# Patient Record
Sex: FEMALE | Race: WHITE | NOT HISPANIC OR LATINO | URBAN - METROPOLITAN AREA
[De-identification: names, ages, dates, MRNs, and addresses within clinical notes are randomized per-mention and may not be internally consistent; named-entity substitution may affect disease eponyms.]

---

## 2017-01-09 ENCOUNTER — EMERGENCY (EMERGENCY)
Facility: HOSPITAL | Age: 67
LOS: 1 days | Discharge: HOME | End: 2017-01-09
Attending: EMERGENCY MEDICINE | Admitting: EMERGENCY MEDICINE
Payer: MEDICARE

## 2017-01-09 VITALS
SYSTOLIC BLOOD PRESSURE: 171 MMHG | OXYGEN SATURATION: 96 % | DIASTOLIC BLOOD PRESSURE: 85 MMHG | HEART RATE: 94 BPM | RESPIRATION RATE: 18 BRPM

## 2017-01-09 VITALS
RESPIRATION RATE: 18 BRPM | DIASTOLIC BLOOD PRESSURE: 88 MMHG | HEART RATE: 96 BPM | OXYGEN SATURATION: 96 % | TEMPERATURE: 99 F | SYSTOLIC BLOOD PRESSURE: 166 MMHG

## 2017-01-09 DIAGNOSIS — R10.30 LOWER ABDOMINAL PAIN, UNSPECIFIED: ICD-10-CM

## 2017-01-09 DIAGNOSIS — R35.0 FREQUENCY OF MICTURITION: ICD-10-CM

## 2017-01-09 DIAGNOSIS — M79.89 OTHER SPECIFIED SOFT TISSUE DISORDERS: ICD-10-CM

## 2017-01-09 DIAGNOSIS — R06.2 WHEEZING: ICD-10-CM

## 2017-01-09 DIAGNOSIS — F17.200 NICOTINE DEPENDENCE, UNSPECIFIED, UNCOMPLICATED: ICD-10-CM

## 2017-01-09 DIAGNOSIS — R05 COUGH: ICD-10-CM

## 2017-01-09 LAB
ALBUMIN SERPL ELPH-MCNC: 4 G/DL — SIGNIFICANT CHANGE UP (ref 3.4–5)
ALP SERPL-CCNC: 144 U/L — HIGH (ref 40–120)
ALT FLD-CCNC: 41 U/L — SIGNIFICANT CHANGE UP (ref 12–42)
ANION GAP SERPL CALC-SCNC: 13 MMOL/L — SIGNIFICANT CHANGE UP (ref 9–16)
APPEARANCE UR: CLEAR — SIGNIFICANT CHANGE UP
AST SERPL-CCNC: 26 U/L — SIGNIFICANT CHANGE UP (ref 15–37)
BASOPHILS NFR BLD AUTO: 0.3 % — SIGNIFICANT CHANGE UP (ref 0–2)
BILIRUB SERPL-MCNC: 0.3 MG/DL — SIGNIFICANT CHANGE UP (ref 0.2–1.2)
BILIRUB UR-MCNC: NEGATIVE — SIGNIFICANT CHANGE UP
BUN SERPL-MCNC: 17 MG/DL — SIGNIFICANT CHANGE UP (ref 7–23)
CALCIUM SERPL-MCNC: 9.8 MG/DL — SIGNIFICANT CHANGE UP (ref 8.5–10.5)
CHLORIDE SERPL-SCNC: 101 MMOL/L — SIGNIFICANT CHANGE UP (ref 96–108)
CK MB BLD-MCNC: 1.13 % — SIGNIFICANT CHANGE UP
CK MB CFR SERPL CALC: 1.6 NG/ML — SIGNIFICANT CHANGE UP (ref 0.5–3.6)
CK SERPL-CCNC: 142 U/L — SIGNIFICANT CHANGE UP (ref 26–192)
CO2 SERPL-SCNC: 24 MMOL/L — SIGNIFICANT CHANGE UP (ref 22–31)
COLOR SPEC: YELLOW — SIGNIFICANT CHANGE UP
CREAT SERPL-MCNC: 0.83 MG/DL — SIGNIFICANT CHANGE UP (ref 0.5–1.3)
DIFF PNL FLD: NEGATIVE — SIGNIFICANT CHANGE UP
EOSINOPHIL NFR BLD AUTO: 1.1 % — SIGNIFICANT CHANGE UP (ref 0–6)
FLUAV SPEC QL CULT: NEGATIVE — SIGNIFICANT CHANGE UP
FLUBV AG SPEC QL IA: NEGATIVE — SIGNIFICANT CHANGE UP
GLUCOSE SERPL-MCNC: 104 MG/DL — HIGH (ref 70–99)
GLUCOSE UR QL: NEGATIVE — SIGNIFICANT CHANGE UP
HCT VFR BLD CALC: 37.6 % — SIGNIFICANT CHANGE UP (ref 34.5–45)
HGB BLD-MCNC: 12.6 G/DL — SIGNIFICANT CHANGE UP (ref 11.5–15.5)
IMM GRANULOCYTES NFR BLD AUTO: 0.4 % — SIGNIFICANT CHANGE UP (ref 0–1.5)
KETONES UR-MCNC: NEGATIVE — SIGNIFICANT CHANGE UP
LACTATE SERPL-SCNC: 1.5 MMOL/L — SIGNIFICANT CHANGE UP (ref 0.4–2)
LACTATE SERPL-SCNC: 2.1 MMOL/L — HIGH (ref 0.4–2)
LEUKOCYTE ESTERASE UR-ACNC: NEGATIVE — SIGNIFICANT CHANGE UP
LIDOCAIN IGE QN: 105 U/L — SIGNIFICANT CHANGE UP (ref 73–393)
LYMPHOCYTES # BLD AUTO: 18.6 % — SIGNIFICANT CHANGE UP (ref 13–44)
MCHC RBC-ENTMCNC: 28.4 PG — SIGNIFICANT CHANGE UP (ref 27–34)
MCHC RBC-ENTMCNC: 33.5 G/DL — SIGNIFICANT CHANGE UP (ref 32–36)
MCV RBC AUTO: 84.7 FL — SIGNIFICANT CHANGE UP (ref 80–100)
MONOCYTES NFR BLD AUTO: 7 % — SIGNIFICANT CHANGE UP (ref 2–14)
NEUTROPHILS NFR BLD AUTO: 72.6 % — SIGNIFICANT CHANGE UP (ref 43–77)
NITRITE UR-MCNC: NEGATIVE — SIGNIFICANT CHANGE UP
NT-PROBNP SERPL-SCNC: 742 PG/ML — HIGH
PH UR: 6.5 — SIGNIFICANT CHANGE UP (ref 4–8.1)
PLATELET # BLD AUTO: 329 K/UL — SIGNIFICANT CHANGE UP (ref 150–400)
POTASSIUM SERPL-MCNC: 4 MMOL/L — SIGNIFICANT CHANGE UP (ref 3.5–5.3)
POTASSIUM SERPL-SCNC: 4 MMOL/L — SIGNIFICANT CHANGE UP (ref 3.5–5.3)
PROT SERPL-MCNC: 7.8 G/DL — SIGNIFICANT CHANGE UP (ref 6.4–8.2)
PROT UR-MCNC: NEGATIVE MG/DL — SIGNIFICANT CHANGE UP
RBC # BLD: 4.44 M/UL — SIGNIFICANT CHANGE UP (ref 3.8–5.2)
RBC # FLD: 15 % — SIGNIFICANT CHANGE UP (ref 10.3–16.9)
SODIUM SERPL-SCNC: 138 MMOL/L — SIGNIFICANT CHANGE UP (ref 132–145)
SP GR SPEC: 1.01 — SIGNIFICANT CHANGE UP (ref 1–1.03)
TROPONIN I SERPL-MCNC: <0.017 NG/ML — LOW (ref 0.02–0.06)
UROBILINOGEN FLD QL: 0.2 E.U./DL — SIGNIFICANT CHANGE UP
WBC # BLD: 12.2 K/UL — HIGH (ref 3.8–10.5)
WBC # FLD AUTO: 12.2 K/UL — HIGH (ref 3.8–10.5)

## 2017-01-09 PROCEDURE — 71010: CPT | Mod: 26

## 2017-01-09 PROCEDURE — 99285 EMERGENCY DEPT VISIT HI MDM: CPT | Mod: 25

## 2017-01-09 PROCEDURE — 93010 ELECTROCARDIOGRAM REPORT: CPT

## 2017-01-09 PROCEDURE — 74177 CT ABD & PELVIS W/CONTRAST: CPT | Mod: 26

## 2017-01-09 RX ORDER — IPRATROPIUM/ALBUTEROL SULFATE 18-103MCG
3 AEROSOL WITH ADAPTER (GRAM) INHALATION ONCE
Qty: 0 | Refills: 0 | Status: COMPLETED | OUTPATIENT
Start: 2017-01-09 | End: 2017-01-09

## 2017-01-09 RX ORDER — ALBUTEROL 90 UG/1
1 AEROSOL, METERED ORAL ONCE
Qty: 0 | Refills: 0 | Status: COMPLETED | OUTPATIENT
Start: 2017-01-09 | End: 2017-01-09

## 2017-01-09 RX ORDER — AZITHROMYCIN 500 MG/1
1 TABLET, FILM COATED ORAL
Qty: 3 | Refills: 0
Start: 2017-01-09 | End: 2017-01-12

## 2017-01-09 RX ORDER — SODIUM CHLORIDE 9 MG/ML
1000 INJECTION INTRAMUSCULAR; INTRAVENOUS; SUBCUTANEOUS ONCE
Qty: 0 | Refills: 0 | Status: COMPLETED | OUTPATIENT
Start: 2017-01-09 | End: 2017-01-09

## 2017-01-09 RX ORDER — MORPHINE SULFATE 50 MG/1
4 CAPSULE, EXTENDED RELEASE ORAL ONCE
Qty: 0 | Refills: 0 | Status: DISCONTINUED | OUTPATIENT
Start: 2017-01-09 | End: 2017-01-09

## 2017-01-09 RX ORDER — AZITHROMYCIN 500 MG/1
500 TABLET, FILM COATED ORAL ONCE
Qty: 0 | Refills: 0 | Status: COMPLETED | OUTPATIENT
Start: 2017-01-09 | End: 2017-01-09

## 2017-01-09 RX ORDER — IOHEXOL 300 MG/ML
50 INJECTION, SOLUTION INTRAVENOUS ONCE
Qty: 0 | Refills: 0 | Status: COMPLETED | OUTPATIENT
Start: 2017-01-09 | End: 2017-01-09

## 2017-01-09 RX ADMIN — MORPHINE SULFATE 4 MILLIGRAM(S): 50 CAPSULE, EXTENDED RELEASE ORAL at 20:10

## 2017-01-09 RX ADMIN — Medication 125 MILLIGRAM(S): at 19:11

## 2017-01-09 RX ADMIN — Medication 3 MILLILITER(S): at 19:11

## 2017-01-09 RX ADMIN — IOHEXOL 50 MILLILITER(S): 300 INJECTION, SOLUTION INTRAVENOUS at 19:11

## 2017-01-09 RX ADMIN — MORPHINE SULFATE 4 MILLIGRAM(S): 50 CAPSULE, EXTENDED RELEASE ORAL at 19:11

## 2017-01-09 RX ADMIN — SODIUM CHLORIDE 2000 MILLILITER(S): 9 INJECTION INTRAMUSCULAR; INTRAVENOUS; SUBCUTANEOUS at 20:15

## 2017-01-09 RX ADMIN — AZITHROMYCIN 500 MILLIGRAM(S): 500 TABLET, FILM COATED ORAL at 21:42

## 2017-01-09 RX ADMIN — SODIUM CHLORIDE 2000 MILLILITER(S): 9 INJECTION INTRAMUSCULAR; INTRAVENOUS; SUBCUTANEOUS at 19:09

## 2017-01-09 NOTE — ED PROVIDER NOTE - PROGRESS NOTE DETAILS
improved lactic after fluids, azithro to cover for bronchitis possibility, lung nodule on CT - follow up w pmd. inhaler as needed

## 2017-01-09 NOTE — ED PROVIDER NOTE - INTERPRETATION
normal sinus rhythm, Normal axis, Normal ID interval and QRS complex. There are no acute ischemic ST or T-wave changes.

## 2017-01-09 NOTE — ED PROVIDER NOTE - OBJECTIVE STATEMENT
67 y/o F with pmhx fibromyalgia, Hepatitis C, lives in 6-story walkup, c/o R groin pain that has worsened for the last month, worse with movement of RLE. Also has h/o piriformis muscle strain on the R side. Has had groin pain with associated dysuria, increased urinary frequency and foul smelling urine, worsened this week without any f/c. No n/v. Also has had cough with white phlegm for the past week. States in general she has deteriorated in the last year and has gained 50 pounds. She has  that coordinates that she has help at home once/week. Pt states her pain has worsened to the point that she has trouble bearing weight and has trouble doing daily activities. Denies any trauma or fall. Pt is long term smoker.

## 2017-01-09 NOTE — ED ADULT NURSE NOTE - OBJECTIVE STATEMENT
c/o right groin pain and stress incontinence x 1 month, no s/s of distress, will continue to monitor

## 2017-01-09 NOTE — ED PROVIDER NOTE - NS ED MD SCRIBE ATTENDING SCRIBE SECTIONS
VITAL SIGNS( Pullset)/HISTORY OF PRESENT ILLNESS/PAST MEDICAL/SURGICAL/SOCIAL HISTORY/HIV/REVIEW OF SYSTEMS/PHYSICAL EXAM

## 2017-01-10 RX ADMIN — ALBUTEROL 1 PUFF(S): 90 AEROSOL, METERED ORAL at 01:02

## 2017-01-11 ENCOUNTER — EMERGENCY (EMERGENCY)
Facility: HOSPITAL | Age: 67
LOS: 1 days | Discharge: HOME | End: 2017-01-11
Attending: EMERGENCY MEDICINE | Admitting: EMERGENCY MEDICINE
Payer: MEDICARE

## 2017-01-11 VITALS
TEMPERATURE: 100 F | RESPIRATION RATE: 20 BRPM | HEART RATE: 96 BPM | SYSTOLIC BLOOD PRESSURE: 156 MMHG | OXYGEN SATURATION: 95 % | DIASTOLIC BLOOD PRESSURE: 71 MMHG

## 2017-01-11 VITALS
RESPIRATION RATE: 20 BRPM | DIASTOLIC BLOOD PRESSURE: 92 MMHG | OXYGEN SATURATION: 96 % | TEMPERATURE: 98 F | SYSTOLIC BLOOD PRESSURE: 154 MMHG | HEART RATE: 107 BPM

## 2017-01-11 DIAGNOSIS — Z79.899 OTHER LONG TERM (CURRENT) DRUG THERAPY: ICD-10-CM

## 2017-01-11 DIAGNOSIS — Z79.891 LONG TERM (CURRENT) USE OF OPIATE ANALGESIC: ICD-10-CM

## 2017-01-11 DIAGNOSIS — M79.7 FIBROMYALGIA: ICD-10-CM

## 2017-01-11 DIAGNOSIS — Z79.2 LONG TERM (CURRENT) USE OF ANTIBIOTICS: ICD-10-CM

## 2017-01-11 DIAGNOSIS — R10.30 LOWER ABDOMINAL PAIN, UNSPECIFIED: ICD-10-CM

## 2017-01-11 LAB
-  AMIKACIN: SIGNIFICANT CHANGE UP
-  AMPICILLIN/SULBACTAM: SIGNIFICANT CHANGE UP
-  AMPICILLIN: SIGNIFICANT CHANGE UP
-  AZTREONAM: SIGNIFICANT CHANGE UP
-  CEFAZOLIN: SIGNIFICANT CHANGE UP
-  CEFEPIME: SIGNIFICANT CHANGE UP
-  CEFOXITIN: SIGNIFICANT CHANGE UP
-  CEFTAZIDIME: SIGNIFICANT CHANGE UP
-  CEFTRIAXONE: SIGNIFICANT CHANGE UP
-  CIPROFLOXACIN: SIGNIFICANT CHANGE UP
-  ERTAPENEM: SIGNIFICANT CHANGE UP
-  GENTAMICIN: SIGNIFICANT CHANGE UP
-  LEVOFLOXACIN: SIGNIFICANT CHANGE UP
-  MEROPENEM: SIGNIFICANT CHANGE UP
-  NITROFURANTOIN: SIGNIFICANT CHANGE UP
-  PIPERACILLIN/TAZOBACTAM: SIGNIFICANT CHANGE UP
-  TOBRAMYCIN: SIGNIFICANT CHANGE UP
-  TRIMETHOPRIM/SULFAMETHOXAZOLE: SIGNIFICANT CHANGE UP
CULTURE RESULTS: SIGNIFICANT CHANGE UP
METHOD TYPE: SIGNIFICANT CHANGE UP
ORGANISM # SPEC MICROSCOPIC CNT: SIGNIFICANT CHANGE UP
ORGANISM # SPEC MICROSCOPIC CNT: SIGNIFICANT CHANGE UP
SPECIMEN SOURCE: SIGNIFICANT CHANGE UP

## 2017-01-11 PROCEDURE — 99284 EMERGENCY DEPT VISIT MOD MDM: CPT

## 2017-01-11 RX ORDER — DIAZEPAM 5 MG
1 TABLET ORAL
Qty: 12 | Refills: 0
Start: 2017-01-11

## 2017-01-11 RX ORDER — KETOROLAC TROMETHAMINE 30 MG/ML
60 SYRINGE (ML) INJECTION ONCE
Qty: 0 | Refills: 0 | Status: DISCONTINUED | OUTPATIENT
Start: 2017-01-11 | End: 2017-01-11

## 2017-01-11 RX ORDER — MOXIFLOXACIN HYDROCHLORIDE TABLETS, 400 MG 400 MG/1
1 TABLET, FILM COATED ORAL
Qty: 6 | Refills: 0
Start: 2017-01-11 | End: 2017-01-14

## 2017-01-11 RX ADMIN — Medication 60 MILLIGRAM(S): at 17:54

## 2017-01-11 NOTE — ED PROVIDER NOTE - NS ED MD SCRIBE ATTENDING SCRIBE SECTIONS
HIV/RESULTS/PROGRESS NOTE/REVIEW OF SYSTEMS/PAST MEDICAL/SURGICAL/SOCIAL HISTORY/DISPOSITION/PHYSICAL EXAM/HISTORY OF PRESENT ILLNESS/VITAL SIGNS( Pullset)

## 2017-01-11 NOTE — ED PROVIDER NOTE - OBJECTIVE STATEMENT
67 y/o F with pmhx fibromyalgia, Hepatitis C, lives in 6-story walkup, c/o R groin pain that has worsened for the last month, worse with movement of RLE. Also has h/o piriformis muscle strain on the R side. Was seen here yesterday for groin pain and cough, given Azithromycin for possible bronchitis per persistent right groin pain, states 10/10. Today patient presents complaining of persistent right groin pain.  Worse with movement, consistent with history of fibromyalgia and strained groin muscle. Pt did not take any pain medications prior to arrival. NO fever, no chills, and no trauma. Wants something to help with muscle relaxants and pain.

## 2017-01-11 NOTE — ED PROVIDER NOTE - PROGRESS NOTE DETAILS
pt feels improved, will rx valium, cipro for UTI, give strict return precautions, follow up with PMD

## 2017-01-11 NOTE — ED PROVIDER NOTE - PSYCHIATRIC, MLM
Alert and oriented to person, place, time/situation. normal mood and affect. no apparent risk to self or others. anxious appearing

## 2017-01-14 LAB
CULTURE RESULTS: SIGNIFICANT CHANGE UP
SPECIMEN SOURCE: SIGNIFICANT CHANGE UP

## 2017-02-01 ENCOUNTER — EMERGENCY (EMERGENCY)
Facility: HOSPITAL | Age: 67
LOS: 1 days | Discharge: HOME | End: 2017-02-01
Attending: EMERGENCY MEDICINE | Admitting: EMERGENCY MEDICINE
Payer: MEDICARE

## 2017-02-01 VITALS
DIASTOLIC BLOOD PRESSURE: 74 MMHG | RESPIRATION RATE: 16 BRPM | TEMPERATURE: 99 F | SYSTOLIC BLOOD PRESSURE: 134 MMHG | HEART RATE: 92 BPM | OXYGEN SATURATION: 95 %

## 2017-02-01 VITALS
HEART RATE: 102 BPM | TEMPERATURE: 98 F | DIASTOLIC BLOOD PRESSURE: 71 MMHG | SYSTOLIC BLOOD PRESSURE: 114 MMHG | OXYGEN SATURATION: 95 % | RESPIRATION RATE: 17 BRPM

## 2017-02-01 DIAGNOSIS — R10.30 LOWER ABDOMINAL PAIN, UNSPECIFIED: ICD-10-CM

## 2017-02-01 DIAGNOSIS — D64.9 ANEMIA, UNSPECIFIED: ICD-10-CM

## 2017-02-01 DIAGNOSIS — Z79.899 OTHER LONG TERM (CURRENT) DRUG THERAPY: ICD-10-CM

## 2017-02-01 DIAGNOSIS — Z79.52 LONG TERM (CURRENT) USE OF SYSTEMIC STEROIDS: ICD-10-CM

## 2017-02-01 DIAGNOSIS — Z79.891 LONG TERM (CURRENT) USE OF OPIATE ANALGESIC: ICD-10-CM

## 2017-02-01 DIAGNOSIS — Z79.2 LONG TERM (CURRENT) USE OF ANTIBIOTICS: ICD-10-CM

## 2017-02-01 PROBLEM — M50.20 OTHER CERVICAL DISC DISPLACEMENT, UNSPECIFIED CERVICAL REGION: Chronic | Status: ACTIVE | Noted: 2017-01-09

## 2017-02-01 PROBLEM — B19.20 UNSPECIFIED VIRAL HEPATITIS C WITHOUT HEPATIC COMA: Chronic | Status: ACTIVE | Noted: 2017-01-09

## 2017-02-01 PROBLEM — M79.7 FIBROMYALGIA: Chronic | Status: ACTIVE | Noted: 2017-01-09

## 2017-02-01 LAB
ALBUMIN SERPL ELPH-MCNC: 3.5 G/DL — SIGNIFICANT CHANGE UP (ref 3.4–5)
ALP SERPL-CCNC: 166 U/L — HIGH (ref 40–120)
ALT FLD-CCNC: 31 U/L — SIGNIFICANT CHANGE UP (ref 12–42)
ANION GAP SERPL CALC-SCNC: 8 MMOL/L — LOW (ref 9–16)
AST SERPL-CCNC: 27 U/L — SIGNIFICANT CHANGE UP (ref 15–37)
BASOPHILS NFR BLD AUTO: 0.5 % — SIGNIFICANT CHANGE UP (ref 0–2)
BASOPHILS NFR BLD AUTO: 0.6 % — SIGNIFICANT CHANGE UP (ref 0–2)
BILIRUB SERPL-MCNC: 0.3 MG/DL — SIGNIFICANT CHANGE UP (ref 0.2–1.2)
BUN SERPL-MCNC: 17 MG/DL — SIGNIFICANT CHANGE UP (ref 7–23)
CALCIUM SERPL-MCNC: 8.8 MG/DL — SIGNIFICANT CHANGE UP (ref 8.5–10.5)
CHLORIDE SERPL-SCNC: 101 MMOL/L — SIGNIFICANT CHANGE UP (ref 96–108)
CO2 SERPL-SCNC: 30 MMOL/L — SIGNIFICANT CHANGE UP (ref 22–31)
CREAT SERPL-MCNC: 1.08 MG/DL — SIGNIFICANT CHANGE UP (ref 0.5–1.3)
EOSINOPHIL NFR BLD AUTO: 2.3 % — SIGNIFICANT CHANGE UP (ref 0–6)
EOSINOPHIL NFR BLD AUTO: 3.1 % — SIGNIFICANT CHANGE UP (ref 0–6)
GLUCOSE SERPL-MCNC: 115 MG/DL — HIGH (ref 70–99)
HCT VFR BLD CALC: 22.1 % — LOW (ref 34.5–45)
HCT VFR BLD CALC: 23.4 % — LOW (ref 34.5–45)
HGB BLD-MCNC: 6.9 G/DL — CRITICAL LOW (ref 11.5–15.5)
HGB BLD-MCNC: 7.3 G/DL — LOW (ref 11.5–15.5)
IMM GRANULOCYTES NFR BLD AUTO: 0.3 % — SIGNIFICANT CHANGE UP (ref 0–1.5)
IMM GRANULOCYTES NFR BLD AUTO: 0.5 % — SIGNIFICANT CHANGE UP (ref 0–1.5)
LYMPHOCYTES # BLD AUTO: 20.7 % — SIGNIFICANT CHANGE UP (ref 13–44)
LYMPHOCYTES # BLD AUTO: 28.9 % — SIGNIFICANT CHANGE UP (ref 13–44)
MCHC RBC-ENTMCNC: 26.1 PG — LOW (ref 27–34)
MCHC RBC-ENTMCNC: 26.2 PG — LOW (ref 27–34)
MCHC RBC-ENTMCNC: 31.2 G/DL — LOW (ref 32–36)
MCHC RBC-ENTMCNC: 31.2 G/DL — LOW (ref 32–36)
MCV RBC AUTO: 83.7 FL — SIGNIFICANT CHANGE UP (ref 80–100)
MCV RBC AUTO: 83.9 FL — SIGNIFICANT CHANGE UP (ref 80–100)
MONOCYTES NFR BLD AUTO: 10.1 % — SIGNIFICANT CHANGE UP (ref 2–14)
MONOCYTES NFR BLD AUTO: 11 % — SIGNIFICANT CHANGE UP (ref 2–14)
NEUTROPHILS NFR BLD AUTO: 56.1 % — SIGNIFICANT CHANGE UP (ref 43–77)
NEUTROPHILS NFR BLD AUTO: 65.9 % — SIGNIFICANT CHANGE UP (ref 43–77)
OB PNL STL: NEGATIVE — SIGNIFICANT CHANGE UP
PLATELET # BLD AUTO: 337 K/UL — SIGNIFICANT CHANGE UP (ref 150–400)
PLATELET # BLD AUTO: 365 K/UL — SIGNIFICANT CHANGE UP (ref 150–400)
POTASSIUM SERPL-MCNC: 3.7 MMOL/L — SIGNIFICANT CHANGE UP (ref 3.5–5.3)
POTASSIUM SERPL-SCNC: 3.7 MMOL/L — SIGNIFICANT CHANGE UP (ref 3.5–5.3)
PROT SERPL-MCNC: 7 G/DL — SIGNIFICANT CHANGE UP (ref 6.4–8.2)
RBC # BLD: 2.64 M/UL — LOW (ref 3.8–5.2)
RBC # BLD: 2.79 M/UL — LOW (ref 3.8–5.2)
RBC # FLD: 16.9 % — SIGNIFICANT CHANGE UP (ref 10.3–16.9)
RBC # FLD: 17.2 % — HIGH (ref 10.3–16.9)
SODIUM SERPL-SCNC: 139 MMOL/L — SIGNIFICANT CHANGE UP (ref 132–145)
WBC # BLD: 7.1 K/UL — SIGNIFICANT CHANGE UP (ref 3.8–10.5)
WBC # BLD: 7.4 K/UL — SIGNIFICANT CHANGE UP (ref 3.8–10.5)
WBC # FLD AUTO: 7.1 K/UL — SIGNIFICANT CHANGE UP (ref 3.8–10.5)
WBC # FLD AUTO: 7.4 K/UL — SIGNIFICANT CHANGE UP (ref 3.8–10.5)

## 2017-02-01 PROCEDURE — 73502 X-RAY EXAM HIP UNI 2-3 VIEWS: CPT | Mod: 26

## 2017-02-01 PROCEDURE — 99285 EMERGENCY DEPT VISIT HI MDM: CPT

## 2017-02-01 PROCEDURE — 93970 EXTREMITY STUDY: CPT | Mod: 26

## 2017-02-01 RX ORDER — MORPHINE SULFATE 50 MG/1
4 CAPSULE, EXTENDED RELEASE ORAL ONCE
Qty: 0 | Refills: 0 | Status: DISCONTINUED | OUTPATIENT
Start: 2017-02-01 | End: 2017-02-01

## 2017-02-01 RX ORDER — DIAZEPAM 5 MG
1 TABLET ORAL
Qty: 9 | Refills: 0
Start: 2017-02-01 | End: 2017-02-04

## 2017-02-01 RX ADMIN — MORPHINE SULFATE 4 MILLIGRAM(S): 50 CAPSULE, EXTENDED RELEASE ORAL at 11:10

## 2017-02-01 RX ADMIN — MORPHINE SULFATE 4 MILLIGRAM(S): 50 CAPSULE, EXTENDED RELEASE ORAL at 21:15

## 2017-02-01 NOTE — ED PROVIDER NOTE - OBJECTIVE STATEMENT
66y F Pt with PMHx of fibromyalgia, herniated disc, hep c presents to ED with right groin pain x2 weeks. Pt was previously seen in the ER for symptoms and had a CT A/P to check for hernia, CT showed no hernia. Pt also went to see PMD to have blood work repeated and was told her hemoglobin dropped significantly from her previous ER visit. Pt returns to ER today for repeat blood work and pain control of right groin. Pt is using Vicodin at home with minimal relief of symptoms. Associated difficulty walking and ambulating upstairs. 66y F Pt with PMHx of fibromyalgia, herniated disc, hep c CAD with LAD stent, hx depression, presents to ED with right groin pain x2 weeks. Pt was previously seen in the ER for symptoms and had a CT A/P to check for hernia, CT showed no hernia. Pt also went to see PMD to have blood work repeated and was told her hemoglobin dropped significantly from her previous ER visit. Pt returns to ER today for repeat blood work and pain control of right groin. Pt is using Vicodin at home with minimal relief of symptoms. Associated difficulty walking and ambulating upstairs.

## 2017-02-01 NOTE — ED PROVIDER NOTE - PMH
Fibromyalgia    Hepatitis C    Herniated disc, cervical Colon polyp    Coronary artery disease    Fibromyalgia    Hepatitis C    Herniated disc, cervical

## 2017-02-01 NOTE — ED PROVIDER NOTE - PROGRESS NOTE DETAILS
spoke with PMD DR MORELOS, noted sudden onset of anemia, Hgb 8. spoke with PMD DR MORELOS, noted sudden onset of anemia, Hgb 8. had colonoscopy 2015 with polyp in descending colon, pt has hx CAD mild and follows with a cardiologist outpatient anemia noted, no indication for transfusion at this time, FOBT negative, could be due to medication patient is taking for hep C, results given and she will Fu with her:PMD I offered observation admission but she refused, stating she feels fine and will f/u with her PMD. US negative for DVT,XR R hip negative for fx, hip groin pain most likely due to MSK origin. pt was to get rx for valium per dr mak for msk groin pain which she worked up, will send rx and give PO  5mg in ED

## 2017-02-01 NOTE — ED ADULT NURSE REASSESSMENT NOTE - NS ED NURSE REASSESS COMMENT FT1
Patient is now discharged as per md with instructions to follow up with PMD. Patient agrees with plan, denies any weakness, nausea, dizziness or other distress. Currently awaiting for ambulance transportation services.
[facility]

## 2017-02-01 NOTE — ED PROVIDER NOTE - MUSCULOSKELETAL, MLM
Bilateral +1 peripheral edema. Spine appears normal, range of motion is not limited, no muscle or joint tenderness

## 2017-02-01 NOTE — ED PROVIDER NOTE - SKIN, MLM
Skin normal color for race, warm, dry and intact. No evidence of rash. No cellulitis, no open wound, no drainage.

## 2017-02-01 NOTE — ED PROVIDER NOTE - NS ED MD SCRIBE ATTENDING SCRIBE SECTIONS
REVIEW OF SYSTEMS/PHYSICAL EXAM/INTAKE ASSESSMENT/SCREENINGS/PAST MEDICAL/SURGICAL/SOCIAL HISTORY/HIV/HISTORY OF PRESENT ILLNESS/VITAL SIGNS( Pullset)

## 2018-02-16 ENCOUNTER — APPOINTMENT (OUTPATIENT)
Dept: ORTHOPEDIC SURGERY | Facility: CLINIC | Age: 68
End: 2018-02-16
Payer: MEDICARE

## 2018-02-16 DIAGNOSIS — Z87.01 PERSONAL HISTORY OF PNEUMONIA (RECURRENT): ICD-10-CM

## 2018-02-16 DIAGNOSIS — Z82.62 FAMILY HISTORY OF OSTEOPOROSIS: ICD-10-CM

## 2018-02-16 DIAGNOSIS — Z87.09 PERSONAL HISTORY OF OTHER DISEASES OF THE RESPIRATORY SYSTEM: ICD-10-CM

## 2018-02-16 DIAGNOSIS — Z86.39 PERSONAL HISTORY OF OTHER ENDOCRINE, NUTRITIONAL AND METABOLIC DISEASE: ICD-10-CM

## 2018-02-16 DIAGNOSIS — Z82.49 FAMILY HISTORY OF ISCHEMIC HEART DISEASE AND OTHER DISEASES OF THE CIRCULATORY SYSTEM: ICD-10-CM

## 2018-02-16 DIAGNOSIS — Z87.891 PERSONAL HISTORY OF NICOTINE DEPENDENCE: ICD-10-CM

## 2018-02-16 DIAGNOSIS — Z87.39 PERSONAL HISTORY OF OTHER DISEASES OF THE MUSCULOSKELETAL SYSTEM AND CONNECTIVE TISSUE: ICD-10-CM

## 2018-02-16 DIAGNOSIS — I25.2 OLD MYOCARDIAL INFARCTION: ICD-10-CM

## 2018-02-16 PROBLEM — Z00.00 ENCOUNTER FOR PREVENTIVE HEALTH EXAMINATION: Status: ACTIVE | Noted: 2018-02-16

## 2018-02-16 PROCEDURE — 26750 TREAT FINGER FRACTURE EACH: CPT | Mod: F5

## 2018-02-16 PROCEDURE — 99204 OFFICE O/P NEW MOD 45 MIN: CPT | Mod: 57

## 2018-02-16 RX ORDER — METOPROLOL SUCCINATE 50 MG/1
50 TABLET, EXTENDED RELEASE ORAL
Qty: 30 | Refills: 0 | Status: ACTIVE | COMMUNITY
Start: 2018-01-08

## 2018-02-16 RX ORDER — LOSARTAN POTASSIUM 50 MG/1
50 TABLET, FILM COATED ORAL
Qty: 30 | Refills: 0 | Status: ACTIVE | COMMUNITY
Start: 2018-01-08

## 2018-02-16 RX ORDER — VENLAFAXINE HYDROCHLORIDE 150 MG/1
150 CAPSULE, EXTENDED RELEASE ORAL
Refills: 0 | Status: ACTIVE | COMMUNITY

## 2018-02-16 RX ORDER — ATORVASTATIN CALCIUM 40 MG/1
40 TABLET, FILM COATED ORAL
Qty: 30 | Refills: 0 | Status: ACTIVE | COMMUNITY
Start: 2018-01-08

## 2018-04-09 ENCOUNTER — OUTPATIENT (OUTPATIENT)
Dept: OUTPATIENT SERVICES | Facility: HOSPITAL | Age: 68
LOS: 1 days | End: 2018-04-09

## 2018-04-09 ENCOUNTER — APPOINTMENT (OUTPATIENT)
Dept: ORTHOPEDIC SURGERY | Facility: CLINIC | Age: 68
End: 2018-04-09
Payer: MEDICARE

## 2018-04-10 ENCOUNTER — FORM ENCOUNTER (OUTPATIENT)
Age: 68
End: 2018-04-10

## 2018-04-11 ENCOUNTER — APPOINTMENT (OUTPATIENT)
Dept: RADIOLOGY | Facility: CLINIC | Age: 68
End: 2018-04-11

## 2018-04-11 ENCOUNTER — OUTPATIENT (OUTPATIENT)
Dept: OUTPATIENT SERVICES | Facility: HOSPITAL | Age: 68
LOS: 1 days | End: 2018-04-11
Payer: MEDICARE

## 2018-04-11 ENCOUNTER — APPOINTMENT (OUTPATIENT)
Dept: ORTHOPEDIC SURGERY | Facility: CLINIC | Age: 68
End: 2018-04-11
Payer: MEDICARE

## 2018-04-11 VITALS — WEIGHT: 180 LBS | BODY MASS INDEX: 33.13 KG/M2 | HEIGHT: 62 IN

## 2018-04-11 PROCEDURE — 99024 POSTOP FOLLOW-UP VISIT: CPT

## 2018-04-11 PROCEDURE — 73140 X-RAY EXAM OF FINGER(S): CPT

## 2018-04-11 PROCEDURE — 73140 X-RAY EXAM OF FINGER(S): CPT | Mod: 26,RT

## 2018-06-12 ENCOUNTER — APPOINTMENT (OUTPATIENT)
Dept: ULTRASOUND IMAGING | Facility: HOSPITAL | Age: 68
End: 2018-06-12

## 2018-06-12 ENCOUNTER — APPOINTMENT (OUTPATIENT)
Dept: MAMMOGRAPHY | Facility: HOSPITAL | Age: 68
End: 2018-06-12

## 2018-10-30 ENCOUNTER — EMERGENCY (EMERGENCY)
Facility: HOSPITAL | Age: 68
LOS: 1 days | Discharge: HOME | End: 2018-10-30
Attending: EMERGENCY MEDICINE | Admitting: EMERGENCY MEDICINE
Payer: MEDICARE

## 2018-10-30 VITALS
RESPIRATION RATE: 15 BRPM | HEART RATE: 91 BPM | TEMPERATURE: 98 F | DIASTOLIC BLOOD PRESSURE: 63 MMHG | SYSTOLIC BLOOD PRESSURE: 99 MMHG | OXYGEN SATURATION: 96 %

## 2018-10-30 PROCEDURE — 23600 CLTX PROX HUMRL FX W/O MNPJ: CPT | Mod: 54

## 2018-10-30 PROCEDURE — 99284 EMERGENCY DEPT VISIT MOD MDM: CPT | Mod: 25

## 2018-10-30 NOTE — ED ADULT TRIAGE NOTE - CHIEF COMPLAINT QUOTE
pt walk in with right arm/shoulder pain; fell into pot hole and stuck arm out to stop fall; unable to move shoulder up and down

## 2018-10-31 PROBLEM — I25.10 ATHEROSCLEROTIC HEART DISEASE OF NATIVE CORONARY ARTERY WITHOUT ANGINA PECTORIS: Chronic | Status: ACTIVE | Noted: 2017-02-01

## 2018-10-31 PROBLEM — K63.5 POLYP OF COLON: Chronic | Status: ACTIVE | Noted: 2017-02-01

## 2018-10-31 PROCEDURE — 73030 X-RAY EXAM OF SHOULDER: CPT | Mod: 26,RT

## 2018-10-31 PROCEDURE — 73060 X-RAY EXAM OF HUMERUS: CPT | Mod: 26,RT

## 2018-10-31 RX ORDER — OXYCODONE AND ACETAMINOPHEN 5; 325 MG/1; MG/1
1 TABLET ORAL ONCE
Qty: 0 | Refills: 0 | Status: DISCONTINUED | OUTPATIENT
Start: 2018-10-31 | End: 2018-10-31

## 2018-10-31 RX ORDER — KETOROLAC TROMETHAMINE 30 MG/ML
60 SYRINGE (ML) INJECTION ONCE
Qty: 0 | Refills: 0 | Status: DISCONTINUED | OUTPATIENT
Start: 2018-10-31 | End: 2018-10-31

## 2018-10-31 RX ORDER — IBUPROFEN 200 MG
1 TABLET ORAL
Qty: 28 | Refills: 0
Start: 2018-10-31 | End: 2018-11-06

## 2018-10-31 RX ADMIN — Medication 60 MILLIGRAM(S): at 01:28

## 2018-10-31 RX ADMIN — OXYCODONE AND ACETAMINOPHEN 1 TABLET(S): 5; 325 TABLET ORAL at 01:28

## 2018-10-31 RX ADMIN — OXYCODONE AND ACETAMINOPHEN 1 TABLET(S): 5; 325 TABLET ORAL at 03:00

## 2018-10-31 NOTE — ED ADULT NURSE NOTE - NSIMPLEMENTINTERV_GEN_ALL_ED
Implemented All Universal Safety Interventions:  [place] to call system. Call bell, personal items and telephone within reach. Instruct patient to call for assistance. Room bathroom lighting operational. Non-slip footwear when patient is off stretcher. Physically safe environment: no spills, clutter or unnecessary equipment. Stretcher in lowest position, wheels locked, appropriate side rails in place.

## 2018-10-31 NOTE — ED PROVIDER NOTE - MEDICAL DECISION MAKING DETAILS
xrays, analgesics. patient has seen ortho with dr wan diane in the past, and he is currently on call. likely fractured but neurovascular intact. will dc, with sling and follow up with ortho in the am.

## 2018-10-31 NOTE — ED PROVIDER NOTE - UPPER EXTREMITY EXAM, RIGHT
TENDERNESS/SWELLING/BRUISING/JOINT SWELLING/right anterior shoulder ttp, acitve and passive rom is limited, with distal lara/radial/ulnar nn intact to motor and sensory. +2 radial pulses.

## 2018-10-31 NOTE — ED PROVIDER NOTE - OBJECTIVE STATEMENT
patient with pmhx of htn, hld, on asa, statin, and metoprolol, notes hx of mild MI, hx fibromyalgia, recent R hip replacement one year ago at [facility], presents with pain, swelling and inability to move R shoulder. notes a trip and fall on the sidewalk, causing her to fall into a pothole and broke her fall with R arm. notes worsening pain to R shoulder. denies numbness, paresthesias or focal weakness. patient is R hand dominant. notes recent fracture to R thumb and followed with orthopedics upstairs from [facility]. denies head or neck injury. patient arrived ambulatory. denies cp, sob or abd pain. denies back pain

## 2018-10-31 NOTE — ED PROVIDER NOTE - CARE PROVIDER_API CALL
Jaylen Olguin (MD), [facility]  Orthopedics  [address]  [address] [place] [address]  Phone: [phone]  Fax: [phone]

## 2018-10-31 NOTE — ED ADULT NURSE NOTE - OBJECTIVE STATEMENT
trip and fall on street and outstretched right arm; ; c/o right shoulder pain; +sensation and +pulses; unable to move shoulder up and down

## 2018-11-01 ENCOUNTER — FORM ENCOUNTER (OUTPATIENT)
Age: 68
End: 2018-11-01

## 2018-11-02 ENCOUNTER — OUTPATIENT (OUTPATIENT)
Dept: OUTPATIENT SERVICES | Facility: HOSPITAL | Age: 68
LOS: 1 days | End: 2018-11-02
Payer: MEDICARE

## 2018-11-02 ENCOUNTER — APPOINTMENT (OUTPATIENT)
Dept: CT IMAGING | Facility: CLINIC | Age: 68
End: 2018-11-02

## 2018-11-02 ENCOUNTER — APPOINTMENT (OUTPATIENT)
Dept: ORTHOPEDIC SURGERY | Facility: CLINIC | Age: 68
End: 2018-11-02
Payer: MEDICARE

## 2018-11-02 VITALS — RESPIRATION RATE: 16 BRPM | WEIGHT: 180 LBS | BODY MASS INDEX: 33.13 KG/M2 | HEIGHT: 62 IN

## 2018-11-02 PROCEDURE — 73200 CT UPPER EXTREMITY W/O DYE: CPT | Mod: 26,RT

## 2018-11-02 PROCEDURE — 23600 CLTX PROX HUMRL FX W/O MNPJ: CPT | Mod: RT

## 2018-11-02 PROCEDURE — 99214 OFFICE O/P EST MOD 30 MIN: CPT | Mod: 57

## 2018-11-02 PROCEDURE — 73200 CT UPPER EXTREMITY W/O DYE: CPT

## 2018-11-03 DIAGNOSIS — Z79.891 LONG TERM (CURRENT) USE OF OPIATE ANALGESIC: ICD-10-CM

## 2018-11-03 DIAGNOSIS — I10 ESSENTIAL (PRIMARY) HYPERTENSION: ICD-10-CM

## 2018-11-03 DIAGNOSIS — Z79.899 OTHER LONG TERM (CURRENT) DRUG THERAPY: ICD-10-CM

## 2018-11-03 DIAGNOSIS — S42.291A OTHER DISPLACED FRACTURE OF UPPER END OF RIGHT HUMERUS, INITIAL ENCOUNTER FOR CLOSED FRACTURE: ICD-10-CM

## 2018-11-03 DIAGNOSIS — F17.200 NICOTINE DEPENDENCE, UNSPECIFIED, UNCOMPLICATED: ICD-10-CM

## 2018-11-03 DIAGNOSIS — Z79.1 LONG TERM (CURRENT) USE OF NON-STEROIDAL ANTI-INFLAMMATORIES (NSAID): ICD-10-CM

## 2018-11-03 DIAGNOSIS — E78.5 HYPERLIPIDEMIA, UNSPECIFIED: ICD-10-CM
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
## 2018-11-03 DIAGNOSIS — Z79.2 LONG TERM (CURRENT) USE OF ANTIBIOTICS: ICD-10-CM

## 2018-11-03 DIAGNOSIS — M25.511 PAIN IN RIGHT SHOULDER: ICD-10-CM

## 2018-11-03 DIAGNOSIS — Z79.52 LONG TERM (CURRENT) USE OF SYSTEMIC STEROIDS: ICD-10-CM
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
## 2018-11-18 ENCOUNTER — FORM ENCOUNTER (OUTPATIENT)
Age: 68
End: 2018-11-18

## 2018-11-19 ENCOUNTER — APPOINTMENT (OUTPATIENT)
Dept: RADIOLOGY | Facility: CLINIC | Age: 68
End: 2018-11-19

## 2018-11-19 ENCOUNTER — APPOINTMENT (OUTPATIENT)
Dept: ORTHOPEDIC SURGERY | Facility: CLINIC | Age: 68
End: 2018-11-19
Payer: MEDICARE

## 2018-11-19 ENCOUNTER — OUTPATIENT (OUTPATIENT)
Dept: OUTPATIENT SERVICES | Facility: HOSPITAL | Age: 68
LOS: 1 days | End: 2018-11-19
Payer: MEDICARE

## 2018-11-19 VITALS — RESPIRATION RATE: 16 BRPM | WEIGHT: 180 LBS | BODY MASS INDEX: 33.13 KG/M2 | HEIGHT: 62 IN

## 2018-11-19 PROCEDURE — 99024 POSTOP FOLLOW-UP VISIT: CPT

## 2018-11-19 PROCEDURE — 73030 X-RAY EXAM OF SHOULDER: CPT

## 2018-11-19 PROCEDURE — 73030 X-RAY EXAM OF SHOULDER: CPT | Mod: 26,RT

## 2018-12-09 ENCOUNTER — FORM ENCOUNTER (OUTPATIENT)
Age: 68
End: 2018-12-09

## 2018-12-10 ENCOUNTER — OUTPATIENT (OUTPATIENT)
Dept: OUTPATIENT SERVICES | Facility: HOSPITAL | Age: 68
LOS: 1 days | End: 2018-12-10
Payer: MEDICARE

## 2018-12-10 ENCOUNTER — APPOINTMENT (OUTPATIENT)
Dept: RADIOLOGY | Facility: CLINIC | Age: 68
End: 2018-12-10

## 2018-12-10 ENCOUNTER — APPOINTMENT (OUTPATIENT)
Dept: ORTHOPEDIC SURGERY | Facility: CLINIC | Age: 68
End: 2018-12-10
Payer: MEDICARE

## 2018-12-10 VITALS — HEIGHT: 62 IN | BODY MASS INDEX: 33.13 KG/M2 | RESPIRATION RATE: 16 BRPM | WEIGHT: 180 LBS

## 2018-12-10 DIAGNOSIS — S62.521D DISPLACED FRACTURE OF DISTAL PHALANX OF RIGHT THUMB, SUBSEQUENT ENCOUNTER FOR FRACTURE WITH ROUTINE HEALING: ICD-10-CM

## 2018-12-10 PROCEDURE — 99024 POSTOP FOLLOW-UP VISIT: CPT

## 2018-12-10 PROCEDURE — 73030 X-RAY EXAM OF SHOULDER: CPT | Mod: 26,RT

## 2018-12-10 PROCEDURE — 73030 X-RAY EXAM OF SHOULDER: CPT

## 2019-01-22 ENCOUNTER — FORM ENCOUNTER (OUTPATIENT)
Age: 69
End: 2019-01-22

## 2019-01-23 ENCOUNTER — APPOINTMENT (OUTPATIENT)
Dept: ORTHOPEDIC SURGERY | Facility: CLINIC | Age: 69
End: 2019-01-23
Payer: MEDICARE

## 2019-01-23 ENCOUNTER — OUTPATIENT (OUTPATIENT)
Dept: OUTPATIENT SERVICES | Facility: HOSPITAL | Age: 69
LOS: 1 days | End: 2019-01-23
Payer: MEDICARE

## 2019-01-23 ENCOUNTER — APPOINTMENT (OUTPATIENT)
Dept: RADIOLOGY | Facility: CLINIC | Age: 69
End: 2019-01-23

## 2019-01-23 DIAGNOSIS — S42.291A OTHER DISPLACED FRACTURE OF UPPER END OF RIGHT HUMERUS, INITIAL ENCOUNTER FOR CLOSED FRACTURE: ICD-10-CM

## 2019-01-23 PROCEDURE — 99024 POSTOP FOLLOW-UP VISIT: CPT

## 2019-01-23 PROCEDURE — 73030 X-RAY EXAM OF SHOULDER: CPT

## 2019-01-23 PROCEDURE — 73030 X-RAY EXAM OF SHOULDER: CPT | Mod: 26,RT

## 2019-04-04 ENCOUNTER — EMERGENCY (EMERGENCY)
Facility: HOSPITAL | Age: 69
LOS: 1 days | Discharge: HOME | End: 2019-04-04
Attending: EMERGENCY MEDICINE | Admitting: EMERGENCY MEDICINE
Payer: MEDICARE

## 2019-04-04 VITALS
OXYGEN SATURATION: 95 % | SYSTOLIC BLOOD PRESSURE: 152 MMHG | HEART RATE: 103 BPM | RESPIRATION RATE: 18 BRPM | DIASTOLIC BLOOD PRESSURE: 94 MMHG

## 2019-04-04 VITALS
DIASTOLIC BLOOD PRESSURE: 106 MMHG | HEART RATE: 101 BPM | RESPIRATION RATE: 16 BRPM | SYSTOLIC BLOOD PRESSURE: 146 MMHG | TEMPERATURE: 98 F | OXYGEN SATURATION: 95 %

## 2019-04-04 DIAGNOSIS — S52.501A UNSPECIFIED FRACTURE OF THE LOWER END OF RIGHT RADIUS, INITIAL ENCOUNTER FOR CLOSED FRACTURE: ICD-10-CM

## 2019-04-04 DIAGNOSIS — Z79.2 LONG TERM (CURRENT) USE OF ANTIBIOTICS: ICD-10-CM

## 2019-04-04 DIAGNOSIS — I25.10 ATHEROSCLEROTIC HEART DISEASE OF NATIVE CORONARY ARTERY WITHOUT ANGINA PECTORIS: ICD-10-CM
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
## 2019-04-04 DIAGNOSIS — I10 ESSENTIAL (PRIMARY) HYPERTENSION: ICD-10-CM
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
## 2019-04-04 DIAGNOSIS — Z79.1 LONG TERM (CURRENT) USE OF NON-STEROIDAL ANTI-INFLAMMATORIES (NSAID): ICD-10-CM

## 2019-04-04 DIAGNOSIS — Z79.899 OTHER LONG TERM (CURRENT) DRUG THERAPY: ICD-10-CM
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
## 2019-04-04 DIAGNOSIS — Z79.891 LONG TERM (CURRENT) USE OF OPIATE ANALGESIC: ICD-10-CM

## 2019-04-04 DIAGNOSIS — M25.531 PAIN IN RIGHT WRIST: ICD-10-CM

## 2019-04-04 PROCEDURE — 73110 X-RAY EXAM OF WRIST: CPT | Mod: 26,RT

## 2019-04-04 PROCEDURE — 25605 CLTX DST RDL FX/EPHYS SEP W/: CPT | Mod: 54

## 2019-04-04 PROCEDURE — 99284 EMERGENCY DEPT VISIT MOD MDM: CPT | Mod: 25

## 2019-04-04 PROCEDURE — 70450 CT HEAD/BRAIN W/O DYE: CPT | Mod: 26

## 2019-04-04 RX ORDER — MORPHINE SULFATE 50 MG/1
4 CAPSULE, EXTENDED RELEASE ORAL ONCE
Qty: 0 | Refills: 0 | Status: DISCONTINUED | OUTPATIENT
Start: 2019-04-04 | End: 2019-04-04

## 2019-04-04 RX ORDER — OXYCODONE AND ACETAMINOPHEN 5; 325 MG/1; MG/1
2 TABLET ORAL ONCE
Qty: 0 | Refills: 0 | Status: DISCONTINUED | OUTPATIENT
Start: 2019-04-04 | End: 2019-04-04

## 2019-04-04 RX ADMIN — MORPHINE SULFATE 4 MILLIGRAM(S): 50 CAPSULE, EXTENDED RELEASE ORAL at 19:43

## 2019-04-04 RX ADMIN — MORPHINE SULFATE 4 MILLIGRAM(S): 50 CAPSULE, EXTENDED RELEASE ORAL at 20:52

## 2019-04-04 RX ADMIN — OXYCODONE AND ACETAMINOPHEN 2 TABLET(S): 5; 325 TABLET ORAL at 22:50

## 2019-04-04 RX ADMIN — MORPHINE SULFATE 4 MILLIGRAM(S): 50 CAPSULE, EXTENDED RELEASE ORAL at 21:02

## 2019-04-04 NOTE — ED PROVIDER NOTE - PHYSICAL EXAMINATION
CONSTITUTIONAL: Well appearing, well nourished, awake, alert, oriented to person, place, time/situation and in no apparent distress.  ENT: Airway patent, Nasal mucosa clear. Mouth with normal mucosa.  EYES: Clear bilaterally.  RESPIRATORY: Breathing comfortably with normal RR.  MSK: Right wrist bony deformity, mild swelling, tenderness over distal radius decreased ROM due to pain, DPI, NB,I form compartment soft from hand digits and elbows. Capillary refill less than 2 second.   NEURO: Alert and oriented, no focal deficits.  SKIN: Skin normal color for race, warm, dry and intact.   PSYCH: Alert and oriented to person, place, time/situation. normal mood and affect. CONSTITUTIONAL: Well appearing, well nourished, awake, alert, oriented to person, place, time/situation and in no apparent distress.  ENT: Airway patent, Nasal mucosa clear. Mouth with normal mucosa.  EYES: Clear bilaterally.  RESPIRATORY: Breathing comfortably with normal RR.  MSK: Right wrist bony deformity, mild swelling, tenderness over distal radius decreased ROM due to pain, DPI, NBI, form compartment soft from hand digits and elbows. Capillary refill less than 2 second.   NEURO: Alert and oriented, no focal deficits.  SKIN: Skin normal color for race, warm, dry and intact.   PSYCH: Alert and oriented to person, place, time/situation. normal mood and affect.

## 2019-04-04 NOTE — ED PROVIDER NOTE - CARE PROVIDER_API CALL
Michael Dyson)  Surgery of the Hand  [address] [place] [address]  Phone: [phone]  Fax: [phone]  Follow Up Time:

## 2019-04-04 NOTE — ED ADULT NURSE NOTE - OBJECTIVE STATEMENT
pt is a 67 y/o female presents to the ED c/o right wrist pain, hematoma to left forehead, and abrasion to right knee s/p mechanical fall from uneven pavement. denies LOC, anticoagulants. +deformity to right wrist (pain radiating up to right shoulder).

## 2019-04-04 NOTE — ED ADULT NURSE REASSESSMENT NOTE - NS ED NURSE REASSESS COMMENT FT1
Ortho MD and MD Marx at bedside at this time reducing arm. Pain medication given as ordered will continue to monitor

## 2019-04-04 NOTE — ED PROVIDER NOTE - DIAGNOSTIC INTERPRETATION
Interpreted by ED physician Araseli   Right wrist x-ray, 3 views   Positive distal radius fracture, mild angulation, soft tissue swelling

## 2019-04-04 NOTE — ED PROVIDER NOTE - CLINICAL SUMMARY MEDICAL DECISION MAKING FREE TEXT BOX
Pt presents to the ED c/o fall today. Pt will be giving IV morphine for pain control and order right wrist x-ray.    Right wrist x-ray   Positive distal radius fracture, mild angulation, soft tissue swelling 69 y/o Female presents to the ED c/o fall today. Pt will be giving IV morphine for pain control and order right wrist x-ray.    Right wrist x-ray   Positive distal radius fracture, mild angulation, soft tissue swelling 69 y/o Female presents to the ED c/o fall today. Pt will be giving IV morphine for pain control and order right wrist x-ray.    Right wrist x-ray   Positive distal radius fracture, mild angulation, soft tissue swelling    Reduced in ED after hematoma block, splint placed, stable for dc home

## 2019-04-04 NOTE — ED ADULT TRIAGE NOTE - CHIEF COMPLAINT QUOTE
Patient trip and fall, breaking her fall with hands, now complaining of right wrist pain + deformity. Denies LOC or blood thinner use.

## 2019-04-04 NOTE — ED PROVIDER NOTE - OBJECTIVE STATEMENT
67 y/o Female with PMHx of MI 15 years ago(on metoprolol) and HTN( on losartan), NKDA, presents to the ED c/o fall today. Pt states she fell on the torn up street where it will be repaved and landed on her face with her right hand folded.  Denies LOC, and f/c/n/v. Tetanus is updated. 67 y/o Female with PMHx of MI 15 years ago(on metoprolol) and HTN( on losartan), NKDA, presents to the ED c/o fall today. Pt states she fell on the torn up street where it will be repaved and landed on her face with her right hand folded.  Denies LOC, and f/c/n/v. Tetanus is UTD.

## 2019-04-10 ENCOUNTER — APPOINTMENT (OUTPATIENT)
Dept: ORTHOPEDIC SURGERY | Facility: CLINIC | Age: 69
End: 2019-04-10
Payer: MEDICARE

## 2019-04-10 VITALS — HEIGHT: 62 IN | WEIGHT: 200 LBS | BODY MASS INDEX: 36.8 KG/M2

## 2019-04-10 PROCEDURE — 25605 CLTX DST RDL FX/EPHYS SEP W/: CPT | Mod: RT

## 2019-04-10 PROCEDURE — 73110 X-RAY EXAM OF WRIST: CPT | Mod: RT

## 2019-04-10 PROCEDURE — 99214 OFFICE O/P EST MOD 30 MIN: CPT | Mod: 57

## 2019-04-10 RX ORDER — OXYCODONE AND ACETAMINOPHEN 5; 325 MG/1; MG/1
5-325 TABLET ORAL EVERY 6 HOURS
Qty: 28 | Refills: 0 | Status: DISCONTINUED | COMMUNITY
Start: 2018-11-19 | End: 2019-04-10

## 2019-04-10 RX ORDER — LEVOFLOXACIN 500 MG/1
500 TABLET, FILM COATED ORAL
Qty: 7 | Refills: 0 | Status: DISCONTINUED | COMMUNITY
Start: 2018-02-12 | End: 2019-04-10

## 2019-04-10 RX ORDER — DIAZEPAM 5 MG/1
5 TABLET ORAL EVERY 8 HOURS
Qty: 30 | Refills: 0 | Status: DISCONTINUED | COMMUNITY
Start: 2018-11-21 | End: 2019-04-10

## 2019-04-10 RX ORDER — OXYCODONE AND ACETAMINOPHEN 5; 325 MG/1; MG/1
5-325 TABLET ORAL
Qty: 30 | Refills: 0 | Status: DISCONTINUED | COMMUNITY
Start: 2018-11-14 | End: 2019-04-10

## 2019-04-10 RX ORDER — DIAZEPAM 2 MG/1
2 TABLET ORAL TWICE DAILY
Qty: 14 | Refills: 0 | Status: DISCONTINUED | COMMUNITY
Start: 2018-11-05 | End: 2019-04-10

## 2019-05-12 ENCOUNTER — FORM ENCOUNTER (OUTPATIENT)
Age: 69
End: 2019-05-12

## 2019-05-13 ENCOUNTER — OUTPATIENT (OUTPATIENT)
Dept: OUTPATIENT SERVICES | Facility: HOSPITAL | Age: 69
LOS: 1 days | End: 2019-05-13
Payer: MEDICARE

## 2019-05-13 ENCOUNTER — APPOINTMENT (OUTPATIENT)
Dept: RADIOLOGY | Facility: CLINIC | Age: 69
End: 2019-05-13

## 2019-05-13 ENCOUNTER — APPOINTMENT (OUTPATIENT)
Dept: ORTHOPEDIC SURGERY | Facility: CLINIC | Age: 69
End: 2019-05-13
Payer: MEDICARE

## 2019-05-13 PROCEDURE — 99024 POSTOP FOLLOW-UP VISIT: CPT

## 2019-05-13 PROCEDURE — 73110 X-RAY EXAM OF WRIST: CPT

## 2019-05-13 PROCEDURE — 73110 X-RAY EXAM OF WRIST: CPT | Mod: 26,RT

## 2019-05-20 ENCOUNTER — APPOINTMENT (OUTPATIENT)
Dept: ORTHOPEDIC SURGERY | Facility: CLINIC | Age: 69
End: 2019-05-20

## 2019-06-02 ENCOUNTER — FORM ENCOUNTER (OUTPATIENT)
Age: 69
End: 2019-06-02

## 2019-06-03 ENCOUNTER — APPOINTMENT (OUTPATIENT)
Dept: RADIOLOGY | Facility: CLINIC | Age: 69
End: 2019-06-03

## 2019-06-03 ENCOUNTER — OUTPATIENT (OUTPATIENT)
Dept: OUTPATIENT SERVICES | Facility: HOSPITAL | Age: 69
LOS: 1 days | End: 2019-06-03
Payer: MEDICARE

## 2019-06-03 ENCOUNTER — APPOINTMENT (OUTPATIENT)
Dept: ORTHOPEDIC SURGERY | Facility: CLINIC | Age: 69
End: 2019-06-03
Payer: MEDICARE

## 2019-06-03 VITALS — BODY MASS INDEX: 36.8 KG/M2 | RESPIRATION RATE: 16 BRPM | HEIGHT: 62 IN | WEIGHT: 200 LBS

## 2019-06-03 DIAGNOSIS — S52.551A OTHER EXTRAARTICULAR FRACTURE OF LOWER END OF RIGHT RADIUS, INITIAL ENCOUNTER FOR CLOSED FRACTURE: ICD-10-CM

## 2019-06-03 PROCEDURE — 73110 X-RAY EXAM OF WRIST: CPT | Mod: 26,RT

## 2019-06-03 PROCEDURE — 99024 POSTOP FOLLOW-UP VISIT: CPT

## 2019-06-03 PROCEDURE — 73110 X-RAY EXAM OF WRIST: CPT

## 2019-07-10 ENCOUNTER — APPOINTMENT (OUTPATIENT)
Dept: ORTHOPEDIC SURGERY | Facility: CLINIC | Age: 69
End: 2019-07-10
Payer: MEDICARE

## 2019-07-10 VITALS — WEIGHT: 200 LBS | BODY MASS INDEX: 36.8 KG/M2 | HEIGHT: 62 IN | RESPIRATION RATE: 16 BRPM

## 2019-07-10 DIAGNOSIS — S52.601P UNSPECIFIED FRACTURE OF THE LOWER END OF RIGHT RADIUS, SUBSEQUENT ENCOUNTER FOR CLOSED FRACTURE WITH MALUNION: ICD-10-CM

## 2019-07-10 DIAGNOSIS — S52.501P UNSPECIFIED FRACTURE OF THE LOWER END OF RIGHT RADIUS, SUBSEQUENT ENCOUNTER FOR CLOSED FRACTURE WITH MALUNION: ICD-10-CM

## 2019-07-10 PROCEDURE — 99213 OFFICE O/P EST LOW 20 MIN: CPT

## 2019-07-10 NOTE — REASON FOR VISIT
[Follow-Up Visit] : a follow-up visit for [FreeTextEntry2] :  Right distal radius fracture s/p closed management with loss of reduction

## 2019-07-10 NOTE — HISTORY OF PRESENT ILLNESS
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 Patient states that she is experiencing some discomfort and swelling. She still is lacking range of motion of the right wrist and she is becoming concerned since she has been having difficulty obtaining PT. She states she has not attended PT because she can't find one covered by her insurance and she called her insurance company directly and was told they could not help her. She denies an numbness or tingling ini the affected area. She would also occasionally take an antiinflammatory if the pain is unbearable, she has no other complaints at this time.\par \par \par 6-03-19:\par DOI: 4-04-19; s/p 8.5 weeks.  Pt. is 69 yrs old following up for the above condition. Patient suffered a loss of reduction between her 10 April and 13 May visits after electing for close management. There was a delay between the time a visitor subsequent evaluation. Based on x-rays at her last visit we discussed continued closed management versus immediate intervention with osteoclasis and fixation. Patient elected for continued closed management. She was positioned into a cockup wrist splint. \par Today patient reports she has been wearing the wrist splint as instructed and removing for self-directed exercises as prescribed. She feels the finger range of motion with flexion extension is significantly improved. She continues to have achy pain diffusely throughout the hand and thumb as well as hyperesthesias in the palm and forearm. She reports swelling in the forearm as well. She reports intermittent sharp pains. She is not perform any formal occupational therapy today. Today patient reports primary concern is her significant anxiety regarding long-term wrist function and outcome\par \par \par 5-13-19:\par DOI: 4-04-19; s/p 5 weeks\par 69-year-old female presents in slightly delayed fashion in followup for her right distal radius fracture. She was last seen approximately 5 weeks ago. At that time she was transitioned from a sugar tong splint to a fiberglass cast. Postreduction fluoroscopy images demonstrated near anatomic reduction and maintenance of alignment. Patient was instructed to follow up in 2-3 weeks. Today she states that there was apparently some confusion regarding her timeline. She's had increasing pain over the last 2 weeks. She has noticed that these cast seems to move. Patient admits that she has actually been manually adjusting the cast position as it felt as if it was constricting her hand. She also reports performing active pronation supination activities throughout the day in effort to regain motion the wrist. This actually seems to have contributed to development of pain based on her timeline. He is currently moderate to severe. She reports diffuse changes in the swelling of her digits. She will be noted patient has underlying fibromyalgia.\par \par We had previously discussed both open and closed management with the patient but due to the presence of her fibromyalgia and overall health as well as her desire to avoid surgery she elected for closed management.  She has since reconsidered these options and would like to discuss them based on x-ray findings if possible.

## 2019-07-10 NOTE — PHYSICAL EXAM
[de-identified] : Physical examination of the right upper extremity was performed. Significant improvement from her last exam. Skin is intact without any abnormalities in coloration or perfusion. Texture of the skin is within normal limits. No hyperesthesias today as she had diffusely at her last visit. 2+ pulses and cap refill less than 2 seconds without abnormality. Range of motion of all digits is within normal limits. She is able to make a composite fist on exam today. There is mild tenderness to palpation involving the first second third and fourth extensor compartments. Sensation is intact to light touch in median radial and ulnar nerve distributions as his motor function. ROM: 35 degrees supination, 65 pronation, 15 flexion, 30 extension.

## 2019-07-10 NOTE — DISCUSSION/SUMMARY
[Surgical risks reviewed] : Surgical risks reviewed [de-identified] : Patient was counseled regarding her findings. She is concerned about her continued restricted range of motion as well as persistent pain. She does continue to have somewhat limited range of motion likely due to malunion following her loss of reduction that occurred between her April 10th and May 13th visit. Her current condition is also likely compounded by the fact that she has not done any occupational therapy.\par \par Risks and benefits of corrective osteotomy were again discussed with the patient were reviewed. The patient was also offered a referral to a hand specialist for a second opinion but she declined. The other option would be pursuing hand therapy to see if this helps her pain and function. Her presentation today is much less concerning for CRPS than it was at her previous examination. She is achy but does not have the same hypersensitivity she had at her last visit. \par \par After counseling on risks and benefits of each approach, the patient states she is not interested in pursuing surgery as an option at this time. She has opted to again try attending hand therapy again. New hand therapy prescription was provided. Followup on an as needed basis if she would like to consider corrective osteotomy or if she experiences worsening of symptoms.

## 2019-10-29 ENCOUNTER — EMERGENCY (EMERGENCY)
Facility: HOSPITAL | Age: 69
LOS: 1 days | Discharge: HOME | End: 2019-10-29
Admitting: EMERGENCY MEDICINE
Payer: MEDICARE

## 2019-10-29 VITALS
WEIGHT: 220.02 LBS | TEMPERATURE: 98 F | SYSTOLIC BLOOD PRESSURE: 145 MMHG | HEIGHT: 62 IN | DIASTOLIC BLOOD PRESSURE: 81 MMHG | OXYGEN SATURATION: 98 % | RESPIRATION RATE: 15 BRPM | HEART RATE: 102 BPM

## 2019-10-29 PROCEDURE — 73630 X-RAY EXAM OF FOOT: CPT | Mod: 26,LT

## 2019-10-29 PROCEDURE — 73590 X-RAY EXAM OF LOWER LEG: CPT | Mod: 26,RT

## 2019-10-29 PROCEDURE — 99284 EMERGENCY DEPT VISIT MOD MDM: CPT | Mod: 25

## 2019-10-29 PROCEDURE — 28470 CLTX METATARSAL FX WO MNP EA: CPT | Mod: 54

## 2019-10-29 PROCEDURE — 73564 X-RAY EXAM KNEE 4 OR MORE: CPT | Mod: 26,RT

## 2019-10-29 PROCEDURE — 73650 X-RAY EXAM OF HEEL: CPT | Mod: 26,LT,59

## 2019-10-29 RX ORDER — IBUPROFEN 200 MG
600 TABLET ORAL ONCE
Refills: 0 | Status: COMPLETED | OUTPATIENT
Start: 2019-10-29 | End: 2019-10-29

## 2019-10-29 RX ORDER — OXYCODONE AND ACETAMINOPHEN 5; 325 MG/1; MG/1
1 TABLET ORAL ONCE
Refills: 0 | Status: DISCONTINUED | OUTPATIENT
Start: 2019-10-29 | End: 2019-10-29

## 2019-10-29 RX ADMIN — OXYCODONE AND ACETAMINOPHEN 1 TABLET(S): 5; 325 TABLET ORAL at 22:21

## 2019-10-29 RX ADMIN — Medication 600 MILLIGRAM(S): at 22:22

## 2019-10-29 NOTE — ED ADULT NURSE NOTE - CHPI ED NUR SYMPTOMS NEG
no deformity/no fever/no tingling/no abrasion/no confusion/no numbness/no loss of consciousness/no vomiting/no weakness

## 2019-10-29 NOTE — ED ADULT TRIAGE NOTE - CHIEF COMPLAINT QUOTE
Pt with complaint of right shin and left foot pain after pt tripped and fell while walking on uneven sidewalk. Pt denies any head injuries. Noted to have swelling to right lower leg.

## 2019-10-29 NOTE — ED ADULT NURSE NOTE - OBJECTIVE STATEMENT
70 y/o F c/o R shin and L foot pn after fall on uneven sidewalk. Pt denies hitting head, no visible head trauma, no LOC. R shin appears bruised and swollen. Pt unable to bear weight. Pulse/motor/sensory intact B/L. PMH of cardiac stent, HLD, HTN.

## 2019-10-30 VITALS
HEART RATE: 78 BPM | DIASTOLIC BLOOD PRESSURE: 79 MMHG | OXYGEN SATURATION: 98 % | RESPIRATION RATE: 18 BRPM | TEMPERATURE: 99 F | SYSTOLIC BLOOD PRESSURE: 115 MMHG

## 2019-10-30 PROCEDURE — 73562 X-RAY EXAM OF KNEE 3: CPT | Mod: 26,RT

## 2019-10-30 PROCEDURE — 71045 X-RAY EXAM CHEST 1 VIEW: CPT | Mod: 26

## 2019-10-30 PROCEDURE — 73630 X-RAY EXAM OF FOOT: CPT | Mod: 26,LT

## 2019-10-30 PROCEDURE — 73590 X-RAY EXAM OF LOWER LEG: CPT | Mod: 26,RT

## 2019-10-30 RX ORDER — IBUPROFEN 200 MG
1 TABLET ORAL
Qty: 20 | Refills: 0
Start: 2019-10-30 | End: 2019-11-03

## 2019-10-30 RX ORDER — OXYCODONE AND ACETAMINOPHEN 5; 325 MG/1; MG/1
1 TABLET ORAL ONCE
Refills: 0 | Status: DISCONTINUED | OUTPATIENT
Start: 2019-10-30 | End: 2019-10-30

## 2019-10-30 RX ORDER — IPRATROPIUM/ALBUTEROL SULFATE 18-103MCG
3 AEROSOL WITH ADAPTER (GRAM) INHALATION ONCE
Refills: 0 | Status: COMPLETED | OUTPATIENT
Start: 2019-10-30 | End: 2019-10-30

## 2019-10-30 RX ORDER — DIAZEPAM 5 MG
2.5 TABLET ORAL ONCE
Refills: 0 | Status: DISCONTINUED | OUTPATIENT
Start: 2019-10-30 | End: 2019-10-30

## 2019-10-30 RX ADMIN — Medication 2.5 MILLIGRAM(S): at 03:16

## 2019-10-30 RX ADMIN — Medication 3 MILLILITER(S): at 01:12

## 2019-10-30 RX ADMIN — OXYCODONE AND ACETAMINOPHEN 1 TABLET(S): 5; 325 TABLET ORAL at 04:32

## 2019-10-30 NOTE — ED PROVIDER NOTE - PATIENT PORTAL LINK FT
You can access the FollowMyHealth Patient Portal offered by [facility] by registering at the following website: [email or web address]. By joining [email or web address]’s FollowMyHealth portal, you will also be able to view your health information using other applications (apps) compatible with our system.

## 2019-10-30 NOTE — ED ADULT NURSE REASSESSMENT NOTE - NS ED NURSE REASSESS COMMENT FT1
Pt has boot on left foot, given crutches , pt has used them before, reminded how to use, pt able to pivot onto chair to use bedpan, passed urine, pt now back in bed, awaiting transport ambulance home

## 2019-10-30 NOTE — ED PROVIDER NOTE - NSFOLLOWUPCLINICS_GEN_ALL_ED_FT
[facility]ry [facility]  Podiatry  [address] [place] [address]  Phone: [phone]  Fax:   Follow Up Time:

## 2019-10-30 NOTE — ED PROVIDER NOTE - PMH
Colon polyp    Coronary artery disease    Fibromyalgia    Hepatitis C    Herniated disc, cervical    Myocardial infarction, unspecified MI type, unspecified artery

## 2019-10-30 NOTE — ED PROVIDER NOTE - SKIN, MLM
Skin normal color for race, warm, dry and intact. No evidence of rash. see bruising noted above. no abrasions.

## 2019-10-30 NOTE — ED PROVIDER NOTE - MUSCULOSKELETAL, MLM
Spine appears normal, range of motion is not limited, +tenderness over right proximal shin, distal to tibial plateau, some swelling and bruising noted to the area, minimal swelling and tenderness to medial inferior patella on right with 2cm bruising, tenderness and swelling to the left lateral foot at the base of the 5th metacarpal, normal ROM.

## 2019-10-30 NOTE — ED PROVIDER NOTE - PROGRESS NOTE DETAILS
pt reports pain significantly improved after percocet and motrin. pt notes she has been having some wheezing and was recently diagnosed with emphysema.     LUNG EXAM significant for wheezing throughout bilat lung fields.   CARDIAC EXAM:  RRR, no murmurs, rubs, gallops.   will give duoneb. pt requesting cxr. signed out to night team pending xrays and duoneb.

## 2019-10-30 NOTE — ED PROVIDER NOTE - OBJECTIVE STATEMENT
70yo F with h/o MI 15 years ago, htn, hld presents today c/o bilat lower extremity pain, right knee/proximal shin and left lateral foot, after a mechanical trip and fall. pt denies any head injury or LOC. denies headache, vision changes, nausea, vomiting, cp, sob, dizziness, numbness, tingling, weakness. no blood thinners taken. pt was not ambulatory after the fall 2/2 pain.

## 2019-10-30 NOTE — ED ADULT NURSE REASSESSMENT NOTE - NS ED NURSE REASSESS COMMENT FT1
[facility] have decided not to take pt, crew sitting down talking watching videos while pt is in the dark as to whats happening, I have spoken with crew,   they are waiting for another crew to meet them at the house, I have asked that they please explain this to the patient

## 2019-10-30 NOTE — ED PROVIDER NOTE - CLINICAL SUMMARY MEDICAL DECISION MAKING FREE TEXT BOX
pt presents c/o fall with resulting right knee and tib/fib pain and left lateral foot pain with corresponding swelling and tenderness on exam. xrays pending. [facility] through visit pt notes she has been having wheezing, noted on exam. duoneb and cxr ordered. signed out pending xrays. pt presents c/o fall with resulting right knee and tib/fib pain and left lateral foot pain with corresponding swelling and tenderness on exam. xrays pending. senior living through visit pt notes she has been having wheezing, noted on exam. duoneb and cxr ordered. signed out pending xrays.    Interpreted by ED physician calcaneous x-ray, 2 views  No fracture, no dislocation (joint spaces grossly normal), no Foreign Body noted, soft tissue normal  left foot x-ray, 3 views +5th metatarsal  fracture, no dislocation (joint spaces grossly normal), no Foreign Body noted, soft tissue normal  right knee x-ray, 4 vlocation (joint spaces grossly normal), no Foreign Body noted, soft tissue normal  Interpreted by ED physician  right tib fib x-ray, 2 views  No fracture, no dislocation (joint spaces grossly normal), no Foreign Body noted, soft tissue normal  Interpreted by ED physician  *****results do not fit in results tab

## 2019-11-04 DIAGNOSIS — S92.352A DISPLACED FRACTURE OF FIFTH METATARSAL BONE, LEFT FOOT, INITIAL ENCOUNTER FOR CLOSED FRACTURE: ICD-10-CM

## 2019-11-04 DIAGNOSIS — S80.11XA CONTUSION OF RIGHT LOWER LEG, INITIAL ENCOUNTER: ICD-10-CM
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
## 2019-11-04 DIAGNOSIS — Z79.2 LONG TERM (CURRENT) USE OF ANTIBIOTICS: ICD-10-CM
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
## 2019-11-04 DIAGNOSIS — M79.89 OTHER SPECIFIED SOFT TISSUE DISORDERS: ICD-10-CM

## 2019-11-04 DIAGNOSIS — M79.661 PAIN IN RIGHT LOWER LEG: ICD-10-CM

## 2019-11-04 DIAGNOSIS — Z79.1 LONG TERM (CURRENT) USE OF NON-STEROIDAL ANTI-INFLAMMATORIES (NSAID): ICD-10-CM
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
## 2019-11-04 DIAGNOSIS — Z79.52 LONG TERM (CURRENT) USE OF SYSTEMIC STEROIDS: ICD-10-CM

## 2019-11-04 DIAGNOSIS — Z87.891 PERSONAL HISTORY OF NICOTINE DEPENDENCE: ICD-10-CM

## 2019-11-04 DIAGNOSIS — Z79.891 LONG TERM (CURRENT) USE OF OPIATE ANALGESIC: ICD-10-CM

## 2019-11-06 ENCOUNTER — APPOINTMENT (OUTPATIENT)
Dept: ORTHOPEDIC SURGERY | Facility: CLINIC | Age: 69
End: 2019-11-06

## 2019-11-06 PROBLEM — I21.9 ACUTE MYOCARDIAL INFARCTION, UNSPECIFIED: Chronic | Status: ACTIVE | Noted: 2019-10-30

## 2019-11-11 ENCOUNTER — APPOINTMENT (OUTPATIENT)
Dept: ORTHOPEDIC SURGERY | Facility: CLINIC | Age: 69
End: 2019-11-11

## 2019-11-25 ENCOUNTER — APPOINTMENT (OUTPATIENT)
Dept: ORTHOPEDIC SURGERY | Facility: CLINIC | Age: 69
End: 2019-11-25

## 2019-12-15 ENCOUNTER — FORM ENCOUNTER (OUTPATIENT)
Age: 69
End: 2019-12-15

## 2019-12-16 ENCOUNTER — OUTPATIENT (OUTPATIENT)
Dept: OUTPATIENT SERVICES | Facility: HOSPITAL | Age: 69
LOS: 1 days | End: 2019-12-16

## 2019-12-16 ENCOUNTER — APPOINTMENT (OUTPATIENT)
Dept: ORTHOPEDIC SURGERY | Facility: CLINIC | Age: 69
End: 2019-12-16
Payer: MEDICARE

## 2019-12-16 ENCOUNTER — APPOINTMENT (OUTPATIENT)
Dept: ULTRASOUND IMAGING | Facility: CLINIC | Age: 69
End: 2019-12-16
Payer: MEDICARE

## 2019-12-16 VITALS — WEIGHT: 200 LBS | RESPIRATION RATE: 16 BRPM | BODY MASS INDEX: 36.8 KG/M2 | HEIGHT: 62 IN

## 2019-12-16 DIAGNOSIS — M54.5 LOW BACK PAIN: ICD-10-CM

## 2019-12-16 DIAGNOSIS — M79.672 PAIN IN LEFT FOOT: ICD-10-CM

## 2019-12-16 DIAGNOSIS — J34.9 UNSPECIFIED DISORDER OF NOSE AND NASAL SINUSES: ICD-10-CM

## 2019-12-16 DIAGNOSIS — M79.89 OTHER SPECIFIED SOFT TISSUE DISORDERS: ICD-10-CM

## 2019-12-16 DIAGNOSIS — S92.352A DISPLACED FRACTURE OF FIFTH METATARSAL BONE, LEFT FOOT, INITIAL ENCOUNTER FOR CLOSED FRACTURE: ICD-10-CM

## 2019-12-16 DIAGNOSIS — R29.6 REPEATED FALLS: ICD-10-CM

## 2019-12-16 PROCEDURE — 99214 OFFICE O/P EST MOD 30 MIN: CPT | Mod: 57

## 2019-12-16 PROCEDURE — 28470 CLTX METATARSAL FX WO MNP EA: CPT | Mod: LT

## 2019-12-16 PROCEDURE — 93971 EXTREMITY STUDY: CPT | Mod: 26,RT

## 2020-09-09 NOTE — ED PROVIDER NOTE - NS ED MD DISPO DISCHARGE
----- Message from Frederick Howe MD sent at 9/9/2020  8:50 AM EDT -----  Please call the patient regarding dexa scan- shows osteopenia but may meet criteria for starting medication to decrease risk of fracture - will discuss on visit Home

## 2021-03-01 ENCOUNTER — OUTPATIENT (OUTPATIENT)
Dept: OUTPATIENT SERVICES | Facility: HOSPITAL | Age: 71
LOS: 1 days | End: 2021-03-01
Payer: MEDICARE

## 2021-03-15 ENCOUNTER — INPATIENT (INPATIENT)
Facility: HOSPITAL | Age: 71
LOS: 7 days | Discharge: HOME HEALTH | DRG: 247 | End: 2021-03-23
Attending: HOSPITALIST | Admitting: HOSPITALIST
Payer: MEDICARE

## 2021-03-15 VITALS
RESPIRATION RATE: 16 BRPM | OXYGEN SATURATION: 98 % | WEIGHT: 250 LBS | HEIGHT: 62 IN | DIASTOLIC BLOOD PRESSURE: 69 MMHG | TEMPERATURE: 98 F | HEART RATE: 88 BPM | SYSTOLIC BLOOD PRESSURE: 124 MMHG

## 2021-03-15 DIAGNOSIS — M79.7 FIBROMYALGIA: ICD-10-CM

## 2021-03-15 DIAGNOSIS — I50.33 ACUTE ON CHRONIC DIASTOLIC (CONGESTIVE) HEART FAILURE: ICD-10-CM

## 2021-03-15 DIAGNOSIS — I48.91 UNSPECIFIED ATRIAL FIBRILLATION: ICD-10-CM

## 2021-03-15 DIAGNOSIS — E87.1 HYPO-OSMOLALITY AND HYPONATREMIA: ICD-10-CM

## 2021-03-15 DIAGNOSIS — E78.5 HYPERLIPIDEMIA, UNSPECIFIED: ICD-10-CM

## 2021-03-15 DIAGNOSIS — E87.6 HYPOKALEMIA: ICD-10-CM

## 2021-03-15 DIAGNOSIS — I10 ESSENTIAL (PRIMARY) HYPERTENSION: ICD-10-CM

## 2021-03-15 DIAGNOSIS — R07.89 OTHER CHEST PAIN: ICD-10-CM

## 2021-03-15 DIAGNOSIS — I25.10 ATHEROSCLEROTIC HEART DISEASE OF NATIVE CORONARY ARTERY WITHOUT ANGINA PECTORIS: ICD-10-CM

## 2021-03-15 DIAGNOSIS — Z96.641 PRESENCE OF RIGHT ARTIFICIAL HIP JOINT: Chronic | ICD-10-CM

## 2021-03-15 DIAGNOSIS — N17.9 ACUTE KIDNEY FAILURE, UNSPECIFIED: ICD-10-CM

## 2021-03-15 LAB
A1C WITH ESTIMATED AVERAGE GLUCOSE RESULT: 6.1 % — HIGH (ref 4–5.6)
ALBUMIN SERPL ELPH-MCNC: 3.9 G/DL — SIGNIFICANT CHANGE UP (ref 3.4–5)
ALBUMIN SERPL ELPH-MCNC: 4.5 G/DL — SIGNIFICANT CHANGE UP (ref 3.3–5)
ALP SERPL-CCNC: 108 U/L — SIGNIFICANT CHANGE UP (ref 40–120)
ALP SERPL-CCNC: 111 U/L — SIGNIFICANT CHANGE UP (ref 40–120)
ALT FLD-CCNC: 33 U/L — SIGNIFICANT CHANGE UP (ref 10–45)
ALT FLD-CCNC: 42 U/L — SIGNIFICANT CHANGE UP (ref 12–42)
ANION GAP SERPL CALC-SCNC: 10 MMOL/L — SIGNIFICANT CHANGE UP (ref 5–17)
ANION GAP SERPL CALC-SCNC: 11 MMOL/L — SIGNIFICANT CHANGE UP (ref 9–16)
ANION GAP SERPL CALC-SCNC: 13 MMOL/L — SIGNIFICANT CHANGE UP (ref 5–17)
ANION GAP SERPL CALC-SCNC: 14 MMOL/L — SIGNIFICANT CHANGE UP (ref 5–17)
ANION GAP SERPL CALC-SCNC: 15 MMOL/L — SIGNIFICANT CHANGE UP (ref 5–17)
APPEARANCE UR: CLEAR — SIGNIFICANT CHANGE UP
APTT BLD: 25.2 SEC — LOW (ref 27.5–35.5)
AST SERPL-CCNC: 31 U/L — SIGNIFICANT CHANGE UP (ref 10–40)
AST SERPL-CCNC: 36 U/L — SIGNIFICANT CHANGE UP (ref 15–37)
BASE EXCESS BLDV CALC-SCNC: 14.8 MMOL/L — SIGNIFICANT CHANGE UP
BASOPHILS # BLD AUTO: 0.04 K/UL — SIGNIFICANT CHANGE UP (ref 0–0.2)
BASOPHILS NFR BLD AUTO: 0.4 % — SIGNIFICANT CHANGE UP (ref 0–2)
BILIRUB SERPL-MCNC: 0.5 MG/DL — SIGNIFICANT CHANGE UP (ref 0.2–1.2)
BILIRUB SERPL-MCNC: 0.5 MG/DL — SIGNIFICANT CHANGE UP (ref 0.2–1.2)
BILIRUB UR-MCNC: NEGATIVE — SIGNIFICANT CHANGE UP
BUN SERPL-MCNC: 54 MG/DL — HIGH (ref 7–23)
BUN SERPL-MCNC: 55 MG/DL — HIGH (ref 7–23)
BUN SERPL-MCNC: 55 MG/DL — HIGH (ref 7–23)
BUN SERPL-MCNC: 60 MG/DL — HIGH (ref 7–23)
BUN SERPL-MCNC: 71 MG/DL — HIGH (ref 7–23)
CA-I SERPL-SCNC: 1.07 MMOL/L — LOW (ref 1.12–1.3)
CALCIUM SERPL-MCNC: 9.2 MG/DL — SIGNIFICANT CHANGE UP (ref 8.4–10.5)
CALCIUM SERPL-MCNC: 9.3 MG/DL — SIGNIFICANT CHANGE UP (ref 8.4–10.5)
CALCIUM SERPL-MCNC: 9.5 MG/DL — SIGNIFICANT CHANGE UP (ref 8.5–10.5)
CALCIUM SERPL-MCNC: 9.7 MG/DL — SIGNIFICANT CHANGE UP (ref 8.4–10.5)
CALCIUM SERPL-MCNC: 9.9 MG/DL — SIGNIFICANT CHANGE UP (ref 8.4–10.5)
CHLORIDE SERPL-SCNC: 77 MMOL/L — LOW (ref 96–108)
CHLORIDE SERPL-SCNC: 82 MMOL/L — LOW (ref 96–108)
CHLORIDE SERPL-SCNC: 83 MMOL/L — LOW (ref 96–108)
CHLORIDE SERPL-SCNC: 84 MMOL/L — LOW (ref 96–108)
CHLORIDE SERPL-SCNC: 84 MMOL/L — LOW (ref 96–108)
CK MB CFR SERPL CALC: 5.2 NG/ML — SIGNIFICANT CHANGE UP (ref 0–6.7)
CK MB CFR SERPL CALC: 5.9 NG/ML — SIGNIFICANT CHANGE UP (ref 0–6.7)
CK SERPL-CCNC: 270 U/L — HIGH (ref 26–192)
CK SERPL-CCNC: 277 U/L — HIGH (ref 25–170)
CK SERPL-CCNC: 280 U/L — HIGH (ref 25–170)
CO2 SERPL-SCNC: 36 MMOL/L — HIGH (ref 22–31)
CO2 SERPL-SCNC: 37 MMOL/L — HIGH (ref 22–31)
CO2 SERPL-SCNC: 37 MMOL/L — HIGH (ref 22–31)
CO2 SERPL-SCNC: 39 MMOL/L — HIGH (ref 22–31)
CO2 SERPL-SCNC: 40 MMOL/L — HIGH (ref 22–31)
COLOR SPEC: YELLOW — SIGNIFICANT CHANGE UP
CREAT SERPL-MCNC: 1.1 MG/DL — SIGNIFICANT CHANGE UP (ref 0.5–1.3)
CREAT SERPL-MCNC: 1.11 MG/DL — SIGNIFICANT CHANGE UP (ref 0.5–1.3)
CREAT SERPL-MCNC: 1.27 MG/DL — SIGNIFICANT CHANGE UP (ref 0.5–1.3)
CREAT SERPL-MCNC: 1.32 MG/DL — HIGH (ref 0.5–1.3)
CREAT SERPL-MCNC: 1.51 MG/DL — HIGH (ref 0.5–1.3)
DIFF PNL FLD: NEGATIVE — SIGNIFICANT CHANGE UP
EOSINOPHIL # BLD AUTO: 0.06 K/UL — SIGNIFICANT CHANGE UP (ref 0–0.5)
EOSINOPHIL NFR BLD AUTO: 0.6 % — SIGNIFICANT CHANGE UP (ref 0–6)
ESTIMATED AVERAGE GLUCOSE: 128 MG/DL — HIGH (ref 68–114)
GAS PNL BLDV: 131 MMOL/L — LOW (ref 138–146)
GAS PNL BLDV: SIGNIFICANT CHANGE UP
GAS PNL BLDV: SIGNIFICANT CHANGE UP
GLUCOSE SERPL-MCNC: 102 MG/DL — HIGH (ref 70–99)
GLUCOSE SERPL-MCNC: 107 MG/DL — HIGH (ref 70–99)
GLUCOSE SERPL-MCNC: 116 MG/DL — HIGH (ref 70–99)
GLUCOSE SERPL-MCNC: 122 MG/DL — HIGH (ref 70–99)
GLUCOSE SERPL-MCNC: 147 MG/DL — HIGH (ref 70–99)
GLUCOSE UR QL: NEGATIVE — SIGNIFICANT CHANGE UP
HCO3 BLDV-SCNC: 41 MMOL/L — HIGH (ref 20–27)
HCT VFR BLD CALC: 41.8 % — SIGNIFICANT CHANGE UP (ref 34.5–45)
HCT VFR BLD CALC: 43 % — SIGNIFICANT CHANGE UP (ref 34.5–45)
HGB BLD-MCNC: 14.3 G/DL — SIGNIFICANT CHANGE UP (ref 11.5–15.5)
HGB BLD-MCNC: 14.8 G/DL — SIGNIFICANT CHANGE UP (ref 11.5–15.5)
IMM GRANULOCYTES NFR BLD AUTO: 0.4 % — SIGNIFICANT CHANGE UP (ref 0–1.5)
INR BLD: 0.95 — SIGNIFICANT CHANGE UP (ref 0.88–1.16)
KETONES UR-MCNC: NEGATIVE — SIGNIFICANT CHANGE UP
LEUKOCYTE ESTERASE UR-ACNC: NEGATIVE — SIGNIFICANT CHANGE UP
LYMPHOCYTES # BLD AUTO: 1.06 K/UL — SIGNIFICANT CHANGE UP (ref 1–3.3)
LYMPHOCYTES # BLD AUTO: 10.7 % — LOW (ref 13–44)
MAGNESIUM SERPL-MCNC: 1.8 MG/DL — SIGNIFICANT CHANGE UP (ref 1.6–2.6)
MAGNESIUM SERPL-MCNC: 2.7 MG/DL — HIGH (ref 1.6–2.6)
MAGNESIUM SERPL-MCNC: 2.9 MG/DL — HIGH (ref 1.6–2.6)
MCHC RBC-ENTMCNC: 28.1 PG — SIGNIFICANT CHANGE UP (ref 27–34)
MCHC RBC-ENTMCNC: 28.3 PG — SIGNIFICANT CHANGE UP (ref 27–34)
MCHC RBC-ENTMCNC: 34.2 GM/DL — SIGNIFICANT CHANGE UP (ref 32–36)
MCHC RBC-ENTMCNC: 34.4 GM/DL — SIGNIFICANT CHANGE UP (ref 32–36)
MCV RBC AUTO: 81.6 FL — SIGNIFICANT CHANGE UP (ref 80–100)
MCV RBC AUTO: 82.8 FL — SIGNIFICANT CHANGE UP (ref 80–100)
MONOCYTES # BLD AUTO: 0.89 K/UL — SIGNIFICANT CHANGE UP (ref 0–0.9)
MONOCYTES NFR BLD AUTO: 9 % — SIGNIFICANT CHANGE UP (ref 2–14)
NEUTROPHILS # BLD AUTO: 7.78 K/UL — HIGH (ref 1.8–7.4)
NEUTROPHILS NFR BLD AUTO: 78.9 % — HIGH (ref 43–77)
NITRITE UR-MCNC: NEGATIVE — SIGNIFICANT CHANGE UP
NRBC # BLD: 0 /100 WBCS — SIGNIFICANT CHANGE UP (ref 0–0)
NRBC # BLD: 0 /100 WBCS — SIGNIFICANT CHANGE UP (ref 0–0)
NT-PROBNP SERPL-SCNC: 464 PG/ML — HIGH (ref 0–300)
PCO2 BLDV: 59 MMHG — HIGH (ref 41–51)
PH BLDV: 7.46 — HIGH (ref 7.32–7.43)
PH UR: 6.5 — SIGNIFICANT CHANGE UP (ref 5–8)
PLATELET # BLD AUTO: 271 K/UL — SIGNIFICANT CHANGE UP (ref 150–400)
PLATELET # BLD AUTO: 289 K/UL — SIGNIFICANT CHANGE UP (ref 150–400)
PO2 BLDV: 49 MMHG — SIGNIFICANT CHANGE UP
POTASSIUM BLDV-SCNC: 2.8 MMOL/L — LOW (ref 3.5–4.9)
POTASSIUM SERPL-MCNC: 2 MMOL/L — CRITICAL LOW (ref 3.5–5.3)
POTASSIUM SERPL-MCNC: 2.4 MMOL/L — CRITICAL LOW (ref 3.5–5.3)
POTASSIUM SERPL-MCNC: 2.7 MMOL/L — CRITICAL LOW (ref 3.5–5.3)
POTASSIUM SERPL-MCNC: 2.9 MMOL/L — CRITICAL LOW (ref 3.5–5.3)
POTASSIUM SERPL-MCNC: 3.1 MMOL/L — LOW (ref 3.5–5.3)
POTASSIUM SERPL-SCNC: 2 MMOL/L — CRITICAL LOW (ref 3.5–5.3)
POTASSIUM SERPL-SCNC: 2.4 MMOL/L — CRITICAL LOW (ref 3.5–5.3)
POTASSIUM SERPL-SCNC: 2.7 MMOL/L — CRITICAL LOW (ref 3.5–5.3)
POTASSIUM SERPL-SCNC: 2.9 MMOL/L — CRITICAL LOW (ref 3.5–5.3)
POTASSIUM SERPL-SCNC: 3.1 MMOL/L — LOW (ref 3.5–5.3)
PROT SERPL-MCNC: 7.6 G/DL — SIGNIFICANT CHANGE UP (ref 6–8.3)
PROT SERPL-MCNC: 7.7 G/DL — SIGNIFICANT CHANGE UP (ref 6.4–8.2)
PROT UR-MCNC: NEGATIVE MG/DL — SIGNIFICANT CHANGE UP
PROTHROM AB SERPL-ACNC: 11.2 SEC — SIGNIFICANT CHANGE UP (ref 10.6–13.6)
RBC # BLD: 5.05 M/UL — SIGNIFICANT CHANGE UP (ref 3.8–5.2)
RBC # BLD: 5.27 M/UL — HIGH (ref 3.8–5.2)
RBC # FLD: 14.6 % — HIGH (ref 10.3–14.5)
RBC # FLD: 14.8 % — HIGH (ref 10.3–14.5)
SAO2 % BLDV: 85 % — SIGNIFICANT CHANGE UP
SARS-COV-2 RNA SPEC QL NAA+PROBE: SIGNIFICANT CHANGE UP
SODIUM SERPL-SCNC: 129 MMOL/L — LOW (ref 135–145)
SODIUM SERPL-SCNC: 131 MMOL/L — LOW (ref 132–145)
SODIUM SERPL-SCNC: 134 MMOL/L — LOW (ref 135–145)
SP GR SPEC: 1.01 — SIGNIFICANT CHANGE UP (ref 1–1.03)
TROPONIN I SERPL-MCNC: 0.16 NG/ML — HIGH (ref 0.02–0.06)
TROPONIN T SERPL-MCNC: 0.05 NG/ML — CRITICAL HIGH (ref 0–0.01)
TROPONIN T SERPL-MCNC: 0.05 NG/ML — CRITICAL HIGH (ref 0–0.01)
TSH SERPL-MCNC: 0.83 UIU/ML — SIGNIFICANT CHANGE UP (ref 0.35–4.94)
UROBILINOGEN FLD QL: 0.2 E.U./DL — SIGNIFICANT CHANGE UP
WBC # BLD: 9.2 K/UL — SIGNIFICANT CHANGE UP (ref 3.8–10.5)
WBC # BLD: 9.87 K/UL — SIGNIFICANT CHANGE UP (ref 3.8–10.5)
WBC # FLD AUTO: 9.2 K/UL — SIGNIFICANT CHANGE UP (ref 3.8–10.5)
WBC # FLD AUTO: 9.87 K/UL — SIGNIFICANT CHANGE UP (ref 3.8–10.5)

## 2021-03-15 PROCEDURE — 99223 1ST HOSP IP/OBS HIGH 75: CPT

## 2021-03-15 PROCEDURE — 93010 ELECTROCARDIOGRAM REPORT: CPT | Mod: 76

## 2021-03-15 PROCEDURE — 71045 X-RAY EXAM CHEST 1 VIEW: CPT | Mod: 26

## 2021-03-15 PROCEDURE — 93010 ELECTROCARDIOGRAM REPORT: CPT

## 2021-03-15 PROCEDURE — 93010 ELECTROCARDIOGRAM REPORT: CPT | Mod: 77

## 2021-03-15 PROCEDURE — 93306 TTE W/DOPPLER COMPLETE: CPT | Mod: 26

## 2021-03-15 PROCEDURE — 99285 EMERGENCY DEPT VISIT HI MDM: CPT

## 2021-03-15 RX ORDER — VENLAFAXINE HCL 75 MG
150 CAPSULE, EXT RELEASE 24 HR ORAL DAILY
Refills: 0 | Status: DISCONTINUED | OUTPATIENT
Start: 2021-03-16 | End: 2021-03-19

## 2021-03-15 RX ORDER — POTASSIUM CHLORIDE 20 MEQ
40 PACKET (EA) ORAL ONCE
Refills: 0 | Status: DISCONTINUED | OUTPATIENT
Start: 2021-03-15 | End: 2021-03-15

## 2021-03-15 RX ORDER — POTASSIUM CHLORIDE 20 MEQ
20 PACKET (EA) ORAL ONCE
Refills: 0 | Status: DISCONTINUED | OUTPATIENT
Start: 2021-03-15 | End: 2021-03-15

## 2021-03-15 RX ORDER — POTASSIUM CHLORIDE 20 MEQ
40 PACKET (EA) ORAL ONCE
Refills: 0 | Status: COMPLETED | OUTPATIENT
Start: 2021-03-15 | End: 2021-03-15

## 2021-03-15 RX ORDER — IBUPROFEN 200 MG
400 TABLET ORAL ONCE
Refills: 0 | Status: COMPLETED | OUTPATIENT
Start: 2021-03-15 | End: 2021-03-15

## 2021-03-15 RX ORDER — DIAZEPAM 5 MG
2 TABLET ORAL ONCE
Refills: 0 | Status: DISCONTINUED | OUTPATIENT
Start: 2021-03-15 | End: 2021-03-16

## 2021-03-15 RX ORDER — POTASSIUM CHLORIDE 20 MEQ
10 PACKET (EA) ORAL
Refills: 0 | Status: COMPLETED | OUTPATIENT
Start: 2021-03-15 | End: 2021-03-15

## 2021-03-15 RX ORDER — ENOXAPARIN SODIUM 100 MG/ML
40 INJECTION SUBCUTANEOUS EVERY 12 HOURS
Refills: 0 | Status: DISCONTINUED | OUTPATIENT
Start: 2021-03-15 | End: 2021-03-16

## 2021-03-15 RX ORDER — ONDANSETRON 8 MG/1
4 TABLET, FILM COATED ORAL ONCE
Refills: 0 | Status: COMPLETED | OUTPATIENT
Start: 2021-03-15 | End: 2021-03-15

## 2021-03-15 RX ORDER — INFLUENZA VIRUS VACCINE 15; 15; 15; 15 UG/.5ML; UG/.5ML; UG/.5ML; UG/.5ML
0.5 SUSPENSION INTRAMUSCULAR ONCE
Refills: 0 | Status: COMPLETED | OUTPATIENT
Start: 2021-03-15 | End: 2021-03-15

## 2021-03-15 RX ORDER — LANOLIN ALCOHOL/MO/W.PET/CERES
3 CREAM (GRAM) TOPICAL AT BEDTIME
Refills: 0 | Status: DISCONTINUED | OUTPATIENT
Start: 2021-03-15 | End: 2021-03-18

## 2021-03-15 RX ORDER — MAGNESIUM SULFATE 500 MG/ML
2 VIAL (ML) INJECTION ONCE
Refills: 0 | Status: COMPLETED | OUTPATIENT
Start: 2021-03-15 | End: 2021-03-15

## 2021-03-15 RX ORDER — VENLAFAXINE HCL 75 MG
150 CAPSULE, EXT RELEASE 24 HR ORAL DAILY
Refills: 0 | Status: DISCONTINUED | OUTPATIENT
Start: 2021-03-15 | End: 2021-03-15

## 2021-03-15 RX ORDER — POTASSIUM CHLORIDE 20 MEQ
20 PACKET (EA) ORAL ONCE
Refills: 0 | Status: COMPLETED | OUTPATIENT
Start: 2021-03-15 | End: 2021-03-15

## 2021-03-15 RX ORDER — POTASSIUM CHLORIDE 20 MEQ
10 PACKET (EA) ORAL ONCE
Refills: 0 | Status: COMPLETED | OUTPATIENT
Start: 2021-03-15 | End: 2021-03-15

## 2021-03-15 RX ORDER — BENZOCAINE AND MENTHOL 5; 1 G/100ML; G/100ML
1 LIQUID ORAL THREE TIMES A DAY
Refills: 0 | Status: DISCONTINUED | OUTPATIENT
Start: 2021-03-15 | End: 2021-03-20

## 2021-03-15 RX ORDER — POTASSIUM CHLORIDE 20 MEQ
40 PACKET (EA) ORAL EVERY 4 HOURS
Refills: 0 | Status: COMPLETED | OUTPATIENT
Start: 2021-03-15 | End: 2021-03-16

## 2021-03-15 RX ORDER — ASPIRIN/CALCIUM CARB/MAGNESIUM 324 MG
81 TABLET ORAL DAILY
Refills: 0 | Status: DISCONTINUED | OUTPATIENT
Start: 2021-03-15 | End: 2021-03-23

## 2021-03-15 RX ORDER — METOPROLOL TARTRATE 50 MG
25 TABLET ORAL DAILY
Refills: 0 | Status: DISCONTINUED | OUTPATIENT
Start: 2021-03-15 | End: 2021-03-23

## 2021-03-15 RX ORDER — ATORVASTATIN CALCIUM 80 MG/1
40 TABLET, FILM COATED ORAL AT BEDTIME
Refills: 0 | Status: DISCONTINUED | OUTPATIENT
Start: 2021-03-15 | End: 2021-03-23

## 2021-03-15 RX ORDER — POTASSIUM CHLORIDE 20 MEQ
10 PACKET (EA) ORAL
Refills: 0 | Status: DISCONTINUED | OUTPATIENT
Start: 2021-03-15 | End: 2021-03-15

## 2021-03-15 RX ADMIN — BENZOCAINE AND MENTHOL 1 LOZENGE: 5; 1 LIQUID ORAL at 23:21

## 2021-03-15 RX ADMIN — Medication 20 MILLIEQUIVALENT(S): at 21:18

## 2021-03-15 RX ADMIN — Medication 40 MILLIEQUIVALENT(S): at 21:17

## 2021-03-15 RX ADMIN — Medication 150 MILLIGRAM(S): at 17:43

## 2021-03-15 RX ADMIN — ONDANSETRON 4 MILLIGRAM(S): 8 TABLET, FILM COATED ORAL at 07:21

## 2021-03-15 RX ADMIN — BENZOCAINE AND MENTHOL 1 LOZENGE: 5; 1 LIQUID ORAL at 17:22

## 2021-03-15 RX ADMIN — Medication 400 MILLIGRAM(S): at 23:21

## 2021-03-15 RX ADMIN — Medication 40 MILLIEQUIVALENT(S): at 13:06

## 2021-03-15 RX ADMIN — Medication 50 GRAM(S): at 07:21

## 2021-03-15 RX ADMIN — ATORVASTATIN CALCIUM 40 MILLIGRAM(S): 80 TABLET, FILM COATED ORAL at 21:17

## 2021-03-15 RX ADMIN — Medication 40 MILLIEQUIVALENT(S): at 07:21

## 2021-03-15 RX ADMIN — Medication 25 MILLIGRAM(S): at 13:06

## 2021-03-15 RX ADMIN — Medication 100 MILLIGRAM(S): at 23:21

## 2021-03-15 RX ADMIN — ENOXAPARIN SODIUM 40 MILLIGRAM(S): 100 INJECTION SUBCUTANEOUS at 17:23

## 2021-03-15 RX ADMIN — Medication 100 MILLIEQUIVALENT(S): at 07:21

## 2021-03-15 RX ADMIN — Medication 81 MILLIGRAM(S): at 13:06

## 2021-03-15 RX ADMIN — Medication 40 MILLIEQUIVALENT(S): at 18:14

## 2021-03-15 RX ADMIN — Medication 40 MILLIEQUIVALENT(S): at 14:16

## 2021-03-15 NOTE — H&P ADULT - PROBLEM SELECTOR PLAN 5
- Hx of CAD, received stent to LAD in 2001 at [facility]   - Continue daily Aspirin 81 mg once daily, Atorva 40 mg once daily, and Metoprolol XL 50 mg once daily - Hx of CAD, received stent to LAD in 2001 at [facility]   - Continue daily Aspirin 81 mg once daily, Atorva 40 mg once daily, and Metoprolol XL 25 mg once daily - Hx of CAD, received stent to LAD in 2001 at [facility]   - Continue daily Aspirin 81 mg once daily, Atorva 40 mg once daily, and Metoprolol XL 25 mg once daily

## 2021-03-15 NOTE — CONSULT NOTE ADULT - ATTENDING COMMENTS
sig hypokalemia (and hyponatremia and MARSHA) due to diuretics for LE edema-- seems due to venous insufficiency but r/o other causes  LE dopplers, liver sono if not done   cont hold diuretics, replete K  might benefit from aldactone

## 2021-03-15 NOTE — ED PROVIDER NOTE - ATTENDING CONTRIBUTION TO CARE
70 year old female with h/o FA, MI one stent (2008), on metoprolol, atorvastatin, ASA, Losartan, presents with weakness, heart racing x 18 hours. found to be in afib c rvr by ems, given 20 mg cardizem by paramedics, pt arrives in nsr.    potassium 2.0, k runs ordered.  nsr entire ED stay, discussed with cardiology oncall, will admit [facility] cardiac tele.

## 2021-03-15 NOTE — H&P ADULT - PROBLEM SELECTOR PLAN 1
- On admit, patient with 2+ B/L LE edema, unable to lay flat,   - CXR with mild pulmonary vascular congestion, satting 98% on RA   - EKG: NSR @ 83 bpm, mild ST depressions in leads V4-V6  - Troponin elevated to 0.05, continue to trend   - Follows with Dr. Arnold whom said patient had an echo back in 12/2020 with EF of 60% and trace MR  - Obtain repeat echo   - Home meds include Metolazone 5 mg once daily, Furosemide 80 mg once daily, Toprol XL 50 mg once daily, and Losartan 50 mg once daily  - Holding:   - Continue:   - Core measures, 1.5L fluid restriction, daily weight, strict I's and O's - On admit, patient with 2+ B/L LE edema, unable to lay flat,   - CXR with mild pulmonary vascular congestion, satting 98% on RA   - EKG: NSR @ 83 bpm, mild ST depressions in leads V4-V6  - Troponin elevated to 0.05, continue to trend   - Follows with Dr. Arnold whom said patient had an echo back in 12/2020 with EF of 60% and trace MR  - Obtain repeat echo   - Home meds include Metolazone 5 mg once daily, Furosemide 80 mg once daily, Toprol XL 50 mg once daily, and Losartan 50 mg once daily  - Holding home diuretics and losartan for now; given elevated Cr and hypokalemia   - Holding diuretic until K repleted;   - Core measures, 1.5L fluid restriction, daily weight, strict I's and O's - On admit, patient with 2+ B/L LE edema, unable to lay flat, sleeps on recliner at home  - CXR with mild pulmonary vascular congestion, satting 98% on RA   - EKG: NSR @ 83 bpm, mild ST depressions in leads V4-V6  - Troponin elevated to 0.05 x2, continue to trend to peak  - Follows with Dr. Arnold whom said patient had an echo back in 12/2020 with EF of 60% and trace MR  - Obtain repeat echo   - Home meds include Metolazone 5 mg once daily, Furosemide 80 mg once daily, Toprol XL 50 mg once daily, and Losartan 50 mg once daily  - Holding home diuretics and losartan for now; given elevated Cr and hypokalemia   - Holding diuretic until K repleted;   - B/L LE Doppler ordered  - Core measures, 1.5L fluid restriction, daily weight, strict I's and O's

## 2021-03-15 NOTE — H&P ADULT - PROBLEM SELECTOR PLAN 6
- SBPs 120s on arrival   - Home meds include Toprol XL 50 mg, Lasix 80 mg once daily, and Losartan 50 mg once daily  - Holding:   - Continue: - SBPs 120s on arrival   - Home meds include Toprol XL 50 mg, Lasix 80 mg once daily, and Losartan 50 mg once daily  - Holding: Losartan and diuretics  - Continue: Toprol XL 25 mg once daily - BUN/Cr elevated to 71/1.51 on admission  - CR improving to 1.11 with appropriate UOP   - F/u urine lytes   - f/u bladder scan with PVR to r/o retentio

## 2021-03-15 NOTE — H&P ADULT - PROBLEM SELECTOR PLAN 3
- Patient with 6/10 chest pressure and tightness on arrival to [facility], now resolved   - Troponin T 0.05, EKG with ST depressions in V4-V6  - Trend Troponin with serial EKGs - Patient with 6/10 chest pressure and tightness on arrival to [facility], now resolved   - Troponin T 0.05 x2, EKG with ST depressions in V4-V6  - Trend Troponin with serial EKGs

## 2021-03-15 NOTE — H&P ADULT - PROBLEM SELECTOR PLAN 7
- Home meds: Atorvastatin 40 mg once daily   - F/u fasting lipid panel in AM    F: none  E: K> 4, Mg > 2  N: DASH/TLC, fluid restriction <1.5L  Dvt: Heparin   Dispo: pending clinical progression     Case discussed with Dr. Black - SBPs 120s on arrival   - Home meds include Toprol XL 50 mg, Lasix 80 mg once daily, and Losartan 50 mg once daily  - Holding: Losartan and diuretics  - Continue: Toprol XL 25 mg once daily

## 2021-03-15 NOTE — ED ADULT NURSE NOTE - NSIMPLEMENTINTERV_GEN_ALL_ED
Ambulatory
Implemented All Fall Risk Interventions:  [place] to call system. Call bell, personal items and telephone within reach. Instruct patient to call for assistance. Room bathroom lighting operational. Non-slip footwear when patient is off stretcher. Physically safe environment: no spills, clutter or unnecessary equipment. Stretcher in lowest position, wheels locked, appropriate side rails in place. Provide visual cue, wrist band, yellow gown, etc. Monitor gait and stability. Monitor for mental status changes and reorient to person, place, and time. Review medications for side effects contributing to fall risk. Reinforce activity limits and safety measures with patient and family.

## 2021-03-15 NOTE — H&P ADULT - PROBLEM SELECTOR PLAN 9
- Home meds: Atorvastatin 40 mg once daily   - F/u fasting lipid panel in AM    F: none  E: K> 4, Mg > 2  N: DASH/TLC, fluid restriction <1.5L  Dvt: Heparin   Dispo: pending clinical progression     Case discussed with Dr. Black

## 2021-03-15 NOTE — H&P ADULT - NSHPSOCIALHISTORY_GEN_ALL_CORE
ETOH:   Smoking:   Illicit Drugs: ETOH: Frequent 1-2 drinks/day  Smokin ppd x >20 years  Illicit Drugs: Denies

## 2021-03-15 NOTE — H&P ADULT - NSICDXPASTMEDICALHX_GEN_ALL_CORE_FT
PAST MEDICAL HISTORY:  Myocardial infarction      PAST MEDICAL HISTORY:  High cholesterol     HTN (hypertension)     Myocardial infarction 2001 at [facility] - reports LAD

## 2021-03-15 NOTE — ED ADULT NURSE NOTE - OBJECTIVE STATEMENT
Pt states "I had this chest pressure last night and my heart was racing, the ambulance people took care of me and brought me here". Pt added "Im a little tired now and would like to get some rest.

## 2021-03-15 NOTE — ED PROVIDER NOTE - OBJECTIVE STATEMENT
70 year old female with h/o FA, MI one stent (2008), on metoprolol, atorvastatin, ASA, Losartan, presents with weakness, heart racing x 18 hours.   Pt brought in by EMS stating that she started to have chest pressure at around 10 pm. Pt called 911 this morning, additionally complaining of anxiety and shortness of breath. Per EMS, pt with afib with RVR, given 20mg IV push Cardizem with positive response. on arrival HR controlled in 80s. patient with continued intermittent mild chest pressure. reports no h/o afib in the past.   Denies fever, chills, diaphoresis, MORRIS, SOB, orthopnea, cough, hemoptysis, wheezing, peripheral edema, focal weakness, numbness, tingling, paresthesia, HA, dizziness, neck pain, N/V/D/C, abdominal pain, change in urinary/bowel function, trauma, fall, rash, and malaise.

## 2021-03-15 NOTE — ED PROVIDER NOTE - CLINICAL SUMMARY MEDICAL DECISION MAKING FREE TEXT BOX
h/o MI with stent in 2008, presents with afib prior to arrival, converted after cardizem 20mg IV.  labs notably for hypokalemia, troponin leak. patient with chest pressure. will require admission for further work up with cardiology. will replete K/Mg.

## 2021-03-15 NOTE — H&P ADULT - PROBLEM SELECTOR PLAN 4
Patient with K of 2.0 at [facility], received IV KCL 10 mEq but only received 1/2 dose due to burning at site. Patient also received additional 40 mEq PO x 1.   - Mg 1.8 and given Mg 2g IV at [facility]  - K on arrival to [facility] 2.4. Patient given additional KCL 40 mEq PO x 1, will f/u BMP at 4 PM and give additional KCL 40 mEq at that time   - Continue to monitor for EKG changes    - Patient with K of 2.0 at [facility], received IV KCL 10 mEq but only received 1/2 dose due to burning at site. Patient also received additional 40 mEq PO x 2.   - Started IV 10 mEq x 4 hours   - Mg 1.8 and given Mg 2g IV at [facility]  - F/u repeat BNP and K at 4 pm   - Continue to monitor for EKG changes Patient with K of 2.0 at [facility], received IV KCL 10 mEq but only received 1/2 dose due to burning at site and Kdur 40 mEq x2. K on arrival to [facility] 2.4. Ordered for Kdur 40 mEq PO x 2, Kdur 10 IVP x 1, repeat K with Mg at 4 pm    - Mg 1.8 and given Mg 2g IV at [facility]  - Continue to monitor for EKG changes Patient with K of 2.0 at [facility], received IV KCL 10 mEq but only received 1/2 dose due to burning at site and Kdur 40 mEq x2. K on arrival to [facility] 2.4. Ordered for Kdur 40 mEq PO x 2, Kdur 10 IVP x 1, repeat K with Mg at 4 pm    - Mg 1.8 and given Mg 2g IV at [facility]  - Continue to monitor for EKG changes  - Renal consulted - rec to replete K aggressively and f/u urine lytes

## 2021-03-15 NOTE — H&P ADULT - ASSESSMENT
Pt is a 71 y/o F, current smoker (1 ppd x >30 years), [employer], with PMHx of CAD (s/p MI with PCI in 2001 - LAD? at [facility]), chronic diastolic CHF (EF > 60% as per echo 12/2020), HTN, HLD, fibromyalgia, presented to [facility] by EMS on 3/15/2021 endorsing feeling like her "heart was racing" and weakness for the past 18 hours. Patient called 911 earlier this morning, due to chest pain, anxiety, and shortness of breath. Per EMS, patient in AFIB with RVR (rates to:150s - however strip not present on arrival to [facility]). Patient received Cardizem 20 mg IVP x 1 and converted to NSR, rates 80s. At [facility], patient found to be hypokalemic (K:2.0). Patient transferred to [facility] for further management afib with RVR and hypokalemia, subsequently found to be in acute diastolic CHF.      Pt is a 71 y/o F, current smoker (1 ppd x >30 years), [employer] H[employer]IAN, with PMHx of CAD (s/p MI with PCI in 2001 - LAD? at [facility]), chronic diastolic CHF (EF > 60% as per echo 12/2020), HTN, HLD, fibromyalgia, presented to [facility] by EMS on 3/15/2021 endorsing feeling like her "heart was racing" and weakness for the past 18 hours. Patient called 911 earlier this morning, due to chest pain, anxiety, and shortness of breath. Per EMS, patient in AFIB with RVR (rates to:150s - however strip not present on arrival to [facility]). Patient received Cardizem 20 mg IVP x 1 and converted to NSR, rates 80s on arrival to [facility]. Patient also found to be hypokalemic (K:2.0). Patient transferred to [facility] for further management afib with RVR and hypokalemia, subsequently found to be in acute diastolic CHF.

## 2021-03-15 NOTE — ED PROVIDER NOTE - CARE PLAN
Principal Discharge DX:	Hypokalemia  Secondary Diagnosis:	Chest pressure  Secondary Diagnosis:	Atrial fibrillation

## 2021-03-15 NOTE — CONSULT NOTE ADULT - SUBJECTIVE AND OBJECTIVE BOX
HPI:  69 y/o F, current smoker (1 ppd x >30 years), POOR HISTORIAN, with PMHx of CAD (s/p MI with PCI in  - LAD? at [facility]), chronic diastolic CHF (EF > 60% as per echo 2020), HTN, HLD, fibromyalgia, presented to [facility] by EMS on 3/15/2021 endorsing feeling like her "heart was racing" and weakness for the past 18 hours. Patient called 911 earlier this morning, due to chest pain, anxiety, and shortness of breath. Per EMS, patient in AFIB with RVR (rates to:150s - however strip not present on arrival to [facility]). Patient received Cardizem 20 mg IVP x 1 in route to ED, HR on arrival to [facility], patient in NSR with HR to 80s. Patient denies hx of afib in the past, but does state she was told she had had an arrythmia in which she was told to massage her carotid for - in which she was, but continued to have palpitations this time. Patient reports intermittent mild 6/10 chest pressure. Patient also states that she has been having a hard time throughout the pandemic, reports to >100 lb weight gain over the past year and has been inactive due to hip replacement 3 years ago. Patient reports feeling depressed and has been binge eating and drinking more alcohol than usual. Patient also stating that she has been prescribed a diuretic from cardiologist, Dr. Ireland, but has not been using them as she does not "want to be dependent." Patient denies fever, chills, diaphoresis, MORRIS, orthopnea, cough, hemoptysis, peripheral edema, focal weakness, numbness, tingling, paresthesia, HA, dizziness, neck pain, N/V/D/C, recent fall, recent travel, or sick contacts.    ROS:  Otherwise negative, except as specified in HPI.    PMH:  CAD  HFpEF  HTN  HLD  Fibromyalgia    PSH:    FH:    SH:  Current smoker    ALLERGIES:    MEDICATIONS:    VITAL SIGNS:  ICU Vital Signs Last 24 Hrs  T(C): 36.9 (15 Mar 2021 13:08), Max: 36.9 (15 Mar 2021 08:26)  T(F): 98.5 (15 Mar 2021 13:08), Max: 98.5 (15 Mar 2021 13:08)  HR: 86 (15 Mar 2021 13:04) (72 - 92)  BP: 114/60 (15 Mar 2021 13:04) (114/60 - 124/76)  BP(mean): 81 (15 Mar 2021 13:04) (81 - 87)  ABP: --  ABP(mean): --  RR: 19 (15 Mar 2021 13:04) (16 - 19)  SpO2: 93% (15 Mar 2021 13:04) (91% - 98%)    CAPILLARY BLOOD GLUCOSE          PHYSICAL EXAM:  Constitutional: resting comfortably in bed, NAD  HEENT: NC/AT; PERRL, anicteric sclera; no oropharyngeal erythema or exudates; MMM  Neck: supple, no appreciable JVD  Respiratory: CTA B/L, no W/R/R; respirations appear non-labored, conversive in full sentences  Cardiovascular: +S1/S2, RRR  Gastrointestinal: abdomen soft, NT/ND  Extremities: WWP; no clubbing, cyanosis or edema  Vascular: 2+ radial, femoral, and DP/PT pulses B/L  Dermatologic: skin normal color and turgor; no visible rashes  Neurological:     LABS:                        14.8   9.20  )-----------( 289      ( 15 Mar 2021 10:41 )             43.0     03-15    129<L>  |  77<L>  |  60<H>  ----------------------------<  122<H>  2.4<LL>   |  39<H>  |  1.27    Ca    9.7      15 Mar 2021 10:41  Mg     2.9     03-15    TPro  7.6  /  Alb  4.5  /  TBili  0.5  /  DBili  x   /  AST  31  /  ALT  33  /  AlkPhos  108  03-15    PT/INR - ( 15 Mar 2021 06:36 )   PT: 11.2 sec;   INR: 0.95          PTT - ( 15 Mar 2021 06:36 )  PTT:25.2 sec    CARDIAC MARKERS ( 15 Mar 2021 10:41 )  x     / 0.05 ng/mL / 277 U/L / x     / 5.9 ng/mL  CARDIAC MARKERS ( 15 Mar 2021 06:36 )  0.161 ng/mL / x     / 270 U/L / x     / x          Urinalysis Basic - ( 15 Mar 2021 07:58 )    Color: Yellow / Appearance: Clear / S.010 / pH: x  Gluc: x / Ketone: NEGATIVE  / Bili: NEGATIVE / Urobili: 0.2 E.U./dL   Blood: x / Protein: NEGATIVE mg/dL / Nitrite: NEGATIVE   Leuk Esterase: NEGATIVE / RBC: x / WBC x   Sq Epi: x / Non Sq Epi: x / Bacteria: x          EKG: Reviewed.    RADIOLOGY & ADDITIONAL TESTS: Reviewed. HPI:  69 y/o F, current smoker (1 ppd x >30 years), POOR HISTORIAN, with PMHx of CAD (s/p MI with PCI in  - LAD? at [facility]), chronic diastolic CHF (EF > 60% as per echo 2020), HTN, HLD, fibromyalgia, presented to [facility] by EMS on 3/15/2021 endorsing feeling like her "heart was racing" and weakness for the past 18 hours. Patient called 911 earlier this morning, due to chest pain, anxiety, and shortness of breath. Per EMS, patient in AFIB with RVR (rates to:150s - however strip not present on arrival to [facility]). Patient received Cardizem 20 mg IVP x 1 in route to ED, HR on arrival to [facility], patient in NSR with HR to 80s. Patient denies hx of afib in the past, but does state she was told she had had an arrythmia in which she was told to massage her carotid for - in which she was, but continued to have palpitations this time. Patient reports intermittent mild 6/10 chest pressure. Patient also states that she has been having a hard time throughout the pandemic, reports to >100 lb weight gain over the past year and has been inactive due to hip replacement 3 years ago. Patient reports feeling depressed and has been binge eating and drinking more alcohol than usual. Patient also stating that she has been prescribed a diuretic from cardiologist, Dr. Ireland, but has not been using them as she does not "want to be dependent." Patient denies fever, chills, diaphoresis, MORRIS, orthopnea, cough, hemoptysis, peripheral edema, focal weakness, numbness, tingling, paresthesia, HA, dizziness, neck pain, N/V/D/C, recent fall, recent travel, or sick contacts.    ROS:  Otherwise negative, except as specified in HPI.    PMH:  CAD  HFpEF  HTN  HLD  Fibromyalgia    PSH:    FH:    SH:  Current smoker    ALLERGIES:    MEDICATIONS:    VITAL SIGNS:  ICU Vital Signs Last 24 Hrs  T(C): 36.9 (15 Mar 2021 13:08), Max: 36.9 (15 Mar 2021 08:26)  T(F): 98.5 (15 Mar 2021 13:08), Max: 98.5 (15 Mar 2021 13:08)  HR: 86 (15 Mar 2021 13:04) (72 - 92)  BP: 114/60 (15 Mar 2021 13:04) (114/60 - 124/76)  BP(mean): 81 (15 Mar 2021 13:04) (81 - 87)  ABP: --  ABP(mean): --  RR: 19 (15 Mar 2021 13:04) (16 - 19)  SpO2: 93% (15 Mar 2021 13:04) (91% - 98%)    CAPILLARY BLOOD GLUCOSE          PHYSICAL EXAM:  Constitutional: Elderly  female, resting comfortably in bed, NAD  HEENT: NC/AT; PERRL, anicteric sclera; no oropharyngeal erythema or exudates; MMM  Neck: supple, no appreciable JVD  Respiratory: CTA B/L, no W/R/R; respirations appear non-labored, conversive in full sentences  Cardiovascular: +S1/S2, RRR  Gastrointestinal: abdomen soft, NT/ND  Extremities: 2+ pitting edema in LE B/L. WWP; no clubbing, cyanosis or edema  Vascular: 2+ radial, femoral, and DP/PT pulses B/L  Dermatologic: skin normal color and turgor; no visible rashes  Neurological: AOX3    LABS:                        14.8   9.20  )-----------( 289      ( 15 Mar 2021 10:41 )             43.0     03-15    129<L>  |  77<L>  |  60<H>  ----------------------------<  122<H>  2.4<LL>   |  39<H>  |  1.27    Ca    9.7      15 Mar 2021 10:41  Mg     2.9     03-15    TPro  7.6  /  Alb  4.5  /  TBili  0.5  /  DBili  x   /  AST  31  /  ALT  33  /  AlkPhos  108  03-15    PT/INR - ( 15 Mar 2021 06:36 )   PT: 11.2 sec;   INR: 0.95          PTT - ( 15 Mar 2021 06:36 )  PTT:25.2 sec    CARDIAC MARKERS ( 15 Mar 2021 10:41 )  x     / 0.05 ng/mL / 277 U/L / x     / 5.9 ng/mL  CARDIAC MARKERS ( 15 Mar 2021 06:36 )  0.161 ng/mL / x     / 270 U/L / x     / x          Urinalysis Basic - ( 15 Mar 2021 07:58 )    Color: Yellow / Appearance: Clear / S.010 / pH: x  Gluc: x / Ketone: NEGATIVE  / Bili: NEGATIVE / Urobili: 0.2 E.U./dL   Blood: x / Protein: NEGATIVE mg/dL / Nitrite: NEGATIVE   Leuk Esterase: NEGATIVE / RBC: x / WBC x   Sq Epi: x / Non Sq Epi: x / Bacteria: x          EKG: Reviewed.    RADIOLOGY & ADDITIONAL TESTS: Reviewed. HPI:  71 y/o F, current smoker (1 ppd x >30 years), POOR HISTORIAN, with PMHx of CAD (s/p MI with PCI in  - LAD? at [facility]), chronic diastolic CHF (EF > 60% as per echo 2020), HTN, HLD, fibromyalgia, presented to [facility] by EMS on 3/15/2021 endorsing feeling like her "heart was racing" and weakness for the past 18 hours. Patient called 911 earlier this morning, due to chest pain, anxiety, and shortness of breath. Per EMS, patient in AFIB with RVR (rates to:150s - however strip not present on arrival to [facility]). Patient received Cardizem 20 mg IVP x 1 in route to ED, HR on arrival to [facility], patient in NSR with HR to 80s. Patient denies hx of afib in the past, but does state she was told she had had an arrythmia in which she was told to massage her carotid for - in which she was, but continued to have palpitations this time. Patient reports intermittent mild 6/10 chest pressure. Patient also states that she has been having a hard time throughout the pandemic, reports to >100 lb weight gain over the past year and has been inactive due to hip replacement 3 years ago. Patient reports feeling depressed and has been binge eating and drinking more alcohol than usual. Patient also stating that she has been prescribed a diuretic from cardiologist, Dr. Ireland, but has not been using them as she does not "want to be dependent." Patient denies fever, chills, diaphoresis, MORRIS, orthopnea, cough, hemoptysis, peripheral edema, focal weakness, numbness, tingling, paresthesia, HA, dizziness, neck pain, N/V/D/C, recent fall, recent travel, or sick contacts.  has been taking lasix 80 mg and metolazone 5 mg for several days at least prior to admit -- for LE edema  k 2.0 on admit -- then 2.4 -- now 3.1, -- post 120 meq KCL       ROS:  Otherwise negative, except as specified in HPI.    PMH:  CAD  HFpEF  HTN  HLD  Fibromyalgia    PSH:    FH:    SH:  Current smoker    ALLERGIES:    MEDICATIONS:    VITAL SIGNS:  ICU Vital Signs Last 24 Hrs  T(C): 36.9 (15 Mar 2021 13:08), Max: 36.9 (15 Mar 2021 08:26)  T(F): 98.5 (15 Mar 2021 13:08), Max: 98.5 (15 Mar 2021 13:08)  HR: 86 (15 Mar 2021 13:04) (72 - 92)  BP: 114/60 (15 Mar 2021 13:04) (114/60 - 124/76)  BP(mean): 81 (15 Mar 2021 13:04) (81 - 87)  ABP: --  ABP(mean): --  RR: 19 (15 Mar 2021 13:04) (16 - 19)  SpO2: 93% (15 Mar 2021 13:04) (91% - 98%)    CAPILLARY BLOOD GLUCOSE          PHYSICAL EXAM:  Constitutional: Elderly  female, resting comfortably in bed, NAD  HEENT: NC/AT; PERRL, anicteric sclera; no oropharyngeal erythema or exudates; MMM  Neck: supple, no appreciable JVD  Respiratory: CTA B/L, no W/R/R; respirations appear non-labored, conversive in full sentences  Cardiovascular: +S1/S2, RRR  Gastrointestinal: abdomen soft, NT/ND  Extremities: 2+ pitting edema in LE B/L. WWP; no clubbing, cyanosis or edema  Vascular: 2+ radial, femoral, and DP/PT pulses B/L  Dermatologic: skin normal color and turgor; no visible rashes  Neurological: AOX3    LABS:                        14.8   9.20  )-----------( 289      ( 15 Mar 2021 10:41 )             43.0     03-15    129<L>  |  77<L>  |  60<H>  ----------------------------<  122<H>  2.4<LL>   |  39<H>  |  1.27    Ca    9.7      15 Mar 2021 10:41  Mg     2.9     03-15    TPro  7.6  /  Alb  4.5  /  TBili  0.5  /  DBili  x   /  AST  31  /  ALT  33  /  AlkPhos  108  03-15    PT/INR - ( 15 Mar 2021 06:36 )   PT: 11.2 sec;   INR: 0.95          PTT - ( 15 Mar 2021 06:36 )  PTT:25.2 sec    CARDIAC MARKERS ( 15 Mar 2021 10:41 )  x     / 0.05 ng/mL / 277 U/L / x     / 5.9 ng/mL  CARDIAC MARKERS ( 15 Mar 2021 06:36 )  0.161 ng/mL / x     / 270 U/L / x     / x          Urinalysis Basic - ( 15 Mar 2021 07:58 )    Color: Yellow / Appearance: Clear / S.010 / pH: x  Gluc: x / Ketone: NEGATIVE  / Bili: NEGATIVE / Urobili: 0.2 E.U./dL   Blood: x / Protein: NEGATIVE mg/dL / Nitrite: NEGATIVE   Leuk Esterase: NEGATIVE / RBC: x / WBC x   Sq Epi: x / Non Sq Epi: x / Bacteria: x          EKG: Reviewed.    RADIOLOGY & ADDITIONAL TESTS: Reviewed. HPI:  71 y/o F, current smoker (1 ppd x >30 years), POOR HISTORIAN, with PMHx of CAD (s/p MI with PCI in  - LAD? at [facility]), chronic diastolic CHF (EF > 60% as per echo 2020), HTN, HLD, fibromyalgia, presented to [facility] by EMS on 3/15/2021 endorsing feeling like her "heart was racing" and weakness for the past 18 hours. Patient called 911 earlier this morning, due to chest pain, anxiety, and shortness of breath. Per EMS, patient in AFIB with RVR (rates to:150s - however strip not present on arrival to [facility]). Patient received Cardizem 20 mg IVP x 1 in route to ED, HR on arrival to [facility], patient in NSR with HR to 80s. Patient denies hx of afib in the past, but does state she was told she had had an arrythmia in which she was told to massage her carotid for - in which she was, but continued to have palpitations this time. Patient reports intermittent mild 6/10 chest pressure. Patient also states that she has been having a hard time throughout the pandemic, reports to >100 lb weight gain over the past year and has been inactive due to hip replacement 3 years ago. Patient reports feeling depressed and has been binge eating and drinking more alcohol than usual. Patient also stating that she has been prescribed a diuretic from cardiologist, Dr. Ireland, but has not been using them as she does not "want to be dependent." Patient denies fever, chills, diaphoresis, MORRIS, orthopnea, cough, hemoptysis, peripheral edema, focal weakness, numbness, tingling, paresthesia, HA, dizziness, neck pain, N/V/D/C, recent fall, recent travel, or sick contacts.  has been taking lasix 80 mg and metolazone 5 mg for several days at least prior to admit -- for LE edema  k 2.0 on admit -- then 2.4 -- now 3.1, -- post 120 meq KCL       ROS:  Otherwise negative, except as specified in HPI.    PMH:  CAD  HFpEF  HTN  HLD  Fibromyalgia    PSH:    FH:    SH:  Current smoker, no drugs, drinks etoh     ALLERGIES: NKDA    MEDICATIONS:    VITAL SIGNS:  ICU Vital Signs Last 24 Hrs  T(C): 36.9 (15 Mar 2021 13:08), Max: 36.9 (15 Mar 2021 08:26)  T(F): 98.5 (15 Mar 2021 13:08), Max: 98.5 (15 Mar 2021 13:08)  HR: 86 (15 Mar 2021 13:04) (72 - 92)  BP: 114/60 (15 Mar 2021 13:04) (114/60 - 124/76)  BP(mean): 81 (15 Mar 2021 13:04) (81 - 87)  ABP: --  ABP(mean): --  RR: 19 (15 Mar 2021 13:04) (16 - 19)  SpO2: 93% (15 Mar 2021 13:04) (91% - 98%)    CAPILLARY BLOOD GLUCOSE          PHYSICAL EXAM:  Constitutional: Elderly  female, resting comfortably in bed, NAD  HEENT: NC/AT; PERRL, anicteric sclera; no oropharyngeal erythema or exudates; MMM  Neck: supple, no appreciable JVD  Respiratory: CTA B/L, no W/R/R; respirations appear non-labored, conversive in full sentences  Cardiovascular: +S1/S2, RRR  Gastrointestinal: abdomen soft, NT/ND  Extremities: 2+ pitting edema in LE B/L. WWP; no clubbing, cyanosis or edema  Vascular: 2+ radial, femoral, and DP/PT pulses B/L  Dermatologic: skin normal color and turgor; no visible rashes  Neurological: AOX3    LABS:                        14.8   9.20  )-----------( 289      ( 15 Mar 2021 10:41 )             43.0     03-15    129<L>  |  77<L>  |  60<H>  ----------------------------<  122<H>  2.4<LL>   |  39<H>  |  1.27    Ca    9.7      15 Mar 2021 10:41  Mg     2.9     03-15    TPro  7.6  /  Alb  4.5  /  TBili  0.5  /  DBili  x   /  AST  31  /  ALT  33  /  AlkPhos  108  03-15    PT/INR - ( 15 Mar 2021 06:36 )   PT: 11.2 sec;   INR: 0.95          PTT - ( 15 Mar 2021 06:36 )  PTT:25.2 sec    CARDIAC MARKERS ( 15 Mar 2021 10:41 )  x     / 0.05 ng/mL / 277 U/L / x     / 5.9 ng/mL  CARDIAC MARKERS ( 15 Mar 2021 06:36 )  0.161 ng/mL / x     / 270 U/L / x     / x          Urinalysis Basic - ( 15 Mar 2021 07:58 )    Color: Yellow / Appearance: Clear / S.010 / pH: x  Gluc: x / Ketone: NEGATIVE  / Bili: NEGATIVE / Urobili: 0.2 E.U./dL   Blood: x / Protein: NEGATIVE mg/dL / Nitrite: NEGATIVE   Leuk Esterase: NEGATIVE / RBC: x / WBC x   Sq Epi: x / Non Sq Epi: x / Bacteria: x          EKG: Reviewed.    RADIOLOGY & ADDITIONAL TESTS: Reviewed.

## 2021-03-15 NOTE — H&P ADULT - HISTORY OF PRESENT ILLNESS
incomplete   Pt is a 71 y/o F with PMHx of CAD (s/p PCI 2008 at      ), HTN, HLD, fibromyalgia, presented to [facility] by EMS on 3/15/2021 endorsing feeling like her "heart was racing" and weakness for the past 18 hours. Patient called 911 earlier this morning, due to chest pain, anxiety, and shortness of breath. Per EMS, patient in AFIB with RVR (rates to:   ). Patient received Cardizem 20 mg IVP x 1 in route to ED, HR on arrival controlled to 80s. Patient denies hx of afib in the past. Patient reports intermittent mild chest pressure.   Patient denies fever, chills, diaphoresis, MORRIS, orthopnea, cough, hemoptysis, peripheral edema, focal weakness, numbness, tingling, paresthesia, HA, dizziness, neck pain, N/V/D/C, recent fall, recent travel, or sick contacts.  In [facility], VS: T: 98.2   BP:124/69 mmHg   HR:88  spO2: 98% on RA   RR: 16  Labs significant for: Sodium: 131, Potassium: 2.0, Ma.8, BUN/Cr: 71/1.51, Troponin I: 0.161, Creatinine Kinase: 270, CFR: 35.   EKG: NSR @ 82 bpm, no ST-T wave changes?   COVID-PCR: negative. At [facility], patient received KCL 10 meq IV x 1, KCL 40 mEq PO x 1, Magnesium 2 mg IVP x 1, and Ondansterone 4 mg IVP x 1.   On arrival to [facility] ED, patient endorsing:       HR:    Patient admitted to telemetry/cardiology for further management of new onset afib with RVR.    Pt is a 71 y/o F, current smoker (1 ppd x >30 years), POOR HISTORIAN, with PMHx of CAD (s/p MI with PCI in  - LAD? at [facility]), chronic diastolic CHF (EF > 60% as per echo 2020), HTN, HLD, fibromyalgia, presented to [facility] by EMS on 3/15/2021 endorsing feeling like her "heart was racing" and weakness for the past 18 hours. Patient called 911 earlier this morning, due to chest pain, anxiety, and shortness of breath. Per EMS, patient in AFIB with RVR (rates to:150s - however strip not present on arrival to [facility]). Patient received Cardizem 20 mg IVP x 1 in route to ED, HR on arrival controlled to 80s. Patient denies hx of afib in the past, but does state she was told she had some sort of arrythmia in which she was told to massage her carotid for - in which she was, but continued to have palpitations this time. Patient reports intermittent mild 6/10 chest pressure. Patient also states that she has been having a hard time throughout the pandemic, reports to >100 lb weight gain over the past year and has been inactive due to hip replacement 3 years ago. Patient reports feeling depressed and has been binge eating and drinking more alcohol than usual. Patient also stating that she has been prescribed a diuretic from cardiologist, Dr. Ireland, but has not been using them as she does not "want to be dependent." Patient denies fever, chills, diaphoresis, MORRIS, orthopnea, cough, hemoptysis, peripheral edema, focal weakness, numbness, tingling, paresthesia, HA, dizziness, neck pain, N/V/D/C, recent fall, recent travel, or sick contacts.  In [facility], VS: T: 98.2   BP:124/69 mmHg   HR:88  spO2: 98% on RA   RR: 16  Labs significant for: Sodium: 131, Potassium: 2.0, Ma.8, BUN/Cr: 71/1.51, Troponin I: 0.161, Creatinine Kinase: 270, CFR: 35. EKG: NSR @ 82 bpm. CXR: Cardiomegaly, no acute opacity.   COVID-PCR: negative. At [facility], patient received KCL 10 meq IV x 1, KCL 40 mEq PO x 1, Magnesium 2 mg IVP x 1, and Ondansterone 4 mg IVP x 1.  However, patient only tolerated 1/2 IV potassium due to burning at site.   On arrival to [facility], patient CP free. VSS. Pt found to be overloaded with 2+ pitting LE edema. Patient's EKG with NSR @ 83 bpm, some ST depressions in V4-V6. Labs significant for: K: 2.4, BUN: 60, Troponin: 0.05. Patient received KCL 40 mEq.  Patient to be further management for acute on chronic diastolic heart failure and further monitoring for afib, however, now in NSR.    Pt is a 71 y/o F, current smoker (1 ppd x >30 years), POOR HISTORIAN, with PMHx of CAD (s/p MI with PCI in  - LAD? at [facility]), chronic diastolic CHF (EF > 60% as per echo 2020), HTN, HLD, fibromyalgia, presented to [facility] by EMS on 3/15/2021 endorsing feeling like her "heart was racing" and weakness for the past 18 hours. Patient called 911 earlier this morning, due to chest pain, anxiety, and shortness of breath. Per EMS, patient in AFIB with RVR (rates to:150s - however strip not present on arrival to [facility]). Patient received Cardizem 20 mg IVP x 1 in route to ED, HR on arrival to [facility], patient in NSR with HR to 80s. Patient denies hx of afib in the past, but does state she was told she had had an arrythmia in which she was told to massage her carotid for - in which she was, but continued to have palpitations this time. Patient reports intermittent mild 6/10 chest pressure. Patient also states that she has been having a hard time throughout the pandemic, reports to >100 lb weight gain over the past year and has been inactive due to hip replacement 3 years ago. Patient reports feeling depressed and has been binge eating and drinking more alcohol than usual. Patient also stating that she has been prescribed a diuretic from cardiologist, Dr. Ireland, but has not been using them as she does not "want to be dependent." Patient denies fever, chills, diaphoresis, MORRIS, orthopnea, cough, hemoptysis, peripheral edema, focal weakness, numbness, tingling, paresthesia, HA, dizziness, neck pain, N/V/D/C, recent fall, recent travel, or sick contacts.  [facility], VS: T: 98.2   BP:124/69 mmHg   HR:88  spO2: 98% on RA   RR: 16  Labs significant for: Sodium: 131, Potassium: 2.0, Ma.8, BUN/Cr: 71/1.51, Troponin I: 0.161, Creatinine Kinase: 270, CFR: 35. EKG: NSR @ 82 bpm. CXR: Cardiomegaly, no acute opacity.   COVID-PCR: negative. At [facility], patient received KCL 10 meq IV x 1, KCL 40 mEq PO x 1, Magnesium 2 mg IVP x 1, and Ondansterone 4 mg IVP x 1.  However, patient only tolerated 1/2 IV potassium due to burning at site.   On arrival to [facility], patient CP free. VSS. Pt found to be overloaded with 2+ pitting LE edema. Patient's EKG with NSR @ 83 bpm, mild ST depressions in V4-V6. Labs significant for: K: 2.4, BUN: 60, Troponin: 0.05. Patient received KCL 40 mEq.  Patient to be further management for hypokalemia, acute on chronic diastolic heart failure, and further monitoring for afib, however, now in NSR.

## 2021-03-15 NOTE — H&P ADULT - PROBLEM SELECTOR PLAN 10
- Continue home Venlafaxine 150 twice daily      F: none  E: K> 4, Mg > 2  N: DASH/TLC, fluid restriction <1.5L  Dvt: Lovenox 40 q12h  Dispo: pending clinical progression     Case discussed with Dr. Black

## 2021-03-15 NOTE — ED PROVIDER NOTE - NS ED ROS FT
· CONSTITUTIONAL: no fever and no chills.  · CARDIOVASCULAR: normal rate and rhythm, no chest pain and no edema.  · RESPIRATORY: no chest pain, no cough, and no shortness of breath.  · GASTROINTESTINAL: no abdominal pain, no bloating, no constipation, no diarrhea, + nausea and no vomiting.  · MUSCULOSKELETAL: no back pain, no musculoskeletal pain, no neck pain, and no weakness.  · SKIN: no abrasions, no jaundice, no lesions, no pruritis, and no rashes.  · NEURO: no loss of consciousness, no gait abnormality, no headache, no sensory deficits, and no weakness.  · PSYCHIATRIC: no known mental health issues.

## 2021-03-15 NOTE — H&P ADULT - NSICDXPASTSURGICALHX_GEN_ALL_CORE_FT
PAST SURGICAL HISTORY:  No significant past surgical history      PAST SURGICAL HISTORY:  History of right hip replacement

## 2021-03-15 NOTE — H&P ADULT - PROBLEM SELECTOR PLAN 2
- Patient found to be in afib with RVR rates to 150s en route to [facility], was given Cardizem 20 mg IV x 1 and broke to NSR with rates in 80s  - Patient without hx of afib, but does report an "arrythmia in which she gives herself carotid massages for"  - Currently in NSR   - Obtain TSH   - Continue to monitor on tele, consider EP consult - Patient found to be in afib with RVR rates to 150s en route to [facility], was given Cardizem 20 mg IV x 1 and broke to NSR with rates in 80s  - Patient without hx of afib, but does report an "arrythmia in which she gives herself carotid massages for"  - Currently in NSR   - TSH WNL   - Continue Toprol XL 25 mg once daily   - Continue to monitor on tele, consider EP consult

## 2021-03-15 NOTE — ED PROVIDER NOTE - ADDITIONAL EKG
Ear Cerumen Removal  Date/Time: 12/7/2018 2:30 PM  Performed by: Radha Calderón NP  Authorized by: Radha Calderón NP     Ceruminolytic: colace.  Location details:  Both ears  Procedure type: irrigation    Cerumen  Removal Results:  Cerumen completely removed  Patient tolerance:  Patient tolerated the procedure well with no immediate complications     Left TM with infection noted.        Additional EKG Note...

## 2021-03-15 NOTE — ED ADULT TRIAGE NOTE - CHIEF COMPLAINT QUOTE
Pt with complaint of feeling "woozy and nausea" at triage. Pt brought in by EMS stating that she started to have chest pressure at around 10 pm. Pt called 911 this morning, additionally complaining of anxiety and shortness of breath. Per EMS, pt with afib with RVR, given 20mg IV push Cardizem with positive response. Pt now with HR of 88. Denies chest pressure and SOB. Reports history of an MI 20 years ago and fibromyalgia.

## 2021-03-15 NOTE — H&P ADULT - PROBLEM SELECTOR PLAN 8
- Continue Venlafaxine 150 mg once daily      F: none  E: K> 4, Mg > 2  N: DASH/TLC, fluid restriction <1.5L  Dvt: Heparin   Dispo: pending clinical progression     Case discussed with Dr. Black - Continue home Venlafaxine 150 twice daily      F: none  E: K> 4, Mg > 2  N: DASH/TLC, fluid restriction <1.5L  Dvt: Lovenox 40 q12h  Dispo: pending clinical progression     Case discussed with Dr. Black Hyponatremic to 129   - Obtain urine lytes  - holding diuretics  - Consider med consult

## 2021-03-15 NOTE — CONSULT NOTE ADULT - ASSESSMENT
69 y/o F, current smoker (1 ppd x >30 years), POOR HISTORIAN, with PMHx of CAD (s/p MI with PCI in 2001 - LAD? at [facility]), chronic diastolic CHF (EF > 60% as per echo 12/2020), HTN, HLD, fibromyalgia nephrology consulted for hypokalemia. Patient reports that she recently started on diuretics, she was started on lasix unclear what dose outpatient med rec shows that she takes lasix 80mg and metolazone 5mg. She says that she has taking consistently in the past week and sometimes she takes more than prescribed.     #Hypokalemia - likely in setting of diuretic use. Patient denies any diarrhea. Patient p/w serum K of 2.0 and sNa of 130.   - continue to replete K aggressively orally patient currently refusing IV supplementation 2/2 pain  - trend BMP q4-6  - maintain Mg >2.0   - hold diuretics    #Hyponatremia - likely 2/2 diuretic use. Currently asymptomatic  - please obtain urine osm and na  - trend BMP q4-6  - please do not correct more than 6-8meq in 24 hours.   - hold diuretics    #MARSHA - patient p/w sCr of 1.51 now improved to 1.11. Currently with appropriate UOP.   - please obtain urine cr, urea nitrogen and creatinine  - trend sCr  - obtain bladder scan with PVR to r/o retention 69 y/o F, current smoker (1 ppd x >30 years), POOR HISTORIAN, with PMHx of CAD (s/p MI with PCI in 2001 - LAD? at [facility]), chronic diastolic CHF (EF > 60% as per echo 12/2020), HTN, HLD, fibromyalgia nephrology consulted for hypokalemia. Patient reports that she recently started on diuretics, she was started on lasix unclear what dose outpatient med rec shows that she takes lasix 80mg and metolazone 5mg. She says that she has taking consistently in the past week and sometimes she takes more than prescribed.     #Hypokalemia - likely in setting of diuretic use. Patient denies any diarrhea. Patient p/w serum K of 2.0 and sNa of 130.   - continue to replete K aggressively orally patient currently refusing IV supplementation 2/2 pain  - trend BMP q4-6  - maintain Mg >2.0   - hold diuretics    #Hyponatremia - likely 2/2 diuretic use. Currently asymptomatic  - please obtain urine osm and na  - trend BMP q4-6  - please do not correct more than 6-8meq in 24 hours.   - hold diuretics    #MARSHA - patient p/w sCr of 1.51 now improved to 1.11. Currently with appropriate UOP.   - please obtain urine cr, urea nitrogen and creatinine  - trend sCr  - obtain bladder scan with PVR to r/o retention    #LE Edema - patient with chronic hx of LE swelling, for which she was started on diuretics. TTE with normal EF, chest clear to auscultation. Edema likely 2/2 CVI  - please obtain b/l LE doppler to r/o DVT.   - hold diuretics      Plan discussed with Dr. Garcia

## 2021-03-16 LAB
ALBUMIN SERPL ELPH-MCNC: 4.1 G/DL — SIGNIFICANT CHANGE UP (ref 3.3–5)
ALP SERPL-CCNC: 106 U/L — SIGNIFICANT CHANGE UP (ref 40–120)
ALT FLD-CCNC: 33 U/L — SIGNIFICANT CHANGE UP (ref 10–45)
ANION GAP SERPL CALC-SCNC: 11 MMOL/L — SIGNIFICANT CHANGE UP (ref 5–17)
ANION GAP SERPL CALC-SCNC: 8 MMOL/L — SIGNIFICANT CHANGE UP (ref 5–17)
AST SERPL-CCNC: 33 U/L — SIGNIFICANT CHANGE UP (ref 10–40)
BASOPHILS # BLD AUTO: 0.04 K/UL — SIGNIFICANT CHANGE UP (ref 0–0.2)
BASOPHILS NFR BLD AUTO: 0.6 % — SIGNIFICANT CHANGE UP (ref 0–2)
BILIRUB SERPL-MCNC: 0.3 MG/DL — SIGNIFICANT CHANGE UP (ref 0.2–1.2)
BUN SERPL-MCNC: 43 MG/DL — HIGH (ref 7–23)
BUN SERPL-MCNC: 56 MG/DL — HIGH (ref 7–23)
CALCIUM SERPL-MCNC: 9.3 MG/DL — SIGNIFICANT CHANGE UP (ref 8.4–10.5)
CALCIUM SERPL-MCNC: 9.8 MG/DL — SIGNIFICANT CHANGE UP (ref 8.4–10.5)
CHLORIDE SERPL-SCNC: 87 MMOL/L — LOW (ref 96–108)
CHLORIDE SERPL-SCNC: 93 MMOL/L — LOW (ref 96–108)
CO2 SERPL-SCNC: 39 MMOL/L — HIGH (ref 22–31)
CO2 SERPL-SCNC: 40 MMOL/L — HIGH (ref 22–31)
CORTIS AM PEAK SERPL-MCNC: 14.8 UG/DL — SIGNIFICANT CHANGE UP (ref 3.9–37.5)
CREAT ?TM UR-MCNC: 79 MG/DL — SIGNIFICANT CHANGE UP
CREAT SERPL-MCNC: 0.91 MG/DL — SIGNIFICANT CHANGE UP (ref 0.5–1.3)
CREAT SERPL-MCNC: 1.22 MG/DL — SIGNIFICANT CHANGE UP (ref 0.5–1.3)
EOSINOPHIL # BLD AUTO: 0.14 K/UL — SIGNIFICANT CHANGE UP (ref 0–0.5)
EOSINOPHIL NFR BLD AUTO: 2.1 % — SIGNIFICANT CHANGE UP (ref 0–6)
GLUCOSE SERPL-MCNC: 108 MG/DL — HIGH (ref 70–99)
GLUCOSE SERPL-MCNC: 141 MG/DL — HIGH (ref 70–99)
HCT VFR BLD CALC: 42 % — SIGNIFICANT CHANGE UP (ref 34.5–45)
HCV AB S/CO SERPL IA: 11.63 S/CO — SIGNIFICANT CHANGE UP
HCV AB SERPL-IMP: REACTIVE
HGB BLD-MCNC: 13.4 G/DL — SIGNIFICANT CHANGE UP (ref 11.5–15.5)
IMM GRANULOCYTES NFR BLD AUTO: 0.4 % — SIGNIFICANT CHANGE UP (ref 0–1.5)
LYMPHOCYTES # BLD AUTO: 1.27 K/UL — SIGNIFICANT CHANGE UP (ref 1–3.3)
LYMPHOCYTES # BLD AUTO: 19 % — SIGNIFICANT CHANGE UP (ref 13–44)
MAGNESIUM SERPL-MCNC: 2.7 MG/DL — HIGH (ref 1.6–2.6)
MCHC RBC-ENTMCNC: 27.9 PG — SIGNIFICANT CHANGE UP (ref 27–34)
MCHC RBC-ENTMCNC: 31.9 GM/DL — LOW (ref 32–36)
MCV RBC AUTO: 87.5 FL — SIGNIFICANT CHANGE UP (ref 80–100)
MONOCYTES # BLD AUTO: 0.77 K/UL — SIGNIFICANT CHANGE UP (ref 0–0.9)
MONOCYTES NFR BLD AUTO: 11.5 % — SIGNIFICANT CHANGE UP (ref 2–14)
NEUTROPHILS # BLD AUTO: 4.45 K/UL — SIGNIFICANT CHANGE UP (ref 1.8–7.4)
NEUTROPHILS NFR BLD AUTO: 66.4 % — SIGNIFICANT CHANGE UP (ref 43–77)
NRBC # BLD: 0 /100 WBCS — SIGNIFICANT CHANGE UP (ref 0–0)
OSMOLALITY UR: 456 MOSM/KG — SIGNIFICANT CHANGE UP (ref 300–900)
PLATELET # BLD AUTO: 266 K/UL — SIGNIFICANT CHANGE UP (ref 150–400)
POTASSIUM SERPL-MCNC: 3.6 MMOL/L — SIGNIFICANT CHANGE UP (ref 3.5–5.3)
POTASSIUM SERPL-MCNC: 4 MMOL/L — SIGNIFICANT CHANGE UP (ref 3.5–5.3)
POTASSIUM SERPL-SCNC: 3.6 MMOL/L — SIGNIFICANT CHANGE UP (ref 3.5–5.3)
POTASSIUM SERPL-SCNC: 4 MMOL/L — SIGNIFICANT CHANGE UP (ref 3.5–5.3)
PROT SERPL-MCNC: 7.4 G/DL — SIGNIFICANT CHANGE UP (ref 6–8.3)
RBC # BLD: 4.8 M/UL — SIGNIFICANT CHANGE UP (ref 3.8–5.2)
RBC # FLD: 15.8 % — HIGH (ref 10.3–14.5)
SODIUM SERPL-SCNC: 137 MMOL/L — SIGNIFICANT CHANGE UP (ref 135–145)
SODIUM SERPL-SCNC: 141 MMOL/L — SIGNIFICANT CHANGE UP (ref 135–145)
SODIUM UR-SCNC: 29 MMOL/L — SIGNIFICANT CHANGE UP
TROPONIN T SERPL-MCNC: 0.03 NG/ML — HIGH (ref 0–0.01)
UUN UR-MCNC: 826 MG/DL — SIGNIFICANT CHANGE UP
WBC # BLD: 6.7 K/UL — SIGNIFICANT CHANGE UP (ref 3.8–10.5)
WBC # FLD AUTO: 6.7 K/UL — SIGNIFICANT CHANGE UP (ref 3.8–10.5)

## 2021-03-16 PROCEDURE — 99232 SBSQ HOSP IP/OBS MODERATE 35: CPT

## 2021-03-16 PROCEDURE — 99231 SBSQ HOSP IP/OBS SF/LOW 25: CPT

## 2021-03-16 RX ORDER — DIAZEPAM 5 MG
2 TABLET ORAL ONCE
Refills: 0 | Status: DISCONTINUED | OUTPATIENT
Start: 2021-03-16 | End: 2021-03-16

## 2021-03-16 RX ORDER — IPRATROPIUM BROMIDE 0.2 MG/ML
500 SOLUTION, NON-ORAL INHALATION EVERY 6 HOURS
Refills: 0 | Status: DISCONTINUED | OUTPATIENT
Start: 2021-03-16 | End: 2021-03-23

## 2021-03-16 RX ORDER — RIVAROXABAN 15 MG-20MG
20 KIT ORAL
Refills: 0 | Status: DISCONTINUED | OUTPATIENT
Start: 2021-03-16 | End: 2021-03-21

## 2021-03-16 RX ORDER — SPIRONOLACTONE 25 MG/1
25 TABLET, FILM COATED ORAL DAILY
Refills: 0 | Status: DISCONTINUED | OUTPATIENT
Start: 2021-03-16 | End: 2021-03-23

## 2021-03-16 RX ORDER — ACETAMINOPHEN 500 MG
650 TABLET ORAL EVERY 6 HOURS
Refills: 0 | Status: DISCONTINUED | OUTPATIENT
Start: 2021-03-16 | End: 2021-03-20

## 2021-03-16 RX ADMIN — Medication 500 MICROGRAM(S): at 21:50

## 2021-03-16 RX ADMIN — RIVAROXABAN 20 MILLIGRAM(S): KIT at 17:26

## 2021-03-16 RX ADMIN — Medication 2 MILLIGRAM(S): at 21:24

## 2021-03-16 RX ADMIN — Medication 40 MILLIEQUIVALENT(S): at 05:57

## 2021-03-16 RX ADMIN — Medication 150 MILLIGRAM(S): at 12:18

## 2021-03-16 RX ADMIN — BENZOCAINE AND MENTHOL 1 LOZENGE: 5; 1 LIQUID ORAL at 06:07

## 2021-03-16 RX ADMIN — Medication 650 MILLIGRAM(S): at 18:27

## 2021-03-16 RX ADMIN — BENZOCAINE AND MENTHOL 1 LOZENGE: 5; 1 LIQUID ORAL at 17:27

## 2021-03-16 RX ADMIN — Medication 2 MILLIGRAM(S): at 00:09

## 2021-03-16 RX ADMIN — Medication 81 MILLIGRAM(S): at 12:18

## 2021-03-16 RX ADMIN — Medication 40 MILLIEQUIVALENT(S): at 01:10

## 2021-03-16 RX ADMIN — ENOXAPARIN SODIUM 40 MILLIGRAM(S): 100 INJECTION SUBCUTANEOUS at 05:57

## 2021-03-16 RX ADMIN — ATORVASTATIN CALCIUM 40 MILLIGRAM(S): 80 TABLET, FILM COATED ORAL at 21:24

## 2021-03-16 RX ADMIN — SPIRONOLACTONE 25 MILLIGRAM(S): 25 TABLET, FILM COATED ORAL at 12:18

## 2021-03-16 RX ADMIN — Medication 650 MILLIGRAM(S): at 17:27

## 2021-03-16 RX ADMIN — BENZOCAINE AND MENTHOL 1 LOZENGE: 5; 1 LIQUID ORAL at 15:08

## 2021-03-16 RX ADMIN — Medication 25 MILLIGRAM(S): at 05:57

## 2021-03-16 NOTE — CONSULT NOTE ADULT - SUBJECTIVE AND OBJECTIVE BOX
Rosibel Lehman is a 69 yo F current smoker, poor historian, hx of CAD (s/p MI with PCI in 2001 to LAD?), chronic diastolic  HPI:  Pt is a 71 y/o female, current smoker (1 ppd x >30 years), CAD (s/p MI with PCI in 2001 - LAD), chronic diastolic CHF, HTN, HLD, fibromyalgia, who had an episode of palpitations with newly diagnosed atrial fibrillation that self converted.  EP called for further recommendations.    She states that the last time she had palpitations was 30 years ago.  At that time she was told she should do a carotid massage which she had done for years - but then had a MI and was placed on Metoprolol and had not had any palpitations or need to do a carotid massage in 30 years.  She states that last week she was entertaining a guest and drank more alcohol then she usually does.  She had an episode of palpitations with association chest and arm pressure so she called 911.  [facility] she was in  AFIB with RVR (rates to:150s and received Cardizem 20 mg IVP x 1 in route to ED, HR on arrival to [facility], patient in NSR with HR to 80s. Patient also states that she has been having a hard time throughout the pandemic, reports to >100 lb weight gain over the past year and has been inactive due to hip replacement 3 years ago. Recent LE edema over but hasnt been using her diuretic regularly.  Patient denies fever, chills, diaphoresis, MORRIS, orthopnea, cough, hemoptysis, peripheral edema, focal weakness, numbness, tingling, paresthesia, HA, dizziness, neck pain, N/V/D/C, recent fall, recent travel, or sick contacts.  No further afib in hospital.  She has never been tested for sleep apnea but wakes up regularly at night and cant catch her breath       PAST MEDICAL & SURGICAL HISTORY:  High cholesterol  HTN (hypertension)  Myocardial [id number] at [facility] - reports LAD  History of right hip replacement      Social History: see HPI      Inpatient Medications:   aspirin enteric coated 81 milliGRAM(s) Oral daily  atorvastatin 40 milliGRAM(s) Oral at bedtime  benzocaine 15 mG/menthol 3.6 mG (Sugar-Free) Lozenge 1 Lozenge Oral three times a day PRN  melatonin 3 milliGRAM(s) Oral at bedtime  metoprolol succinate ER 25 milliGRAM(s) Oral daily  rivaroxaban 20 milliGRAM(s) Oral with dinner  spironolactone 25 milliGRAM(s) Oral daily  venlafaxine XR. 150 milliGRAM(s) Oral daily      Allergies: No Known Allergies      ROS:   CONSTITUTIONAL: No fever, weight loss + fatigue  EYES: Pt denies  RESPIRATORY: No cough, wheezing, chills or hemoptysis  CARDIOVASCULAR: see HPI  GASTROINTESTINAL: Pt denies  NEUROLOGICAL: Pt denies  SKIN: Pt denies   PSYCHIATRIC: Pt denies  HEME/LYMPH: Pt denies    PHYSICAL:  T(C): 36.7 (03-16-21 @ 10:12), Max: 36.9 (03-16-21 @ 05:12)  HR: 84 (03-16-21 @ 12:40) (74 - 91)  BP: 116/84 (03-16-21 @ 12:40) (103/53 - 131/60)  RR: 18 (03-16-21 @ 12:40) (18 - 19)  SpO2: 94% (03-16-21 @ 12:40) (92% - 94%)     Appearance: No acute distress, well developed  Eyes: normal appearing conjunctiva, pupils and eyelids  Cardiovascular: Normal S1 S2,  + bilateral edema  Respiratory: Lungs clear   Gastrointestinal:  Soft  Neurologic:  No deficit noted  Psych: A&Ox3   Musculoskeletal: no deformities noted   Skin: no rash noted, normal color and pigmentation.        LABS:                        13.4   6.70  )-----------( 266      ( 16 Mar 2021 12:14 )             42.0     141  |  93<L>  |  43<H>  ----------------------------<  141<H>  4.0   |  40<H>  |  0.91    Ca    9.8      Mg     2.7     03-16  TPro  7.4  /  Alb  4.1  /  TBili  0.3  /  DBili  x   /  AST  33  /  ALT  33  /  AlkPhos  106 PT: 11.2 sec;   INR: 0.95    PTT:25.2 sec  TSH 0.8  TroponinTroponin I, Serum: 0.161 ng/mL (03-15 @ 06:36)      EKG: 3/15 sinus at 90    Telemetry: sinus 80-90    ECHO:   TTE Echo Complete w/o Contrast w/ Doppler (03.15.21 @ 13:32) >   1. Normal left and right ventricular size and systolic function.   2. No significant valvular disease.   3. No evidence of pulmonary hypertension.   4. No pericardial effusion.          Assessment Plan:  71 y/o female, current smoker (1 ppd x >30 years), CAD (s/p MI with PCI in 2001 - LAD), chronic diastolic CHF, HTN, HLD, fibromyalgia, who had an episode of palpitations with newly diagnosed atrial fibrillation that self converted.  Last episode of SVT was 30 years ago - she had no palpitations until this incidence when she was in atrial fibrillation - converted after cardizem.  Started on xarelto.  We discussed in great detail what atrial fibrillation is and the association with stroke.  This is her first episode and recommend beta blocker and NOAC on discharge.  Follow up to discuss long term options when/if she has another episode (we discussed long term with recurrence ablation vs. anti arrhythmic medications vs. rate control).  I have encouraged her to loose weight.  She should be tested for sleep apnea and if she has this we discussed the importance of compliance.  Will monitor tele while in house.  Follow up with Dr. Cortes in 6-8 weeks likely for outpatient event monitor

## 2021-03-16 NOTE — PROGRESS NOTE ADULT - PROBLEM SELECTOR PLAN 9
Lipid panel ordered for AM  - Home meds: Atorvastatin 40 mg once daily       F: none  E: K> 4, Mg > 2  N: DASH/TLC, fluid restriction <1.5L  Dvt: Heparin   Dispo: pending clinical progression     Case discussed with Dr. Black Lipid panel ordered for AM  - Home meds: Atorvastatin 40 mg once daily

## 2021-03-16 NOTE — DIETITIAN INITIAL EVALUATION ADULT. - PROBLEM SELECTOR PLAN 2
- Patient found to be in afib with RVR rates to 150s en route to [facility], was given Cardizem 20 mg IV x 1 and broke to NSR with rates in 80s  - Patient without hx of afib, but does report an "arrythmia in which she gives herself carotid massages for"  - Currently in NSR   - TSH WNL   - Continue Toprol XL 25 mg once daily   - Continue to monitor on tele, consider EP consult

## 2021-03-16 NOTE — PROGRESS NOTE ADULT - PROBLEM SELECTOR PLAN 2
Patient found to be in afib with RVR rates to 150s en route to [facility], was given Cardizem 20 mg IV x 1 and broke to NSR with rates in 80s  - Patient without hx of afib, but does report an "arrythmia in which she gives herself carotid massages for"  - Currently in NSR   - TSH WNL   - Continue Toprol XL 25 mg once daily   - Continue to monitor on tele  - EP recommended BB and NOAC w/ outpatient follow up

## 2021-03-16 NOTE — DIETITIAN INITIAL EVALUATION ADULT. - PROBLEM SELECTOR PLAN 6
- BUN/Cr elevated to 71/1.51 on admission  - CR improving to 1.11 with appropriate UOP   - F/u urine lytes   - f/u bladder scan with PVR to r/o retentio

## 2021-03-16 NOTE — PROGRESS NOTE ADULT - PROBLEM SELECTOR PLAN 4
Patient w/ K of 2.0 at [facility], repleted and now corrected at 4.0 on 03/16/2021  - Mag 2.7 on 03/16/2021  - Continue to monitor for EKG changes  - Renal consulted

## 2021-03-16 NOTE — DIETITIAN INITIAL EVALUATION ADULT. - PROBLEM SELECTOR PLAN 5
- Hx of CAD, received stent to LAD in 2001 at [facility]   - Continue daily Aspirin 81 mg once daily, Atorva 40 mg once daily, and Metoprolol XL 25 mg once daily

## 2021-03-16 NOTE — PROGRESS NOTE ADULT - SUBJECTIVE AND OBJECTIVE BOX
Patient seen and examined at bedside.   feeling well w/o complaints     aspirin enteric coated 81 milliGRAM(s) daily  atorvastatin 40 milliGRAM(s) at bedtime  benzocaine 15 mG/menthol 3.6 mG (Sugar-Free) Lozenge 1 Lozenge three times a day PRN  melatonin 3 milliGRAM(s) at bedtime  metoprolol succinate ER 25 milliGRAM(s) daily  rivaroxaban 20 milliGRAM(s) with dinner  spironolactone 25 milliGRAM(s) daily  venlafaxine XR. 150 milliGRAM(s) daily      Allergies    No Known Allergies    Intolerances        T(C): , Max: 36.9 (21 @ 05:12)  T(F): , Max: 98.4 (21 @ 05:12)  HR: 84 (21 @ 12:40)  BP: 116/84 (21 @ 12:40)  BP(mean): 77 (03-15-21 @ 17:22)  RR: 18 (21 @ 12:40)  SpO2: 94% (21 @ 12:40)  Wt(kg): --    03-15 @ 07:01  -   @ 07:00  --------------------------------------------------------  IN:    Oral Fluid: 180 mL  Total IN: 180 mL    OUT:    Voided (mL): 1000 mL  Total OUT: 1000 mL    Total NET: -820 mL       @ 07:01  -   @ 14:33  --------------------------------------------------------  IN:    Oral Fluid: 390 mL  Total IN: 390 mL    OUT:    Voided (mL): 400 mL  Total OUT: 400 mL    Total NET: -10 mL              PHYSICAL EXAM:  Constitutional: Well appearning.  No acute distress  ENMT: Moist mucous membrane.  No cyanosis.  Neck: No JVD.  Respiratory: Clear to auscultation   Cardiovascular: S1, S2.  Regular rate and rhythm.    Gastrointestinal: soft, non-tender, obese  Extremities: Warm.  1-2+ lower extremity edema, tenderness  Neurological: No focal deficits.  Skin: Warm. Dry.    Psychiatric: Normal affect.    ACCESS:       LABS:                        13.4   6.70  )-----------( 266      ( 16 Mar 2021 12:14 )             42.0         141  |  93<L>  |  43<H>  ----------------------------<  141<H>  4.0   |  40<H>  |  0.91    Ca    9.8      16 Mar 2021 12:14  Mg     2.7         TPro  7.4  /  Alb  4.1  /  TBili  0.3  /  DBili  x   /  AST  33  /  ALT  33  /  AlkPhos  106  -      PT/INR - ( 15 Mar 2021 06:36 )   PT: 11.2 sec;   INR: 0.95          PTT - ( 15 Mar 2021 06:36 )  PTT:25.2 sec  Urinalysis Basic - ( 15 Mar 2021 07:58 )    Color: Yellow / Appearance: Clear / S.010 / pH: x  Gluc: x / Ketone: NEGATIVE  / Bili: NEGATIVE / Urobili: 0.2 E.U./dL   Blood: x / Protein: NEGATIVE mg/dL / Nitrite: NEGATIVE   Leuk Esterase: NEGATIVE / RBC: x / WBC x   Sq Epi: x / Non Sq Epi: x / Bacteria: x      Sodium, Random Urine: 29 mmol/L ( @ 00:27)  Osmolality, Random Urine: 456 mosm/kg [300 - 900] ( @ 00:27)        RADIOLOGY & ADDITIONAL STUDIES:

## 2021-03-16 NOTE — PROGRESS NOTE ADULT - PROBLEM SELECTOR PLAN 5
Hx of CAD, received stent to LAD in 2001 at [facility]   - Continue daily Aspirin 81 mg once daily, Atorvastatin 40 mg once daily, and Toprol XL 25 mg once daily

## 2021-03-16 NOTE — DIETITIAN INITIAL EVALUATION ADULT. - PROBLEM SELECTOR PLAN 3
- Patient with 6/10 chest pressure and tightness on arrival to [facility], now resolved   - Troponin T 0.05 x2, EKG with ST depressions in V4-V6  - Trend Troponin with serial EKGs

## 2021-03-16 NOTE — DIETITIAN INITIAL EVALUATION ADULT. - PROBLEM SELECTOR PLAN 7
- SBPs 120s on arrival   - Home meds include Toprol XL 50 mg, Lasix 80 mg once daily, and Losartan 50 mg once daily  - Holding: Losartan and diuretics  - Continue: Toprol XL 25 mg once daily

## 2021-03-16 NOTE — DIETITIAN INITIAL EVALUATION ADULT. - PROBLEM SELECTOR PLAN 4
Patient with K of 2.0 at [facility], received IV KCL 10 mEq but only received 1/2 dose due to burning at site and Kdur 40 mEq x2. K on arrival to [facility] 2.4. Ordered for Kdur 40 mEq PO x 2, Kdur 10 IVP x 1, repeat K with Mg at 4 pm    - Mg 1.8 and given Mg 2g IV at [facility]  - Continue to monitor for EKG changes  - Renal consulted - rec to replete K aggressively and f/u urine lytes

## 2021-03-16 NOTE — DIETITIAN INITIAL EVALUATION ADULT. - OTHER CALCULATIONS
IBW used to calculate energy needs due to pt's current body weight exceeding 120% of IBW; adjusted for age/BMI, CHF; fluids per team

## 2021-03-16 NOTE — PROGRESS NOTE ADULT - PROBLEM SELECTOR PLAN 10
Home med: Venlafaxine 150 mg BID, confirmed w/ patient and pharmacy (high dose)  - ordered for Venlafaxine 150 mg daily       F: none  E: K> 4, Mg > 2  N: DASH/TLC, fluid restriction <1.5L  VTE PPX: Xarelto  Dispo: pending clinical progression     Case d/w Dr. Black

## 2021-03-16 NOTE — PROGRESS NOTE ADULT - ASSESSMENT
hypokalemia and MARSHA from diuretics resolved   agree with aldactone for edema -- can consider gentle loop diuretic as needed but watch lytes  r/o cirrhosis, or vascular cause  as source for LE edema   will sign off -- please call if need

## 2021-03-16 NOTE — PROGRESS NOTE ADULT - ASSESSMENT
69 y/o female, current smoker (1 PPD x >30 years), POOR HISTORIAN, w/ PMHx CAD (s/p MI w/ PCI in 2001, LAD? at [facility]), chronic diastolic CHF (EF >60% as per echo 12/2020), HTN, HLD, and fibromyalgia who presented to [facility] by EMS on 3/15/2021 endorsing feeling like her "heart was racing" and weakness for the past 18 hours. Per EMS, patient in A-FIB w/ RVR (rates to 150's, however strip not present on arrival to [facility]). Patient received Cardizem 20 mg IVP x 1 and converted to NSR, rates 80's on arrival to [facility]. Patient also found to be hypokalemic w/ K 2.0. Patient transferred to [facility] for further management of A-Fib w/ RVR and hypokalemia, subsequently found to be in acute diastolic CHF on arrival to [facility]. Patient's potassium was repleted and K 4.0 on 03/16/2021, started on Spironolactone 25 mg daily for potassium sparing diuretic, initiation on Xarelto 20 mg daily, and EP was consulted. Echocardiogram from 03/15/2021 was normal. EP recommended patient be continued on BB and NOAC and follow up as an outpatient for further management.

## 2021-03-16 NOTE — DIETITIAN INITIAL EVALUATION ADULT. - PROBLEM SELECTOR PLAN 1
- On admit, patient with 2+ B/L LE edema, unable to lay flat, sleeps on recliner at home  - CXR with mild pulmonary vascular congestion, satting 98% on RA   - EKG: NSR @ 83 bpm, mild ST depressions in leads V4-V6  - Troponin elevated to 0.05 x2, continue to trend to peak  - Follows with Dr. Arnold whom said patient had an echo back in 12/2020 with EF of 60% and trace MR  - Obtain repeat echo   - Home meds include Metolazone 5 mg once daily, Furosemide 80 mg once daily, Toprol XL 50 mg once daily, and Losartan 50 mg once daily  - Holding home diuretics and losartan for now; given elevated Cr and hypokalemia   - Holding diuretic until K repleted;   - B/L LE Doppler ordered  - Core measures, 1.5L fluid restriction, daily weight, strict I's and O's

## 2021-03-16 NOTE — PROGRESS NOTE ADULT - PROBLEM SELECTOR PLAN 8
RESOLVED; Hyponatremic to 129 on admission  - normal at 141 on 03/16  - initiated on Spironolactone 25 mg daily

## 2021-03-16 NOTE — DIETITIAN INITIAL EVALUATION ADULT. - OTHER INFO
69 y/o F, current smoker (1 ppd x >30 years), POOR HISTORIAN, with PMHx of CAD (s/p MI with PCI in 2001 - LAD? at [facility]), chronic diastolic CHF (EF > 60% as per echo 12/2020), HTN, HLD, fibromyalgia, presented to [facility] by EMS on 3/15/2021 endorsing feeling like her "heart was racing" and weakness. Per EMS, pt in AFIB with RVR, received Cardizem 20 mg IVP x 1 and converted to NSR, rates 80s on arrival to [facility]. Now admitted further management for hypokalemia, acute on chronic diastolic heart failure, and further monitoring for afib, however, now in NSR.   Pt consulted to be seen, per RN pt asking for diet education. Pt had to remain focused however happy for RD visit. Seems to have limited insight into diet. Per H&P, Pt reports feeling depressed and has been binge eating and drinking more alcohol than usual. Pt reported to RD she had been consuming much takeout as of recent d/t issues with walking 2/2 edema. Reports unable to go food shopping as a result. Pt reports she now has a roommate who can help her with food shopping upon d/c. Pt reports she is ready to make diet changes and seems motivated to do so. Pt reports wt gain 2/2 fluids PTA however exact wt hx not provided. Per H&P 100 pound wt gain PTA. RN reports plan to provide pt with scale. Currently noted with 2+ Edema (left ankle; right ankle; left foot; right foot). No pain. Da 19. No pressure ulcers. BM 3/15.   Please see below for nutritions recommendations/education.

## 2021-03-16 NOTE — PROGRESS NOTE ADULT - PROBLEM SELECTOR PLAN 3
Patient with 6/10 chest pressure and tightness on arrival to [facility], now resolved   - EKG with ST depressions in V4-V6  - Trop peaked at 0.05, no longer trending

## 2021-03-16 NOTE — DIETITIAN INITIAL EVALUATION ADULT. - PERSON TAUGHT/METHOD
CHF diet education provided. Discussed importance of choosing low salt foods, following MD determined fluid restrictions. Reviewed foods high/low in salt./verbal instruction/written material/patient instructed/teach back - (Patient repeats in own words)

## 2021-03-16 NOTE — PROGRESS NOTE ADULT - PROBLEM SELECTOR PLAN 1
On admit, patient with 2+ B/L LE edema, unable to lay flat, sleeps on recliner at home  - CXR with mild pulmonary vascular congestion, satting 98% on RA   - EKG: NSR @ 83 bpm, mild ST depressions in leads V4-V6  - Troponin peaked at 0.05, no longer trending   - Follows with Dr. Arnold whom said patient had an echo back in 12/2020 with EF of 60% and trace MR  - Repeat Echocardiogram normal as above    - Home meds include Metolazone 5 mg once daily, Furosemide 80 mg once daily, Toprol XL 50 mg once daily, and Losartan 50 mg once daily  - started on Spironolactone 25 mg daily (potassium sparing), consider further diuresis in AM   - B/L LE Doppler ordered  - Core measures, 1.5L fluid restriction, daily weight, strict I's and O's

## 2021-03-16 NOTE — PROGRESS NOTE ADULT - SUBJECTIVE AND OBJECTIVE BOX
Interventional Cardiology PA Adult Progress Note    C.C.: Chest Pain, SOB     Subjective Assessment: Patient seen and examined at bedside today. Patient w/ many questions about her medical care but appeared in no acute distress on exam.     ROS Negative except as per Subjective and HPI  	  MEDICATIONS:  metoprolol succinate ER 25 milliGRAM(s) Oral daily  spironolactone 25 milliGRAM(s) Oral daily  acetaminophen   Tablet .. 650 milliGRAM(s) Oral every 6 hours PRN  melatonin 3 milliGRAM(s) Oral at bedtime  venlafaxine XR. 150 milliGRAM(s) Oral daily  atorvastatin 40 milliGRAM(s) Oral at bedtime  aspirin enteric coated 81 milliGRAM(s) Oral daily  benzocaine 15 mG/menthol 3.6 mG (Sugar-Free) Lozenge 1 Lozenge Oral three times a day PRN  rivaroxaban 20 milliGRAM(s) Oral with dinner      PHYSICAL EXAM:  TELEMETRY: No overnight events, remains in sinus rhythm.   T(C): 36.8 (03-16-21 @ 18:05), Max: 36.9 (03-16-21 @ 05:12)  HR: 89 (03-16-21 @ 17:31) (74 - 91)  BP: 125/60 (03-16-21 @ 17:31) (110/58 - 131/60)  RR: 18 (03-16-21 @ 17:31) (18 - 19)  SpO2: 94% (03-16-21 @ 17:31) (93% - 94%)  Wt(kg): --  I&O's Summary    15 Mar 2021 07:01  -  16 Mar 2021 07:00  --------------------------------------------------------  IN: 180 mL / OUT: 1000 mL / NET: -820 mL    16 Mar 2021 07:01  -  16 Mar 2021 19:47  --------------------------------------------------------  IN: 570 mL / OUT: 700 mL / NET: -130 mL                                         Appearance: NAD 	  HEENT: Normal oral mucosa, PERRL, EOMI	  Neck: Supple, - JVD  Cardiovascular: Normal S1/S2, No JVD, No murmurs   Respiratory: Lungs clear to auscultation, No Rales, Rhonchi, Wheezing	  Gastrointestinal:  Soft, Non-tender  Skin: No rashes, No ecchymoses, No cyanosis  Extremities: Normal range of motion, No clubbing, cyanosis or edema  Vascular: Peripheral pulses palpable 2+ bilaterally  Neurologic: Non-focal  Psychiatry: A & O x 3, Mood & affect appropriate  	    ECG:  	  RADIOLOGY:   DIAGNOSTIC TESTING:  [X] Echocardiogram: normal LV/RV size and function w/ EF 60%, no valvular disease, no pulmonary hypertension, and no pericardial effusion   [ ] Catheterization:  [ ] Stress Test:    [ ] AMINA	    LABS:	 	               13.4   6.70  )-----------( 266      ( 16 Mar 2021 12:14 )             42.0     03-16    141  |  93<L>  |  43<H>  ----------------------------<  141<H>  4.0   |  40<H>  |  0.91    Ca    9.8      16 Mar 2021 12:14  Mg     2.7     03-16    TPro  7.4  /  Alb  4.1  /  TBili  0.3  /  DBili  x   /  AST  33  /  ALT  33  /  AlkPhos  106  03-16    PT/INR - ( 15 Mar 2021 06:36 )   PT: 11.2 sec;   INR: 0.95          PTT - ( 15 Mar 2021 06:36 )  PTT:25.2 sec

## 2021-03-16 NOTE — PROGRESS NOTE ADULT - PROBLEM SELECTOR PLAN 7
SBP's ranging 100's to 130's   - Home meds include Toprol XL 50 mg, Lasix 80 mg once daily, and Losartan 50 mg once daily  - initiated on Spironolactone 25 mg daily   - Continue: Toprol XL 25 mg once daily

## 2021-03-17 LAB
ALBUMIN SERPL ELPH-MCNC: 4.4 G/DL — SIGNIFICANT CHANGE UP (ref 3.3–5)
ALP SERPL-CCNC: 113 U/L — SIGNIFICANT CHANGE UP (ref 40–120)
ALT FLD-CCNC: 36 U/L — SIGNIFICANT CHANGE UP (ref 10–45)
ANION GAP SERPL CALC-SCNC: 12 MMOL/L — SIGNIFICANT CHANGE UP (ref 5–17)
AST SERPL-CCNC: 36 U/L — SIGNIFICANT CHANGE UP (ref 10–40)
BASOPHILS # BLD AUTO: 0.07 K/UL — SIGNIFICANT CHANGE UP (ref 0–0.2)
BASOPHILS NFR BLD AUTO: 0.9 % — SIGNIFICANT CHANGE UP (ref 0–2)
BILIRUB SERPL-MCNC: 0.3 MG/DL — SIGNIFICANT CHANGE UP (ref 0.2–1.2)
BUN SERPL-MCNC: 34 MG/DL — HIGH (ref 7–23)
CALCIUM SERPL-MCNC: 10.3 MG/DL — SIGNIFICANT CHANGE UP (ref 8.4–10.5)
CHLORIDE SERPL-SCNC: 91 MMOL/L — LOW (ref 96–108)
CHOLEST SERPL-MCNC: 167 MG/DL — SIGNIFICANT CHANGE UP
CO2 SERPL-SCNC: 33 MMOL/L — HIGH (ref 22–31)
CREAT SERPL-MCNC: 0.79 MG/DL — SIGNIFICANT CHANGE UP (ref 0.5–1.3)
EOSINOPHIL # BLD AUTO: 0.28 K/UL — SIGNIFICANT CHANGE UP (ref 0–0.5)
EOSINOPHIL NFR BLD AUTO: 3.7 % — SIGNIFICANT CHANGE UP (ref 0–6)
GLUCOSE SERPL-MCNC: 111 MG/DL — HIGH (ref 70–99)
HCT VFR BLD CALC: 43 % — SIGNIFICANT CHANGE UP (ref 34.5–45)
HDLC SERPL-MCNC: 84 MG/DL — SIGNIFICANT CHANGE UP
HGB BLD-MCNC: 14 G/DL — SIGNIFICANT CHANGE UP (ref 11.5–15.5)
IMM GRANULOCYTES NFR BLD AUTO: 0.4 % — SIGNIFICANT CHANGE UP (ref 0–1.5)
LIPID PNL WITH DIRECT LDL SERPL: 58 MG/DL — SIGNIFICANT CHANGE UP
LYMPHOCYTES # BLD AUTO: 2.42 K/UL — SIGNIFICANT CHANGE UP (ref 1–3.3)
LYMPHOCYTES # BLD AUTO: 32.1 % — SIGNIFICANT CHANGE UP (ref 13–44)
MAGNESIUM SERPL-MCNC: 2.1 MG/DL — SIGNIFICANT CHANGE UP (ref 1.6–2.6)
MCHC RBC-ENTMCNC: 28.1 PG — SIGNIFICANT CHANGE UP (ref 27–34)
MCHC RBC-ENTMCNC: 32.6 GM/DL — SIGNIFICANT CHANGE UP (ref 32–36)
MCV RBC AUTO: 86.3 FL — SIGNIFICANT CHANGE UP (ref 80–100)
MONOCYTES # BLD AUTO: 0.97 K/UL — HIGH (ref 0–0.9)
MONOCYTES NFR BLD AUTO: 12.8 % — SIGNIFICANT CHANGE UP (ref 2–14)
NEUTROPHILS # BLD AUTO: 3.78 K/UL — SIGNIFICANT CHANGE UP (ref 1.8–7.4)
NEUTROPHILS NFR BLD AUTO: 50.1 % — SIGNIFICANT CHANGE UP (ref 43–77)
NON HDL CHOLESTEROL: 83 MG/DL — SIGNIFICANT CHANGE UP
NRBC # BLD: 0 /100 WBCS — SIGNIFICANT CHANGE UP (ref 0–0)
PLATELET # BLD AUTO: 291 K/UL — SIGNIFICANT CHANGE UP (ref 150–400)
POTASSIUM SERPL-MCNC: 3.9 MMOL/L — SIGNIFICANT CHANGE UP (ref 3.5–5.3)
POTASSIUM SERPL-SCNC: 3.9 MMOL/L — SIGNIFICANT CHANGE UP (ref 3.5–5.3)
PROT SERPL-MCNC: 7.6 G/DL — SIGNIFICANT CHANGE UP (ref 6–8.3)
RBC # BLD: 4.98 M/UL — SIGNIFICANT CHANGE UP (ref 3.8–5.2)
RBC # FLD: 15.6 % — HIGH (ref 10.3–14.5)
SODIUM SERPL-SCNC: 136 MMOL/L — SIGNIFICANT CHANGE UP (ref 135–145)
TRIGL SERPL-MCNC: 123 MG/DL — SIGNIFICANT CHANGE UP
WBC # BLD: 7.55 K/UL — SIGNIFICANT CHANGE UP (ref 3.8–10.5)
WBC # FLD AUTO: 7.55 K/UL — SIGNIFICANT CHANGE UP (ref 3.8–10.5)

## 2021-03-17 PROCEDURE — 99232 SBSQ HOSP IP/OBS MODERATE 35: CPT

## 2021-03-17 PROCEDURE — 93970 EXTREMITY STUDY: CPT | Mod: 26

## 2021-03-17 RX ORDER — DIAZEPAM 5 MG
2 TABLET ORAL ONCE
Refills: 0 | Status: DISCONTINUED | OUTPATIENT
Start: 2021-03-17 | End: 2021-03-17

## 2021-03-17 RX ORDER — FUROSEMIDE 40 MG
40 TABLET ORAL
Refills: 0 | Status: DISCONTINUED | OUTPATIENT
Start: 2021-03-17 | End: 2021-03-21

## 2021-03-17 RX ORDER — POTASSIUM CHLORIDE 20 MEQ
30 PACKET (EA) ORAL ONCE
Refills: 0 | Status: COMPLETED | OUTPATIENT
Start: 2021-03-17 | End: 2021-03-17

## 2021-03-17 RX ORDER — DIPHENHYDRAMINE HCL 50 MG
25 CAPSULE ORAL ONCE
Refills: 0 | Status: COMPLETED | OUTPATIENT
Start: 2021-03-17 | End: 2021-03-17

## 2021-03-17 RX ADMIN — Medication 650 MILLIGRAM(S): at 11:23

## 2021-03-17 RX ADMIN — Medication 81 MILLIGRAM(S): at 11:14

## 2021-03-17 RX ADMIN — ATORVASTATIN CALCIUM 40 MILLIGRAM(S): 80 TABLET, FILM COATED ORAL at 21:36

## 2021-03-17 RX ADMIN — BENZOCAINE AND MENTHOL 1 LOZENGE: 5; 1 LIQUID ORAL at 02:09

## 2021-03-17 RX ADMIN — Medication 650 MILLIGRAM(S): at 12:23

## 2021-03-17 RX ADMIN — Medication 650 MILLIGRAM(S): at 00:36

## 2021-03-17 RX ADMIN — Medication 40 MILLIGRAM(S): at 17:58

## 2021-03-17 RX ADMIN — Medication 650 MILLIGRAM(S): at 19:13

## 2021-03-17 RX ADMIN — BENZOCAINE AND MENTHOL 1 LOZENGE: 5; 1 LIQUID ORAL at 19:13

## 2021-03-17 RX ADMIN — Medication 2 MILLIGRAM(S): at 22:20

## 2021-03-17 RX ADMIN — Medication 150 MILLIGRAM(S): at 11:14

## 2021-03-17 RX ADMIN — BENZOCAINE AND MENTHOL 1 LOZENGE: 5; 1 LIQUID ORAL at 12:22

## 2021-03-17 RX ADMIN — RIVAROXABAN 20 MILLIGRAM(S): KIT at 17:58

## 2021-03-17 RX ADMIN — Medication 25 MILLIGRAM(S): at 05:25

## 2021-03-17 RX ADMIN — Medication 25 MILLIGRAM(S): at 04:02

## 2021-03-17 RX ADMIN — Medication 30 MILLIEQUIVALENT(S): at 11:14

## 2021-03-17 RX ADMIN — Medication 500 MICROGRAM(S): at 12:22

## 2021-03-17 RX ADMIN — SPIRONOLACTONE 25 MILLIGRAM(S): 25 TABLET, FILM COATED ORAL at 05:25

## 2021-03-17 NOTE — PROGRESS NOTE ADULT - SUBJECTIVE AND OBJECTIVE BOX
Interventional Cardiology PA Adult Progress Note    C.C.: SOB    Subjective Assessment: Patient seen and examined at bedside this morning. Patient sitting up in chair and appears in good spirits on exam and in no acute distress. Patient states her legs are still swollen and red at times but does note that the redness has improved on exam. Patient denies any other active complaints on exam.     ROS Negative except as per Subjective and HPI  	  MEDICATIONS:  furosemide   Injectable 40 milliGRAM(s) IV Push two times a day  metoprolol succinate ER 25 milliGRAM(s) Oral daily  spironolactone 25 milliGRAM(s) Oral daily  ipratropium    for Nebulization 500 MICROGram(s) Nebulizer every 6 hours PRN  acetaminophen   Tablet .. 650 milliGRAM(s) Oral every 6 hours PRN  melatonin 3 milliGRAM(s) Oral at bedtime  venlafaxine XR. 150 milliGRAM(s) Oral daily  atorvastatin 40 milliGRAM(s) Oral at bedtime  aspirin enteric coated 81 milliGRAM(s) Oral daily  benzocaine 15 mG/menthol 3.6 mG (Sugar-Free) Lozenge 1 Lozenge Oral three times a day PRN  rivaroxaban 20 milliGRAM(s) Oral with dinner      PHYSICAL EXAM:  TELEMETRY: No significant events noted on telemetry.   T(C): 36.1 (03-17-21 @ 13:12), Max: 36.8 (03-16-21 @ 18:05)  HR: 87 (03-17-21 @ 12:23) (73 - 94)  BP: 126/82 (03-17-21 @ 12:23) (118/71 - 143/85)  RR: 18 (03-17-21 @ 12:23) (18 - 20)  SpO2: 99% (03-17-21 @ 12:23) (92% - 99%)  Wt(kg): --  I&O's Summary    16 Mar 2021 07:01  -  17 Mar 2021 07:00  --------------------------------------------------------  IN: 870 mL / OUT: 1700 mL / NET: -830 mL    17 Mar 2021 07:01  -  17 Mar 2021 17:41  --------------------------------------------------------  IN: 1052 mL / OUT: 400 mL / NET: 652 mL                                           Appearance: NAD 	  HEENT: Normal oral mucosa, PERRL, EOMI	  Neck: Supple, - JVD   Cardiovascular: Normal S1/S2, No JVD, No murmurs   Respiratory: Decreased breath sounds throughout, No Rales, Rhonchi, Wheezing	  Gastrointestinal:  Soft, Non-tender	  Skin: No rashes, No ecchymoses, No cyanosis  Extremities: Normal range of motion, No clubbing, cyanosis, 1+ pitting edema b/l   Vascular: Peripheral pulses palpable 2+ bilaterally  Neurologic: Non-focal  Psychiatry: A & O x 3, Mood & affect appropriate    	    ECG:  	  RADIOLOGY:   DIAGNOSTIC TESTING:  [X] Echocardiogram: normal LV/RV size and function w/ EF 60%, no valvular disease, no pulmonary hypertension, and no pericardial effusion  [ ] Catheterization:  [ ] Stress Test:    [ ] AMINA 	    LABS:	 	               14.0   7.55  )-----------( 291      ( 17 Mar 2021 08:37 )             43.0     03-17    136  |  91<L>  |  34<H>  ----------------------------<  111<H>  3.9   |  33<H>  |  0.79    Ca    10.3      17 Mar 2021 08:37  Mg     2.1     03-17    TPro  7.6  /  Alb  4.4  /  TBili  0.3  /  DBili  x   /  AST  36  /  ALT  36  /  AlkPhos  113  03-17

## 2021-03-17 NOTE — PROGRESS NOTE ADULT - PROBLEM SELECTOR PLAN 5
History of CAD w/ MI, received stent to LAD in 2001 at [facility]   - continue daily Aspirin 81 mg once daily, Atorvastatin 40 mg once daily, and Toprol XL 25 mg once daily

## 2021-03-17 NOTE — PROGRESS NOTE ADULT - PROBLEM SELECTOR PLAN 10
Home med: Venlafaxine 150 mg BID, confirmed w/ patient and pharmacy (high dose)  - ordered for Venlafaxine 150 mg daily       F: none  E: K> 4, Mg > 2  N: DASH/TLC, fluid restriction <1.5L  VTE PPX: Xarelto  Dispo: pending diuresis     Case d/w Dr. Black

## 2021-03-17 NOTE — PROGRESS NOTE ADULT - PROBLEM SELECTOR PLAN 3
Patient w/ 6/10 chest pressure and tightness on arrival to [facility], now resolved   - EKG with ST depressions in V4-V6  - Trop peaked at 0.05, no longer trending

## 2021-03-17 NOTE — PROGRESS NOTE ADULT - ASSESSMENT
69 y/o female, current smoker (1 PPD x >30 years), POOR HISTORIAN, w/ PMHx CAD (s/p MI w/ PCI in 2001, LAD? at [facility]), chronic diastolic CHF (EF >60% as per echo 12/2020), HTN, HLD, and fibromyalgia who presented to [facility] by EMS on 3/15/2021 endorsing feeling like her "heart was racing" and weakness for the past 18 hours. Per EMS, patient in A-FIB w/ RVR (rates to 150's, however strip not present on arrival to [facility]). Patient received Cardizem 20 mg IVP x 1 and converted to NSR, rates 80's on arrival to [facility]. Patient also found to be hypokalemic w/ K 2.0. Patient transferred to [facility] for further management of A-Fib w/ RVR and hypokalemia, subsequently found to be in acute diastolic CHF on arrival to [facility]. Patient's potassium was repleted and K 4.0 on 03/16/2021, started on Spironolactone 25 mg daily for potassium sparing diuretic, initiation on Xarelto 20 mg daily, and EP was consulted. Echocardiogram from 03/15/2021 showed normal LV/RV size and function w/ EF 60%, no valvular disease, no pulmonary hypertension, and no pericardial effusion. EP recommended patient be continued on BB and NOAC and follow up as an outpatient for further management. Patient was restarted on Lasix 40 mg IV BID on 03/17/2021 now that K has normalized and remained stable.

## 2021-03-17 NOTE — PROVIDER CONTACT NOTE (OTHER) - ASSESSMENT
Pt LE very warm, more warm then the rest of extremities. LE reddened. +1 pulses B/L DP/PT. Pt c/o pain with mild palpation

## 2021-03-17 NOTE — PROVIDER CONTACT NOTE (OTHER) - SITUATION
Called to bedside for c/o severe post nasal drip.
pt came in for CHF and AFib- r/t low potassium pt IV diuretic has been on hold

## 2021-03-17 NOTE — PROGRESS NOTE ADULT - PROBLEM SELECTOR PLAN 8
RESOLVED; Hyponatremic to 129 on admission  - stable   - ordered for Spironolactone 25 mg daily and Lasix 40 mg IV BID

## 2021-03-17 NOTE — PROGRESS NOTE ADULT - PROBLEM SELECTOR PLAN 1
On admit, patient with 2+ B/L LE edema, unable to lay flat, sleeps on recliner at home  - CXR with mild pulmonary vascular congestion, satting 98% on RA   - EKG: NSR @ 83 bpm, mild ST depressions in leads V4-V6  - Troponin peaked at 0.05, no longer trending   - Follows with Dr. Arnold whom said patient had an echo back in 12/2020 with EF of 60% and trace MR  - Repeat Echocardiogram normal as above    - Home meds include Metolazone 5 mg once daily, Furosemide 80 mg once daily, Toprol XL 50 mg once daily, and Losartan 50 mg once daily  - continue Spironolactone 25 mg daily and restarted Lasix 40 mg IV BID (monitor K)  - 1+ pitting edema b/l w/ intermittent redness, depends on position, no concern for cellulitis at this time   - B/L LE Doppler showed no DVT   - Core measures, 1.5L fluid restriction, daily weight, strict I's and O's

## 2021-03-17 NOTE — PROGRESS NOTE ADULT - PROBLEM SELECTOR PLAN 2
Patient found to be in A-Fib w/ RVR and rates to 150's en route to [facility], was given Cardizem 20 mg IV x 1 and broke to NSR w/ rates in 80's  - Patient w/o history of A-Fib, but does report an "arrythmia in which she gives herself carotid massages for" (has not occurred for many years)  - Currently in NSR   - TSH WNL   - Continue Toprol XL 25 mg once daily   - Continue to monitor on tele  - EP recommended BB and NOAC w/ outpatient follow up

## 2021-03-17 NOTE — PROGRESS NOTE ADULT - PROBLEM SELECTOR PLAN 7
SBP's ranging 100's to 130's   - Home meds include Toprol XL 50 mg, Lasix 80 mg once daily, and Losartan 50 mg once daily  - ordered for Spironolactone 25 mg daily and Lasix 40 mg IV BID   - continue Toprol XL 25 mg once daily

## 2021-03-18 LAB
ALBUMIN SERPL ELPH-MCNC: 4.2 G/DL — SIGNIFICANT CHANGE UP (ref 3.3–5)
ALP SERPL-CCNC: 113 U/L — SIGNIFICANT CHANGE UP (ref 40–120)
ALT FLD-CCNC: 38 U/L — SIGNIFICANT CHANGE UP (ref 10–45)
ANION GAP SERPL CALC-SCNC: 13 MMOL/L — SIGNIFICANT CHANGE UP (ref 5–17)
AST SERPL-CCNC: 41 U/L — HIGH (ref 10–40)
BASOPHILS # BLD AUTO: 0.07 K/UL — SIGNIFICANT CHANGE UP (ref 0–0.2)
BASOPHILS NFR BLD AUTO: 0.8 % — SIGNIFICANT CHANGE UP (ref 0–2)
BILIRUB SERPL-MCNC: 0.4 MG/DL — SIGNIFICANT CHANGE UP (ref 0.2–1.2)
BUN SERPL-MCNC: 25 MG/DL — HIGH (ref 7–23)
CALCIUM SERPL-MCNC: 9.8 MG/DL — SIGNIFICANT CHANGE UP (ref 8.4–10.5)
CHLORIDE SERPL-SCNC: 90 MMOL/L — LOW (ref 96–108)
CO2 SERPL-SCNC: 31 MMOL/L — SIGNIFICANT CHANGE UP (ref 22–31)
CREAT SERPL-MCNC: 0.87 MG/DL — SIGNIFICANT CHANGE UP (ref 0.5–1.3)
EOSINOPHIL # BLD AUTO: 0.3 K/UL — SIGNIFICANT CHANGE UP (ref 0–0.5)
EOSINOPHIL NFR BLD AUTO: 3.6 % — SIGNIFICANT CHANGE UP (ref 0–6)
GLUCOSE BLDC GLUCOMTR-MCNC: 124 MG/DL — HIGH (ref 70–99)
GLUCOSE BLDC GLUCOMTR-MCNC: 147 MG/DL — HIGH (ref 70–99)
GLUCOSE SERPL-MCNC: 105 MG/DL — HIGH (ref 70–99)
HCT VFR BLD CALC: 46.2 % — HIGH (ref 34.5–45)
HGB BLD-MCNC: 14.7 G/DL — SIGNIFICANT CHANGE UP (ref 11.5–15.5)
IMM GRANULOCYTES NFR BLD AUTO: 0.7 % — SIGNIFICANT CHANGE UP (ref 0–1.5)
LYMPHOCYTES # BLD AUTO: 2.19 K/UL — SIGNIFICANT CHANGE UP (ref 1–3.3)
LYMPHOCYTES # BLD AUTO: 26.1 % — SIGNIFICANT CHANGE UP (ref 13–44)
MAGNESIUM SERPL-MCNC: 1.8 MG/DL — SIGNIFICANT CHANGE UP (ref 1.6–2.6)
MCHC RBC-ENTMCNC: 27.6 PG — SIGNIFICANT CHANGE UP (ref 27–34)
MCHC RBC-ENTMCNC: 31.8 GM/DL — LOW (ref 32–36)
MCV RBC AUTO: 86.7 FL — SIGNIFICANT CHANGE UP (ref 80–100)
MONOCYTES # BLD AUTO: 0.82 K/UL — SIGNIFICANT CHANGE UP (ref 0–0.9)
MONOCYTES NFR BLD AUTO: 9.8 % — SIGNIFICANT CHANGE UP (ref 2–14)
NEUTROPHILS # BLD AUTO: 4.96 K/UL — SIGNIFICANT CHANGE UP (ref 1.8–7.4)
NEUTROPHILS NFR BLD AUTO: 59 % — SIGNIFICANT CHANGE UP (ref 43–77)
NRBC # BLD: 0 /100 WBCS — SIGNIFICANT CHANGE UP (ref 0–0)
PLATELET # BLD AUTO: 295 K/UL — SIGNIFICANT CHANGE UP (ref 150–400)
POTASSIUM SERPL-MCNC: 3.8 MMOL/L — SIGNIFICANT CHANGE UP (ref 3.5–5.3)
POTASSIUM SERPL-SCNC: 3.8 MMOL/L — SIGNIFICANT CHANGE UP (ref 3.5–5.3)
PROT SERPL-MCNC: 7.8 G/DL — SIGNIFICANT CHANGE UP (ref 6–8.3)
RBC # BLD: 5.33 M/UL — HIGH (ref 3.8–5.2)
RBC # FLD: 15.9 % — HIGH (ref 10.3–14.5)
SODIUM SERPL-SCNC: 134 MMOL/L — LOW (ref 135–145)
WBC # BLD: 8.4 K/UL — SIGNIFICANT CHANGE UP (ref 3.8–10.5)
WBC # FLD AUTO: 8.4 K/UL — SIGNIFICANT CHANGE UP (ref 3.8–10.5)

## 2021-03-18 PROCEDURE — 99232 SBSQ HOSP IP/OBS MODERATE 35: CPT

## 2021-03-18 RX ORDER — POTASSIUM CHLORIDE 20 MEQ
40 PACKET (EA) ORAL ONCE
Refills: 0 | Status: COMPLETED | OUTPATIENT
Start: 2021-03-18 | End: 2021-03-18

## 2021-03-18 RX ORDER — DIPHENHYDRAMINE HCL 50 MG
25 CAPSULE ORAL ONCE
Refills: 0 | Status: COMPLETED | OUTPATIENT
Start: 2021-03-18 | End: 2021-03-18

## 2021-03-18 RX ORDER — MAGNESIUM OXIDE 400 MG ORAL TABLET 241.3 MG
400 TABLET ORAL ONCE
Refills: 0 | Status: COMPLETED | OUTPATIENT
Start: 2021-03-18 | End: 2021-03-18

## 2021-03-18 RX ORDER — LIDOCAINE 4 G/100G
1 CREAM TOPICAL DAILY
Refills: 0 | Status: DISCONTINUED | OUTPATIENT
Start: 2021-03-18 | End: 2021-03-20

## 2021-03-18 RX ORDER — DIAZEPAM 5 MG
2 TABLET ORAL ONCE
Refills: 0 | Status: DISCONTINUED | OUTPATIENT
Start: 2021-03-18 | End: 2021-03-19

## 2021-03-18 RX ORDER — LORATADINE 10 MG/1
10 TABLET ORAL DAILY
Refills: 0 | Status: DISCONTINUED | OUTPATIENT
Start: 2021-03-18 | End: 2021-03-23

## 2021-03-18 RX ADMIN — Medication 650 MILLIGRAM(S): at 12:30

## 2021-03-18 RX ADMIN — ATORVASTATIN CALCIUM 40 MILLIGRAM(S): 80 TABLET, FILM COATED ORAL at 21:41

## 2021-03-18 RX ADMIN — Medication 650 MILLIGRAM(S): at 11:30

## 2021-03-18 RX ADMIN — Medication 650 MILLIGRAM(S): at 01:47

## 2021-03-18 RX ADMIN — SPIRONOLACTONE 25 MILLIGRAM(S): 25 TABLET, FILM COATED ORAL at 06:08

## 2021-03-18 RX ADMIN — LIDOCAINE 1 PATCH: 4 CREAM TOPICAL at 19:00

## 2021-03-18 RX ADMIN — Medication 650 MILLIGRAM(S): at 02:30

## 2021-03-18 RX ADMIN — MAGNESIUM OXIDE 400 MG ORAL TABLET 400 MILLIGRAM(S): 241.3 TABLET ORAL at 11:30

## 2021-03-18 RX ADMIN — Medication 81 MILLIGRAM(S): at 11:30

## 2021-03-18 RX ADMIN — Medication 150 MILLIGRAM(S): at 11:30

## 2021-03-18 RX ADMIN — RIVAROXABAN 20 MILLIGRAM(S): KIT at 17:32

## 2021-03-18 RX ADMIN — LIDOCAINE 1 PATCH: 4 CREAM TOPICAL at 11:30

## 2021-03-18 RX ADMIN — Medication 25 MILLIGRAM(S): at 01:47

## 2021-03-18 RX ADMIN — Medication 40 MILLIEQUIVALENT(S): at 11:29

## 2021-03-18 RX ADMIN — Medication 650 MILLIGRAM(S): at 18:30

## 2021-03-18 RX ADMIN — Medication 25 MILLIGRAM(S): at 21:40

## 2021-03-18 RX ADMIN — Medication 40 MILLIGRAM(S): at 17:32

## 2021-03-18 RX ADMIN — Medication 25 MILLIGRAM(S): at 06:08

## 2021-03-18 RX ADMIN — Medication 40 MILLIGRAM(S): at 06:08

## 2021-03-18 RX ADMIN — Medication 650 MILLIGRAM(S): at 17:32

## 2021-03-18 RX ADMIN — LORATADINE 10 MILLIGRAM(S): 10 TABLET ORAL at 11:30

## 2021-03-18 NOTE — PROGRESS NOTE ADULT - PROBLEM SELECTOR PLAN 6
RESOLVED; BUN/Cr elevated to 71/1.51 on admission  -remains normal RESOLVED; BUN/Cr elevated to 71/1.51 on admission  -remains normal    # Hepatitis C:  Reactive.  Dr. Black made aware, recs pt to f/u with outpt GI for further eval  -F/U CMP : RESOLVED; BUN/Cr elevated to 71/1.51 on admission, Cr 0.98  -Monitor daily BUN/Cr    # Hepatitis C:  Reactive.  Dr. Black made aware, recs pt to f/u with outpt GI for further eval  -LFT WNL

## 2021-03-18 NOTE — PROGRESS NOTE ADULT - PROBLEM SELECTOR PLAN 5
History of CAD w/ MI, received stent to LAD in 2001 at [facility]   - continue daily Aspirin 81 mg once daily, Atorvastatin 40 mg once daily, and Toprol XL 25 mg once daily History of CAD w/ MI, received stent to LAD in 2001 at [facility]   - continue daily Aspirin 81 mg once daily, Atorvastatin 40 mg once daily, and Toprol XL 25 mg once daily.

## 2021-03-18 NOTE — PHYSICAL THERAPY INITIAL EVALUATION ADULT - ADDITIONAL COMMENTS
was independent at home however reported ambulating tolerance ~ 3 minutes due to pain limitation, able to negotiate stairs daily. Groceries via delivery. She has a cane however reported not using it.

## 2021-03-18 NOTE — PROGRESS NOTE ADULT - PROBLEM SELECTOR PLAN 10
Home med: Venlafaxine 150 mg BID, confirmed w/ patient and pharmacy (high dose)  -ordered for Venlafaxine 150 mg daily       F: none  E: K> 4, Mg > 2  N: DASH/TLC, fluid restriction <1.5L  VTE PPX: Xarelto  Dispo: pending diuresis     Case d/w Dr. Black Problem: Fibromyalgia. Plan; Home med: Venlafaxine 150 mg BID, confirmed w/ patient and pharmacy (high dose)  -Venlafaxine increased to 300mg daily   F: none  E: K> 4, Mg > 2  N: DASH/TLC, fluid restriction <1.5L  VTE PPX: Xarelto  Dispo: pending diuresis     Case d/w Dr. Black

## 2021-03-18 NOTE — PHYSICAL THERAPY INITIAL EVALUATION ADULT - PERTINENT HX OF CURRENT PROBLEM, REHAB EVAL
70 year old female presented to [facility] by EMS on 3/15/2021 endorsing feeling like her "heart was racing" and weakness for the past 18 hours.  Patient received Cardizem 20 mg IVP x 1 and converted to NSR, rates 80s on arrival to [facility]. Patient also found to be hypokalemic (K:2.0). Patient transferred to [facility] for further management afib with RVR and hypokalemia, subsequently found to be in acute diastolic CHF.

## 2021-03-18 NOTE — PROGRESS NOTE ADULT - SUBJECTIVE AND OBJECTIVE BOX
S: Pt seen and examined bedside, sitting comfortable. found NAD, HD stable, denies C/P, SOB, N/V, dizziness, palpitations, diaphoresis or any other complaints.      12 Point ROS otherwise negative except as per HPI/subjective.     O: Vital Signs Last 24 Hrs  T(C): 36.3 (18 Mar 2021 10:45), Max: 36.8 (17 Mar 2021 21:21)  T(F): 97.4 (18 Mar 2021 10:45), Max: 98.3 (17 Mar 2021 21:21)  HR: 100 (18 Mar 2021 11:15) (73 - 100)  BP: 137/87 (18 Mar 2021 11:15) (108/65 - 137/87)  BP(mean): 100 (18 Mar 2021 06:06) (95 - 100)  RR: 19 (18 Mar 2021 09:55) (18 - 20)  SpO2: 96% (18 Mar 2021 11:15) (92% - 97%)    PHYSICAL EXAM:  GEN: NAD  HEENT: Supple, no JVD b/L  PULM:  CTA B/L, no RRW B/L  CARD:  RRR, S1 and S2   ABD: +BS, NT, soft/ND	  EXT: B/L +1 LE swelling, B/L redness   NEURO: A+Ox3, no focal deficit  PSYCH: Mood Appropriate    LABS:                        14.7   8.40  )-----------( 295      ( 18 Mar 2021 08:04 )             46.2         134<L>  |  90<L>  |  25<H>  ----------------------------<  105<H>  3.8   |  31  |  0.87    Ca    9.8      18 Mar 2021 08:04  Mg     1.8         TPro  7.8  /  Alb  4.2  /  TBili  0.4  /  DBili  x   /  AST  41<H>  /  ALT  38  /  AlkPhos  113        Troponin T, Serum: 0.03 ng/mL (21 @ 02:30)  Troponin T, Serum: 0.05 ng/mL (03-15-21 @ 15:45)  Troponin T, Serum: 0.05 ng/mL (03-15-21 @ 10:41)  Troponin I, Serum: 0.161 ng/mL (03-15-21 @ 06:36)       @ 07:01  -   @ 07:00  --------------------------------------------------------  IN: 1292 mL / OUT: 1600 mL / NET: -308 mL     @ 07:01  -   @ 16:02  --------------------------------------------------------  IN: 240 mL / OUT: 800 mL / NET: -560 mL      Daily Weight in k (18 Mar 2021 05:05)

## 2021-03-18 NOTE — PROGRESS NOTE ADULT - PROBLEM SELECTOR PLAN 2
Patient found to be in A-Fib w/ RVR and rates to 150's en route to [facility], was given Cardizem 20 mg IV x 1, converted SR w/ rates in 80's  - Patient w/o history of A-Fib, but does report an "arrythmia in which she gives herself carotid massages for" (has not occurred for many years)  - Currently in NSR   - TSH WNL   - Started on Xarelto 20mg daily with dinner  - Continue Toprol XL 25 mg once daily   - Continue to monitor on tele  - EP recommended BB and NOAC w/ outpatient follow up : Patient found to be in A-Fib w/ RVR and rates to 150's en route to [facility], was given Cardizem 20 mg IV x 1, converted SR w/ rates in 80's  - Patient w/o history of A-Fib, but does report an "arrythmia in which she gives herself carotid massages for" (has not occurred for many years)  - Currently in NSR   - TSH WNL   - Started on Xarelto 20mg daily with dinner  - Continue Toprol XL 25 mg once daily   - Continue to monitor on tele  - EP recommended BB and NOAC w/ outpatient follow up.

## 2021-03-18 NOTE — PROGRESS NOTE ADULT - PROBLEM SELECTOR PLAN 1
On admit, patient with 2+ B/L LE edema, unable to lay flat, sleeps on recliner at home  - CXR with mild pulmonary vascular congestion   - EKG: NSR @ 83 bpm, mild ST depressions in leads V4-V6  - Troponin peaked at 0.05, no longer trending   - Follows with Dr. Arnold whom said patient had an echo back in 12/2020 with EF of 60% and trace MR  - Repeat Echocardiogram 3/15:  Normal study     - Home meds include Metolazone 5 mg once daily, Furosemide 80 mg once daily, Toprol XL 50 mg once daily, and Losartan 50 mg once daily  - continue Toprol XL, Spironolactone 25 mg daily, restarted Lasix 40 mg IV BID 3/17, consider transition to PO Lasix when closer to euvolemia  - Lungs with mild bibasilar rales, satting 96 % 2L NC, 1+ pitting edema b/l w/ intermittent redness, depends on position, no concern for cellulitis at this time   -B/L LE Doppler showed no DVT   -OOB to chair for medical optimization.   -Monitor BUN/Cr and electrolytes  -Core measures, 1.5L fluid restriction, daily weight, strict I's and O's,  x 24h On admit, patient with 2+ B/L LE edema, unable to lay flat, sleeps on recliner at home  - CXR with mild pulmonary vascular congestion   - EKG: NSR @ 83 bpm, mild ST depressions in leads V4-V6  - Troponin peaked at 0.05, no longer trending   - Follows with Dr. Arnold whom said patient had an echo back in 12/2020 with EF of 60% and trace MR  - Repeat Echocardiogram 3/15:  Normal study     - Home meds include Metolazone 5 mg once daily, Furosemide 80 mg once daily, Toprol XL 50 mg once daily, and Losartan 50 mg once daily  - continue Toprol XL, Spironolactone 25 mg daily, restarted Lasix 40 mg IV BID 3/17, consider transition to PO Lasix when closer to euvolemia  - Lungs with mild bibasilar rales, satting 96 % 2L NC, 1+ pitting edema b/l w/ intermittent redness, depends on position, no concern for cellulitis at this time   -B/L LE Doppler showed no DVT   -CPAP D/C in the setting of non-compliance  -OOB to chair for medical optimization.   -Monitor BUN/Cr and electrolytes  -Core measures, 1.5L fluid restriction, daily weight, strict I's and O's,  x 24h Acute on chronic diastolic HF (heart failure).  Plan: On admit, patient with 2+ B/L LE edema, unable to lay flat, sleeps on recliner at home  -Pt now with mild bibasilar rales, satting 95-97% 2L NC, 1+ pitting edema b/l w/ intermittent redness, depends on position, no concern for cellulitis at this time  - CXR with mild pulmonary vascular congestion   - EKG: NSR @ 83 bpm, mild ST depressions in leads V4-V6  - Troponin peaked at 0.05, no longer trending   -Repeat Echocardiogram 3/15: EF 60%, no valvulopathies  -B/L LE Doppler showed no DVT  - Follows with Dr. Arnold whom said patient had an echo back in 12/2020 with EF of 60% and trace MR  - Home meds include Metolazone 5 mg daily, Furosemide 80 mg daily, Toprol XL 50 mg daily, and Losartan 50 mg daily  - continue Toprol XL, Spironolactone 25 mg daily, restarted Lasix 40 mg IV BID 3/17, consider transition to PO Lasix when closer to euvolemia  -CPAP D/C in the setting of non-compliance  -OOB to chair for medical optimization.   -Monitor BUN/Cr and electrolytes  -Core measures, 1.5L fluid restriction, daily weight, strict I's and O's

## 2021-03-18 NOTE — PROGRESS NOTE ADULT - PROBLEM SELECTOR PLAN 3
Patient w/ 6/10 chest pressure and tightness on arrival to [facility], now resolved   - EKG with ST depressions in V4-V6  - Trop peaked at 0.05, no longer trending  -c/w telemetry monitoring : Patient w/ 6/10 chest pressure and tightness on arrival to [facility], now resolved   - EKG with ST depressions in V4-V6  - Trop peaked at 0.05, no longer trending  -c/w telemetry monitoring.

## 2021-03-18 NOTE — PROGRESS NOTE ADULT - PROBLEM SELECTOR PLAN 7
SBP's ranging 130's   - Home meds include Toprol XL 50 mg, Lasix 80 mg once daily, and Losartan 50 mg once daily  - c/w Toprol XL 25mg daily, Spironolactone 25 mg daily and Lasix 40 mg IV BID SBP's ranging 130's   SBP's ranging 120 - 130's   - Home meds include Toprol XL 50 mg, Lasix 80 mg once daily, and Losartan 50 mg once daily  - c/w Toprol XL 25mg daily, Spironolactone 25 mg daily and Lasix 40 mg IV BID

## 2021-03-18 NOTE — PROGRESS NOTE ADULT - PROBLEM SELECTOR PLAN 4
RESOLVED   - continue to monitor K (daily BMP) Hypokalemia on admit, resolved, now with K+ 3.8, repleted with KCL 40 meq x 1 dose PO  -Mg 1.8, repleted with MgOx 400mg PO x 1  - continue to monitor K  and Mg (daily BMP) : Hypokalemia on admit, resolved, now resolved.    -Mg 1.8, repleted with MgOx 400mg PO x 1  - continue to monitor electrolytes daily

## 2021-03-18 NOTE — PROGRESS NOTE ADULT - ASSESSMENT
71 y/o female, current smoker (1 PPD x >30 years), POOR HISTORIAN, w/ PMHx CAD (s/p MI w/ PCI in 2001, LAD? at [facility]), chronic diastolic CHF (EF >60% as per echo 12/2020), HTN, HLD, and fibromyalgia who presented to [facility] by EMS on 3/15/2021 endorsing feeling like her "heart was racing" and weakness for the past 18 hours. Per EMS, patient in A-FIB w/ RVR (rates to 150's, however strip not present on arrival to [facility]). Patient received Cardizem 20 mg IVP x 1 and converted to NSR, rates 80's on arrival to [facility]. Patient also found to be hypokalemic w/ K 2.0. Patient transferred to [facility] for further management of A-Fib w/ RVR and hypokalemia, subsequently found to be in acute diastolic CHF on arrival to [facility]. Patient's potassium was repleted and K 4.0 on 03/16/2021, started on Spironolactone 25 mg daily for potassium sparing diuretic, initiation on Xarelto 20 mg daily, and EP was consulted. Echocardiogram from 03/15/2021 showed normal LV/RV size and function w/ EF 60%, no valvular disease, no pulmonary hypertension, and no pericardial effusion. EP recommended patient be continued on BB and NOAC and follow up as an outpatient for further management. Patient was restarted on Lasix 40 mg IV BID on 03/17/202, will transition to PO Lasix when more euvolemic, hypokalemia resolved, Home with [facility] (pending stairs eval) vs [facility].      69 y/o female, current smoker (1 PPD x >30 years), POOR HISTORIAN, w/ PMHx CAD (s/p MI w/ PCI in 2001, LAD? at [facility]), chronic diastolic CHF (EF >60% as per echo 12/2020), HTN, HLD, and fibromyalgia who presented to [facility] by EMS on 3/15/2021 endorsing feeling like her "heart was racing" and weakness for the past 18 hours. Per EMS, patient in A-FIB w/ RVR (rates to 150's, however strip not present on arrival to [facility]). Patient received Cardizem 20 mg IVP x 1 and converted to NSR, rates 80's on arrival to [facility]. Patient also found to be hypokalemic w/ K 2.0. Patient transferred to [facility] for further management of A-Fib w/ RVR and hypokalemia, subsequently found to be in acute diastolic CHF on arrival to [facility]. Patient's potassium was repleted and K 4.0 on 03/16/2021, started on Spironolactone 25 mg daily for potassium sparing diuretic, initiation on Xarelto 20 mg daily, and EP was consulted. Echocardiogram from 03/15/2021 showed normal LV/RV size and function w/ EF 60%, no valvular disease, no pulmonary hypertension, and no pericardial effusion. EP recommended patient be continued on BB and NOAC and follow up as an outpatient for further management. Patient was restarted on Lasix 40 mg IV BID on 03/17/202, will transition to PO Lasix when more euvolemic, hypokalemia resolved, Home with \[facility]\"" (pending stairs eval) vs [facility].

## 2021-03-18 NOTE — PHYSICAL THERAPY INITIAL EVALUATION ADULT - IMPAIRMENTS CONTRIBUTING IMPAIRED BED MOBILITY, REHAB EVAL
patient raised head of bed, increased time to perform however no assist needed from therapist, dangle at edge of bed independently/pain/impaired postural control/decreased strength

## 2021-03-18 NOTE — PROGRESS NOTE ADULT - PROBLEM SELECTOR PLAN 8
RESOLVED; Hyponatremic to 129 on admission  - stable   - ordered for Spironolactone 25 mg daily and Lasix 40 mg IV BID Hyponatremic to 129 on admission, resolved, now noted with Na 134 on 3/18-3/19, continue to monitor  - ordered for Spironolactone 25 mg daily and Lasix 40 mg IV BID

## 2021-03-19 LAB
ALBUMIN SERPL ELPH-MCNC: 4.4 G/DL — SIGNIFICANT CHANGE UP (ref 3.3–5)
ALP SERPL-CCNC: 118 U/L — SIGNIFICANT CHANGE UP (ref 40–120)
ALT FLD-CCNC: 39 U/L — SIGNIFICANT CHANGE UP (ref 10–45)
ANION GAP SERPL CALC-SCNC: 9 MMOL/L — SIGNIFICANT CHANGE UP (ref 5–17)
AST SERPL-CCNC: 32 U/L — SIGNIFICANT CHANGE UP (ref 10–40)
BILIRUB SERPL-MCNC: 0.4 MG/DL — SIGNIFICANT CHANGE UP (ref 0.2–1.2)
BUN SERPL-MCNC: 26 MG/DL — HIGH (ref 7–23)
CALCIUM SERPL-MCNC: 9.7 MG/DL — SIGNIFICANT CHANGE UP (ref 8.4–10.5)
CHLORIDE SERPL-SCNC: 89 MMOL/L — LOW (ref 96–108)
CO2 SERPL-SCNC: 36 MMOL/L — HIGH (ref 22–31)
CREAT SERPL-MCNC: 0.98 MG/DL — SIGNIFICANT CHANGE UP (ref 0.5–1.3)
GLUCOSE SERPL-MCNC: 116 MG/DL — HIGH (ref 70–99)
HCT VFR BLD CALC: 45.3 % — HIGH (ref 34.5–45)
HGB BLD-MCNC: 14.3 G/DL — SIGNIFICANT CHANGE UP (ref 11.5–15.5)
MAGNESIUM SERPL-MCNC: 1.8 MG/DL — SIGNIFICANT CHANGE UP (ref 1.6–2.6)
MCHC RBC-ENTMCNC: 27.7 PG — SIGNIFICANT CHANGE UP (ref 27–34)
MCHC RBC-ENTMCNC: 31.6 GM/DL — LOW (ref 32–36)
MCV RBC AUTO: 87.8 FL — SIGNIFICANT CHANGE UP (ref 80–100)
NRBC # BLD: 0 /100 WBCS — SIGNIFICANT CHANGE UP (ref 0–0)
PLATELET # BLD AUTO: 307 K/UL — SIGNIFICANT CHANGE UP (ref 150–400)
POTASSIUM SERPL-MCNC: 4.1 MMOL/L — SIGNIFICANT CHANGE UP (ref 3.5–5.3)
POTASSIUM SERPL-SCNC: 4.1 MMOL/L — SIGNIFICANT CHANGE UP (ref 3.5–5.3)
PROT SERPL-MCNC: 8 G/DL — SIGNIFICANT CHANGE UP (ref 6–8.3)
RBC # BLD: 5.16 M/UL — SIGNIFICANT CHANGE UP (ref 3.8–5.2)
RBC # FLD: 15.8 % — HIGH (ref 10.3–14.5)
SODIUM SERPL-SCNC: 134 MMOL/L — LOW (ref 135–145)
WBC # BLD: 8.51 K/UL — SIGNIFICANT CHANGE UP (ref 3.8–10.5)
WBC # FLD AUTO: 8.51 K/UL — SIGNIFICANT CHANGE UP (ref 3.8–10.5)

## 2021-03-19 PROCEDURE — 99232 SBSQ HOSP IP/OBS MODERATE 35: CPT

## 2021-03-19 RX ORDER — MAGNESIUM OXIDE 400 MG ORAL TABLET 241.3 MG
400 TABLET ORAL ONCE
Refills: 0 | Status: COMPLETED | OUTPATIENT
Start: 2021-03-19 | End: 2021-03-19

## 2021-03-19 RX ORDER — VENLAFAXINE HCL 75 MG
300 CAPSULE, EXT RELEASE 24 HR ORAL DAILY
Refills: 0 | Status: DISCONTINUED | OUTPATIENT
Start: 2021-03-19 | End: 2021-03-23

## 2021-03-19 RX ORDER — ZALEPLON 10 MG
5 CAPSULE ORAL AT BEDTIME
Refills: 0 | Status: DISCONTINUED | OUTPATIENT
Start: 2021-03-19 | End: 2021-03-23

## 2021-03-19 RX ADMIN — BENZOCAINE AND MENTHOL 1 LOZENGE: 5; 1 LIQUID ORAL at 01:14

## 2021-03-19 RX ADMIN — ATORVASTATIN CALCIUM 40 MILLIGRAM(S): 80 TABLET, FILM COATED ORAL at 21:42

## 2021-03-19 RX ADMIN — Medication 650 MILLIGRAM(S): at 07:25

## 2021-03-19 RX ADMIN — Medication 650 MILLIGRAM(S): at 08:15

## 2021-03-19 RX ADMIN — Medication 40 MILLIGRAM(S): at 17:56

## 2021-03-19 RX ADMIN — Medication 650 MILLIGRAM(S): at 13:58

## 2021-03-19 RX ADMIN — Medication 300 MILLIGRAM(S): at 13:53

## 2021-03-19 RX ADMIN — Medication 5 MILLIGRAM(S): at 21:42

## 2021-03-19 RX ADMIN — Medication 650 MILLIGRAM(S): at 14:50

## 2021-03-19 RX ADMIN — RIVAROXABAN 20 MILLIGRAM(S): KIT at 17:56

## 2021-03-19 RX ADMIN — Medication 25 MILLIGRAM(S): at 05:29

## 2021-03-19 RX ADMIN — LIDOCAINE 1 PATCH: 4 CREAM TOPICAL at 17:59

## 2021-03-19 RX ADMIN — Medication 81 MILLIGRAM(S): at 11:15

## 2021-03-19 RX ADMIN — Medication 650 MILLIGRAM(S): at 22:36

## 2021-03-19 RX ADMIN — Medication 2 MILLIGRAM(S): at 00:05

## 2021-03-19 RX ADMIN — Medication 650 MILLIGRAM(S): at 01:06

## 2021-03-19 RX ADMIN — SPIRONOLACTONE 25 MILLIGRAM(S): 25 TABLET, FILM COATED ORAL at 05:29

## 2021-03-19 RX ADMIN — Medication 40 MILLIGRAM(S): at 05:29

## 2021-03-19 RX ADMIN — BENZOCAINE AND MENTHOL 1 LOZENGE: 5; 1 LIQUID ORAL at 12:29

## 2021-03-19 RX ADMIN — Medication 650 MILLIGRAM(S): at 00:05

## 2021-03-19 RX ADMIN — MAGNESIUM OXIDE 400 MG ORAL TABLET 400 MILLIGRAM(S): 241.3 TABLET ORAL at 13:53

## 2021-03-19 RX ADMIN — LIDOCAINE 1 PATCH: 4 CREAM TOPICAL at 10:35

## 2021-03-19 RX ADMIN — Medication 650 MILLIGRAM(S): at 21:41

## 2021-03-19 RX ADMIN — LORATADINE 10 MILLIGRAM(S): 10 TABLET ORAL at 11:15

## 2021-03-19 RX ADMIN — BENZOCAINE AND MENTHOL 1 LOZENGE: 5; 1 LIQUID ORAL at 03:57

## 2021-03-20 LAB
ANION GAP SERPL CALC-SCNC: 13 MMOL/L — SIGNIFICANT CHANGE UP (ref 5–17)
BUN SERPL-MCNC: 28 MG/DL — HIGH (ref 7–23)
CALCIUM SERPL-MCNC: 10 MG/DL — SIGNIFICANT CHANGE UP (ref 8.4–10.5)
CHLORIDE SERPL-SCNC: 91 MMOL/L — LOW (ref 96–108)
CO2 SERPL-SCNC: 32 MMOL/L — HIGH (ref 22–31)
CREAT SERPL-MCNC: 1.07 MG/DL — SIGNIFICANT CHANGE UP (ref 0.5–1.3)
GLUCOSE SERPL-MCNC: 110 MG/DL — HIGH (ref 70–99)
HCT VFR BLD CALC: 45.3 % — HIGH (ref 34.5–45)
HCV RNA FLD QL NAA+PROBE: SIGNIFICANT CHANGE UP
HGB BLD-MCNC: 14.6 G/DL — SIGNIFICANT CHANGE UP (ref 11.5–15.5)
MAGNESIUM SERPL-MCNC: 2.1 MG/DL — SIGNIFICANT CHANGE UP (ref 1.6–2.6)
MCHC RBC-ENTMCNC: 27.9 PG — SIGNIFICANT CHANGE UP (ref 27–34)
MCHC RBC-ENTMCNC: 32.2 GM/DL — SIGNIFICANT CHANGE UP (ref 32–36)
MCV RBC AUTO: 86.5 FL — SIGNIFICANT CHANGE UP (ref 80–100)
NRBC # BLD: 0 /100 WBCS — SIGNIFICANT CHANGE UP (ref 0–0)
PLATELET # BLD AUTO: 304 K/UL — SIGNIFICANT CHANGE UP (ref 150–400)
POTASSIUM SERPL-MCNC: 3.8 MMOL/L — SIGNIFICANT CHANGE UP (ref 3.5–5.3)
POTASSIUM SERPL-SCNC: 3.8 MMOL/L — SIGNIFICANT CHANGE UP (ref 3.5–5.3)
RBC # BLD: 5.24 M/UL — HIGH (ref 3.8–5.2)
RBC # FLD: 15.7 % — HIGH (ref 10.3–14.5)
SODIUM SERPL-SCNC: 136 MMOL/L — SIGNIFICANT CHANGE UP (ref 135–145)
WBC # BLD: 8.51 K/UL — SIGNIFICANT CHANGE UP (ref 3.8–10.5)
WBC # FLD AUTO: 8.51 K/UL — SIGNIFICANT CHANGE UP (ref 3.8–10.5)

## 2021-03-20 PROCEDURE — 99232 SBSQ HOSP IP/OBS MODERATE 35: CPT

## 2021-03-20 PROCEDURE — 71045 X-RAY EXAM CHEST 1 VIEW: CPT | Mod: 26

## 2021-03-20 RX ORDER — BENZOCAINE AND MENTHOL 5; 1 G/100ML; G/100ML
1 LIQUID ORAL EVERY 6 HOURS
Refills: 0 | Status: DISCONTINUED | OUTPATIENT
Start: 2021-03-20 | End: 2021-03-22

## 2021-03-20 RX ORDER — LIDOCAINE 4 G/100G
1 CREAM TOPICAL DAILY
Refills: 0 | Status: DISCONTINUED | OUTPATIENT
Start: 2021-03-20 | End: 2021-03-23

## 2021-03-20 RX ORDER — FLUTICASONE PROPIONATE 50 MCG
1 SPRAY, SUSPENSION NASAL EVERY 12 HOURS
Refills: 0 | Status: DISCONTINUED | OUTPATIENT
Start: 2021-03-20 | End: 2021-03-23

## 2021-03-20 RX ORDER — ACETAMINOPHEN 500 MG
975 TABLET ORAL EVERY 8 HOURS
Refills: 0 | Status: DISCONTINUED | OUTPATIENT
Start: 2021-03-20 | End: 2021-03-23

## 2021-03-20 RX ORDER — SODIUM CHLORIDE 0.65 %
1 AEROSOL, SPRAY (ML) NASAL EVERY 4 HOURS
Refills: 0 | Status: DISCONTINUED | OUTPATIENT
Start: 2021-03-20 | End: 2021-03-23

## 2021-03-20 RX ORDER — POTASSIUM CHLORIDE 20 MEQ
40 PACKET (EA) ORAL ONCE
Refills: 0 | Status: COMPLETED | OUTPATIENT
Start: 2021-03-20 | End: 2021-03-20

## 2021-03-20 RX ADMIN — LIDOCAINE 1 PATCH: 4 CREAM TOPICAL at 17:46

## 2021-03-20 RX ADMIN — Medication 650 MILLIGRAM(S): at 08:53

## 2021-03-20 RX ADMIN — LORATADINE 10 MILLIGRAM(S): 10 TABLET ORAL at 10:37

## 2021-03-20 RX ADMIN — Medication 1 SPRAY(S): at 14:14

## 2021-03-20 RX ADMIN — Medication 81 MILLIGRAM(S): at 11:15

## 2021-03-20 RX ADMIN — Medication 975 MILLIGRAM(S): at 15:10

## 2021-03-20 RX ADMIN — LIDOCAINE 1 PATCH: 4 CREAM TOPICAL at 11:34

## 2021-03-20 RX ADMIN — Medication 40 MILLIGRAM(S): at 17:28

## 2021-03-20 RX ADMIN — Medication 975 MILLIGRAM(S): at 15:46

## 2021-03-20 RX ADMIN — Medication 975 MILLIGRAM(S): at 23:44

## 2021-03-20 RX ADMIN — LIDOCAINE 1 PATCH: 4 CREAM TOPICAL at 22:22

## 2021-03-20 RX ADMIN — RIVAROXABAN 20 MILLIGRAM(S): KIT at 17:29

## 2021-03-20 RX ADMIN — BENZOCAINE AND MENTHOL 1 LOZENGE: 5; 1 LIQUID ORAL at 10:36

## 2021-03-20 RX ADMIN — ATORVASTATIN CALCIUM 40 MILLIGRAM(S): 80 TABLET, FILM COATED ORAL at 21:21

## 2021-03-20 RX ADMIN — Medication 40 MILLIGRAM(S): at 05:56

## 2021-03-20 RX ADMIN — Medication 40 MILLIEQUIVALENT(S): at 15:11

## 2021-03-20 RX ADMIN — SPIRONOLACTONE 25 MILLIGRAM(S): 25 TABLET, FILM COATED ORAL at 05:56

## 2021-03-20 RX ADMIN — Medication 300 MILLIGRAM(S): at 11:15

## 2021-03-20 RX ADMIN — BENZOCAINE AND MENTHOL 1 LOZENGE: 5; 1 LIQUID ORAL at 17:28

## 2021-03-20 RX ADMIN — Medication 25 MILLIGRAM(S): at 05:56

## 2021-03-20 RX ADMIN — Medication 650 MILLIGRAM(S): at 09:56

## 2021-03-20 RX ADMIN — LIDOCAINE 1 PATCH: 4 CREAM TOPICAL at 11:16

## 2021-03-20 RX ADMIN — Medication 5 MILLIGRAM(S): at 21:21

## 2021-03-20 RX ADMIN — BENZOCAINE AND MENTHOL 1 LOZENGE: 5; 1 LIQUID ORAL at 23:44

## 2021-03-20 NOTE — PROGRESS NOTE ADULT - PROBLEM SELECTOR PLAN 2
-Per EMS Patient found to be in A-Fib w/ RVR and rates to 150's en route to [facility], was given Cardizem 20 mg IV x 1, converted SR w/ rates in 80's  - Patient w/o history of A-Fib, but does report an "arrythmia in which she gives herself carotid massages for" (has not occurred for many years)  - Currently in NSR   - TSH WNL   - Started on Xarelto 20mg daily with dinner  - Continue Toprol XL 25 mg once daily   - Continue to monitor on tele  - EP recommended BB and NOAC w/ outpatient follow up.

## 2021-03-20 NOTE — PROGRESS NOTE ADULT - PROBLEM SELECTOR PLAN 10
-Home med: Venlafaxine 150 mg daily, confirmed w/ patient and pharmacy (high dose)  -Venlafaxine increased to 300mg daily   -continue standing tylenol and lidocaine patch  F: none  E: K> 4, Mg > 2  N: DASH/TLC, fluid restriction <1.5L  VTE PPX: Xarelto  Dispo: pending diuresis   PT eval (stairs) pending    Case d/w Dr. Ugarte

## 2021-03-20 NOTE — PROGRESS NOTE ADULT - SUBJECTIVE AND OBJECTIVE BOX
INCOMPLETE    OVERNIGHT EVENTS:  No acute events overnight.    SUBJECTIVE / INTERVAL HPI: Patient seen and examined at bedside.    Denies CP, SOB, dizziness/lightheadedness, palpitations    VITAL SIGNS:  Vital Signs Last 24 Hrs  T(C): 36.6 (20 Mar 2021 13:47), Max: 37.2 (19 Mar 2021 22:14)  T(F): 97.9 (20 Mar 2021 13:47), Max: 98.9 (19 Mar 2021 22:14)  HR: 93 (20 Mar 2021 11:52) (91 - 105)  BP: 120/74 (20 Mar 2021 11:52) (112/79 - 131/73)  BP(mean): --  RR: 18 (20 Mar 2021 11:52) (18 - 19)  SpO2: 98% (20 Mar 2021 11:52) (96% - 98%)      I&O's Summary    19 Mar 2021 07:01  -  20 Mar 2021 07:00  --------------------------------------------------------  IN: 698 mL / OUT: 1850 mL / NET: -1152 mL    20 Mar 2021 07:01  -  20 Mar 2021 13:59  --------------------------------------------------------  IN: 0 mL / OUT: 500 mL / NET: -500 mL          PHYSICAL EXAM:    General: sitting up in bed, NAD  HEENT: conjunctiva clear; MMM  Neck: supple, no JVD  Cardiovascular: RRR, no murmurs  Respiratory: CTA B/L  Gastrointestinal: soft, NT/ND, +BS  Extremities: WWP, no edema or cyanosis  Vascular: Peripheral pulses palpable 2+ bilaterally/ carotid 2+ b/l, no bruits; radial 2+ b/l; femoral 2+ b/l no bruits; DP/PT 2+ b/l  Neurological: AAOx3, no focal deficits    MEDICATIONS:  MEDICATIONS  (STANDING):  acetaminophen   Tablet .. 975 milliGRAM(s) Oral every 8 hours  aspirin enteric coated 81 milliGRAM(s) Oral daily  atorvastatin 40 milliGRAM(s) Oral at bedtime  benzocaine 15 mG/menthol 3.6 mG (Sugar-Free) Lozenge 1 Lozenge Oral every 6 hours  fluticasone propionate 50 MICROgram(s)/spray Nasal Spray 1 Spray(s) Both Nostrils every 12 hours  furosemide   Injectable 40 milliGRAM(s) IV Push two times a day  lidocaine   Patch 1 Patch Transdermal daily  lidocaine   Patch 1 Patch Transdermal daily  loratadine 10 milliGRAM(s) Oral daily  metoprolol succinate ER 25 milliGRAM(s) Oral daily  potassium chloride    Tablet ER 40 milliEquivalent(s) Oral once  rivaroxaban 20 milliGRAM(s) Oral with dinner  spironolactone 25 milliGRAM(s) Oral daily  venlafaxine XR. 300 milliGRAM(s) Oral daily    MEDICATIONS  (PRN):  ipratropium    for Nebulization 500 MICROGram(s) Nebulizer every 6 hours PRN Shortness of Breath and/or Wheezing  sodium chloride 0.65% Nasal 1 Spray(s) Both Nostrils every 4 hours PRN Nasal Congestion  zaleplon 5 milliGRAM(s) Oral at bedtime PRN Insomnia      LABS:                        14.6   8.51  )-----------( 304      ( 20 Mar 2021 07:05 )             45.3       03-20    136  |  91<L>  |  28<H>  ----------------------------<  110<H>  3.8   |  32<H>  |  1.07    Ca    10.0      20 Mar 2021 07:05  Mg     2.1     03-20    TPro  8.0  /  Alb  4.4  /  TBili  0.4  /  DBili  x   /  AST  32  /  ALT  39  /  AlkPhos  118  03-19                TELEMETRY:    RADIOLOGY & ADDITIONAL TESTS: Reviewed. OVERNIGHT EVENTS:  No acute events overnight.    SUBJECTIVE / INTERVAL HPI: Patient seen and examined at bedside. C/o nasal congestion and bilateral shoulder pain    Denies CP, SOB, dizziness/lightheadedness, palpitations    VITAL SIGNS:  Vital Signs Last 24 Hrs  T(C): 36.6 (20 Mar 2021 13:47), Max: 37.2 (19 Mar 2021 22:14)  T(F): 97.9 (20 Mar 2021 13:47), Max: 98.9 (19 Mar 2021 22:14)  HR: 93 (20 Mar 2021 11:52) (91 - 105)  BP: 120/74 (20 Mar 2021 11:52) (112/79 - 131/73)  BP(mean): --  RR: 18 (20 Mar 2021 11:52) (18 - 19)  SpO2: 98% (20 Mar 2021 11:52) (96% - 98%)      I&O's Summary    19 Mar 2021 07:01  -  20 Mar 2021 07:00  --------------------------------------------------------  IN: 698 mL / OUT: 1850 mL / NET: -1152 mL    20 Mar 2021 07:01  -  20 Mar 2021 13:59  --------------------------------------------------------  IN: 0 mL / OUT: 500 mL / NET: -500 mL          PHYSICAL EXAM:    General: sitting up in bed, NAD  HEENT: conjunctiva clear; MMM  Neck: supple, no JVD  Cardiovascular: RRR, no murmurs  Respiratory: CTA B/L  Gastrointestinal: soft, NT/ND, +BS  Extremities: WWP, no edema or cyanosis  Vascular: Peripheral pulses palpable 2+ bilaterally/ carotid 2+ b/l, no bruits; radial 2+ b/l; femoral 2+ b/l no bruits; DP/PT 2+ b/l  Neurological: AAOx3, no focal deficits    MEDICATIONS:  MEDICATIONS  (STANDING):  acetaminophen   Tablet .. 975 milliGRAM(s) Oral every 8 hours  aspirin enteric coated 81 milliGRAM(s) Oral daily  atorvastatin 40 milliGRAM(s) Oral at bedtime  benzocaine 15 mG/menthol 3.6 mG (Sugar-Free) Lozenge 1 Lozenge Oral every 6 hours  fluticasone propionate 50 MICROgram(s)/spray Nasal Spray 1 Spray(s) Both Nostrils every 12 hours  furosemide   Injectable 40 milliGRAM(s) IV Push two times a day  lidocaine   Patch 1 Patch Transdermal daily  lidocaine   Patch 1 Patch Transdermal daily  loratadine 10 milliGRAM(s) Oral daily  metoprolol succinate ER 25 milliGRAM(s) Oral daily  potassium chloride    Tablet ER 40 milliEquivalent(s) Oral once  rivaroxaban 20 milliGRAM(s) Oral with dinner  spironolactone 25 milliGRAM(s) Oral daily  venlafaxine XR. 300 milliGRAM(s) Oral daily    MEDICATIONS  (PRN):  ipratropium    for Nebulization 500 MICROGram(s) Nebulizer every 6 hours PRN Shortness of Breath and/or Wheezing  sodium chloride 0.65% Nasal 1 Spray(s) Both Nostrils every 4 hours PRN Nasal Congestion  zaleplon 5 milliGRAM(s) Oral at bedtime PRN Insomnia      LABS:                        14.6   8.51  )-----------( 304      ( 20 Mar 2021 07:05 )             45.3       03-20    136  |  91<L>  |  28<H>  ----------------------------<  110<H>  3.8   |  32<H>  |  1.07    Ca    10.0      20 Mar 2021 07:05  Mg     2.1     03-20    TPro  8.0  /  Alb  4.4  /  TBili  0.4  /  DBili  x   /  AST  32  /  ALT  39  /  AlkPhos  118  03-19                TELEMETRY:    RADIOLOGY & ADDITIONAL TESTS: Reviewed. OVERNIGHT EVENTS:  No acute events overnight.    SUBJECTIVE / INTERVAL HPI: Patient seen and examined at bedside. C/o nasal congestion and bilateral shoulder pain. No SOB, CP, palpitations, or dizziness.     VITAL SIGNS:  Vital Signs Last 24 Hrs  T(C): 36.6 (20 Mar 2021 13:47), Max: 37.2 (19 Mar 2021 22:14)  T(F): 97.9 (20 Mar 2021 13:47), Max: 98.9 (19 Mar 2021 22:14)  HR: 93 (20 Mar 2021 11:52) (91 - 105)  BP: 120/74 (20 Mar 2021 11:52) (112/79 - 131/73)  RR: 18 (20 Mar 2021 11:52) (18 - 19)  SpO2: 98% (20 Mar 2021 11:52) (96% - 98%)      I&O's Summary    19 Mar 2021 07:01  -  20 Mar 2021 07:00  --------------------------------------------------------  IN: 698 mL / OUT: 1850 mL / NET: -1152 mL    20 Mar 2021 07:01  -  20 Mar 2021 13:59  --------------------------------------------------------  IN: 0 mL / OUT: 500 mL / NET: -500 mL    PHYSICAL EXAM:  General: sitting up in bed, NAD  HEENT: conjunctiva clear; MMM  Neck: supple, no JVD  Cardiovascular: RRR, no murmurs  Respiratory: mild bibasilar crackles  Gastrointestinal: soft, NT/ND, +BS  Extremities: trace LE edema  Neurological: AAOx3, no focal deficits    MEDICATIONS:  MEDICATIONS  (STANDING):  acetaminophen   Tablet .. 975 milliGRAM(s) Oral every 8 hours  aspirin enteric coated 81 milliGRAM(s) Oral daily  atorvastatin 40 milliGRAM(s) Oral at bedtime  benzocaine 15 mG/menthol 3.6 mG (Sugar-Free) Lozenge 1 Lozenge Oral every 6 hours  fluticasone propionate 50 MICROgram(s)/spray Nasal Spray 1 Spray(s) Both Nostrils every 12 hours  furosemide   Injectable 40 milliGRAM(s) IV Push two times a day  lidocaine   Patch 1 Patch Transdermal daily  lidocaine   Patch 1 Patch Transdermal daily  loratadine 10 milliGRAM(s) Oral daily  metoprolol succinate ER 25 milliGRAM(s) Oral daily  potassium chloride    Tablet ER 40 milliEquivalent(s) Oral once  rivaroxaban 20 milliGRAM(s) Oral with dinner  spironolactone 25 milliGRAM(s) Oral daily  venlafaxine XR. 300 milliGRAM(s) Oral daily    MEDICATIONS  (PRN):  ipratropium    for Nebulization 500 MICROGram(s) Nebulizer every 6 hours PRN Shortness of Breath and/or Wheezing  sodium chloride 0.65% Nasal 1 Spray(s) Both Nostrils every 4 hours PRN Nasal Congestion  zaleplon 5 milliGRAM(s) Oral at bedtime PRN Insomnia      LABS:                        14.6   8.51  )-----------( 304      ( 20 Mar 2021 07:05 )             45.3       03-20    136  |  91<L>  |  28<H>  ----------------------------<  110<H>  3.8   |  32<H>  |  1.07    Ca    10.0      20 Mar 2021 07:05  Mg     2.1     03-20    TPro  8.0  /  Alb  4.4  /  TBili  0.4  /  DBili  x   /  AST  32  /  ALT  39  /  AlkPhos  118  03-19      TELEMETRY: sinus rhythm    RADIOLOGY & ADDITIONAL TESTS: Reviewed.

## 2021-03-20 NOTE — PROGRESS NOTE ADULT - PROBLEM SELECTOR PLAN 7
-BP well-controlled  - Home meds include Toprol XL 50 mg, Lasix 80 mg once daily, and Losartan 50 mg once daily  - c/w Toprol XL 25mg daily, Spironolactone 25 mg daily and Lasix 40 mg IV BID

## 2021-03-20 NOTE — PROGRESS NOTE ADULT - PROBLEM SELECTOR PLAN 3
-Patient w/ 6/10 chest pressure and tightness on arrival to [facility], now resolved   - EKG with ST depressions in V4-V6  - Trop peaked at 0.05, no longer trending  -c/w telemetry monitoring.

## 2021-03-20 NOTE — PROGRESS NOTE ADULT - ASSESSMENT
71 y/o female, current smoker (1 PPD x >30 years), POOR HISTORIAN, w/ PMHx CAD (s/p MI w/ PCI in 2001, LAD? at [facility]), chronic diastolic CHF (EF >60% as per echo 12/2020), HTN, HLD, and fibromyalgia who presented to [facility] by EMS on 3/15/2021 endorsing feeling like her "heart was racing" and weakness for 1-2 days, per EMS patient in A-FIB w/ RVR (rates to 150's, however strip not present on arrival to [facility]), received Cardizem 20 mg IVP x1 and converted to NSR, also found to be severely hypokalemic (K 2.0), transferred to [facility] for AF RVR and hypokalemia and found to be volume overloaded consistent with acute on chronic diastolic CHF exacerbation.

## 2021-03-20 NOTE — PROGRESS NOTE ADULT - PROBLEM SELECTOR PLAN 6
-Cr 1.51 on admission, improved to 0.8-1.0    # Hepatitis C:  Reactive.  Dr. Black made aware, recs pt to f/u with outpt GI for further eval  -LFT WNL

## 2021-03-20 NOTE — PROGRESS NOTE ADULT - PROBLEM SELECTOR PLAN 8
Hyponatremic to 129 on admission, resolved, now noted with Na 134 on 3/18-3/19, continue to monitor  - ordered for Spironolactone 25 mg daily and Lasix 40 mg IV BID

## 2021-03-20 NOTE — PROGRESS NOTE ADULT - PROBLEM SELECTOR PLAN 1
-On admit, patient with 2+ B/L LE edema, unable to lay flat, sleeps on recliner at home  - Follows with Dr. Ji whom said patient had an echo back in 12/2020 with EF of 60% and trace MR  - Home meds include Metolazone 5 mg daily, Furosemide 80 mg daily, Toprol XL 50 mg daily, and Losartan 50 mg daily  - CXR with mild pulmonary vascular congestion   - EKG: NSR @ 83 bpm, mild ST depressions in leads V4-V6  - Troponin peaked at 0.05, no longer trending   -Repeat Echocardiogram 3/15: EF 60%, no valvulopathies  -Core measures, 1.5L fluid restriction, daily weight, strict I's and O's  -B/L LE Doppler showed no DVT  -diuresing with Lasix 40 mg IV BID, likely transition to PO diuretics on 3/21  -continue Toprol XL 25mg daily

## 2021-03-20 NOTE — PROGRESS NOTE ADULT - PROBLEM SELECTOR PLAN 5
History of CAD w/ MI, received stent to LAD in 2001 at [facility]   - continue daily Aspirin 81 mg once daily, Atorvastatin 40 mg once daily, and Toprol XL 25 mg once daily.

## 2021-03-20 NOTE — PROGRESS NOTE ADULT - PROBLEM SELECTOR PLAN 4
Hypokalemia (K 2.) on admit in setting of home metolazone 5mg and lasix 80mg   -s/p repletion and K has remained stable

## 2021-03-21 DIAGNOSIS — I48.91 UNSPECIFIED ATRIAL FIBRILLATION: ICD-10-CM

## 2021-03-21 DIAGNOSIS — B19.20 UNSPECIFIED VIRAL HEPATITIS C WITHOUT HEPATIC COMA: ICD-10-CM

## 2021-03-21 LAB
ANION GAP SERPL CALC-SCNC: 12 MMOL/L — SIGNIFICANT CHANGE UP (ref 5–17)
BUN SERPL-MCNC: 32 MG/DL — HIGH (ref 7–23)
CALCIUM SERPL-MCNC: 9.5 MG/DL — SIGNIFICANT CHANGE UP (ref 8.4–10.5)
CHLORIDE SERPL-SCNC: 94 MMOL/L — LOW (ref 96–108)
CO2 SERPL-SCNC: 31 MMOL/L — SIGNIFICANT CHANGE UP (ref 22–31)
CORTICOSTEROID BINDING GLOBULIN RESULT: 2.9 MG/DL — SIGNIFICANT CHANGE UP
CORTIS F/TOTAL MFR SERPL: 9.1 % — SIGNIFICANT CHANGE UP
CORTIS SERPL-MCNC: 18 UG/DL — SIGNIFICANT CHANGE UP
CORTISOL, FREE RESULT: 1.6 UG/DL — SIGNIFICANT CHANGE UP
CREAT SERPL-MCNC: 1.04 MG/DL — SIGNIFICANT CHANGE UP (ref 0.5–1.3)
GLUCOSE SERPL-MCNC: 110 MG/DL — HIGH (ref 70–99)
HCT VFR BLD CALC: 45 % — SIGNIFICANT CHANGE UP (ref 34.5–45)
HGB BLD-MCNC: 14.4 G/DL — SIGNIFICANT CHANGE UP (ref 11.5–15.5)
MAGNESIUM SERPL-MCNC: 2.3 MG/DL — SIGNIFICANT CHANGE UP (ref 1.6–2.6)
MCHC RBC-ENTMCNC: 27.6 PG — SIGNIFICANT CHANGE UP (ref 27–34)
MCHC RBC-ENTMCNC: 32 GM/DL — SIGNIFICANT CHANGE UP (ref 32–36)
MCV RBC AUTO: 86.2 FL — SIGNIFICANT CHANGE UP (ref 80–100)
NRBC # BLD: 0 /100 WBCS — SIGNIFICANT CHANGE UP (ref 0–0)
PLATELET # BLD AUTO: 289 K/UL — SIGNIFICANT CHANGE UP (ref 150–400)
POTASSIUM SERPL-MCNC: 3.8 MMOL/L — SIGNIFICANT CHANGE UP (ref 3.5–5.3)
POTASSIUM SERPL-SCNC: 3.8 MMOL/L — SIGNIFICANT CHANGE UP (ref 3.5–5.3)
RBC # BLD: 5.22 M/UL — HIGH (ref 3.8–5.2)
RBC # FLD: 15.9 % — HIGH (ref 10.3–14.5)
SARS-COV-2 RNA SPEC QL NAA+PROBE: SIGNIFICANT CHANGE UP
SODIUM SERPL-SCNC: 137 MMOL/L — SIGNIFICANT CHANGE UP (ref 135–145)
WBC # BLD: 8 K/UL — SIGNIFICANT CHANGE UP (ref 3.8–10.5)
WBC # FLD AUTO: 8 K/UL — SIGNIFICANT CHANGE UP (ref 3.8–10.5)

## 2021-03-21 PROCEDURE — 99232 SBSQ HOSP IP/OBS MODERATE 35: CPT

## 2021-03-21 RX ORDER — HEPARIN SODIUM 5000 [USP'U]/ML
7500 INJECTION INTRAVENOUS; SUBCUTANEOUS ONCE
Refills: 0 | Status: COMPLETED | OUTPATIENT
Start: 2021-03-21 | End: 2021-03-21

## 2021-03-21 RX ORDER — CLOPIDOGREL BISULFATE 75 MG/1
600 TABLET, FILM COATED ORAL ONCE
Refills: 0 | Status: COMPLETED | OUTPATIENT
Start: 2021-03-22 | End: 2021-03-22

## 2021-03-21 RX ORDER — HEPARIN SODIUM 5000 [USP'U]/ML
5000 INJECTION INTRAVENOUS; SUBCUTANEOUS ONCE
Refills: 0 | Status: DISCONTINUED | OUTPATIENT
Start: 2021-03-21 | End: 2021-03-21

## 2021-03-21 RX ORDER — POTASSIUM CHLORIDE 20 MEQ
40 PACKET (EA) ORAL ONCE
Refills: 0 | Status: COMPLETED | OUTPATIENT
Start: 2021-03-21 | End: 2021-03-21

## 2021-03-21 RX ADMIN — Medication 1 SPRAY(S): at 06:11

## 2021-03-21 RX ADMIN — Medication 40 MILLIGRAM(S): at 06:00

## 2021-03-21 RX ADMIN — BENZOCAINE AND MENTHOL 1 LOZENGE: 5; 1 LIQUID ORAL at 06:00

## 2021-03-21 RX ADMIN — ATORVASTATIN CALCIUM 40 MILLIGRAM(S): 80 TABLET, FILM COATED ORAL at 22:20

## 2021-03-21 RX ADMIN — Medication 5 MILLIGRAM(S): at 23:19

## 2021-03-21 RX ADMIN — LIDOCAINE 1 PATCH: 4 CREAM TOPICAL at 19:02

## 2021-03-21 RX ADMIN — LIDOCAINE 1 PATCH: 4 CREAM TOPICAL at 19:03

## 2021-03-21 RX ADMIN — SPIRONOLACTONE 25 MILLIGRAM(S): 25 TABLET, FILM COATED ORAL at 06:00

## 2021-03-21 RX ADMIN — LORATADINE 10 MILLIGRAM(S): 10 TABLET ORAL at 11:16

## 2021-03-21 RX ADMIN — Medication 25 MILLIGRAM(S): at 06:00

## 2021-03-21 RX ADMIN — LIDOCAINE 1 PATCH: 4 CREAM TOPICAL at 11:35

## 2021-03-21 RX ADMIN — BENZOCAINE AND MENTHOL 1 LOZENGE: 5; 1 LIQUID ORAL at 17:05

## 2021-03-21 RX ADMIN — BENZOCAINE AND MENTHOL 1 LOZENGE: 5; 1 LIQUID ORAL at 23:19

## 2021-03-21 RX ADMIN — Medication 975 MILLIGRAM(S): at 06:55

## 2021-03-21 RX ADMIN — LIDOCAINE 1 PATCH: 4 CREAM TOPICAL at 23:22

## 2021-03-21 RX ADMIN — Medication 1 SPRAY(S): at 15:46

## 2021-03-21 RX ADMIN — Medication 975 MILLIGRAM(S): at 12:16

## 2021-03-21 RX ADMIN — Medication 1 SPRAY(S): at 17:50

## 2021-03-21 RX ADMIN — BENZOCAINE AND MENTHOL 1 LOZENGE: 5; 1 LIQUID ORAL at 11:05

## 2021-03-21 RX ADMIN — Medication 1 SPRAY(S): at 22:22

## 2021-03-21 RX ADMIN — Medication 81 MILLIGRAM(S): at 11:16

## 2021-03-21 RX ADMIN — HEPARIN SODIUM 7500 UNIT(S): 5000 INJECTION INTRAVENOUS; SUBCUTANEOUS at 22:20

## 2021-03-21 RX ADMIN — Medication 975 MILLIGRAM(S): at 11:16

## 2021-03-21 RX ADMIN — LIDOCAINE 1 PATCH: 4 CREAM TOPICAL at 11:05

## 2021-03-21 RX ADMIN — LIDOCAINE 1 PATCH: 4 CREAM TOPICAL at 11:07

## 2021-03-21 RX ADMIN — Medication 300 MILLIGRAM(S): at 11:05

## 2021-03-21 RX ADMIN — Medication 975 MILLIGRAM(S): at 19:52

## 2021-03-21 RX ADMIN — Medication 40 MILLIEQUIVALENT(S): at 07:24

## 2021-03-21 NOTE — PROGRESS NOTE ADULT - PROBLEM SELECTOR PLAN 3
Hypokalemia (K 2.0) on admit in setting of home metolazone 5mg and lasix 80mg   -s/p repletion and K has remained stable  -started on spironolactone this admission

## 2021-03-21 NOTE — PROGRESS NOTE ADULT - PROBLEM SELECTOR PLAN 9
Pt on Effexor 300 mg daily at home  -Venlafaxine increased to 300mg daily   -continue standing tylenol and lidocaine patch    VTE PPX: Xarelto  Dispo: pending diuresis   PT eval (stairs) pending    Case d/w Dr. Ugarte Pt on Effexor 300 mg daily at home  -Venlafaxine increased to 300mg daily   -continue standing tylenol and lidocaine patch    VTE PPX: Xarelto (on hold for cath on 3/22)  Dispo: pending diuresis   PT eval (stairs) pending    Case d/w Dr. Ugarte

## 2021-03-21 NOTE — PROGRESS NOTE ADULT - PROBLEM SELECTOR PLAN 2
-Per EMS Patient found to be in A-Fib w/ RVR and rates to 150's en route to [facility], was given Cardizem 20 mg IV x 1, converted SR w/ rates in 80's  - Patient denies history of A-Fib, but does report an "arrythmia which she gave herself carotid massages for" (has not occurred for many years)  - Currently in NSR   - TSH WNL   - Started on Xarelto 20mg daily with dinner  - Continue Toprol XL 25 mg once daily (half home dose in setting of CHF exacerbation)  - EP was consulted and recommended BB and NOAC, f/u with Dr. Cortes in 6-8 weeks -Per EMS Patient found to be in A-Fib w/ RVR and rates to 150's en route to [facility], was given Cardizem 20 mg IV x 1, converted SR w/ rates in 80's  - Patient denies history of A-Fib, but does report an "arrythmia which she gave herself carotid massages for" (has not occurred for many years)  - Currently in NSR   - TSH WNL   - Started on Xarelto 20mg daily with dinner (hold on 3/21 for cath, no hep gtt per Dr. Ugarte)  - Continue Toprol XL 25 mg once daily (half home dose in setting of CHF exacerbation)  - EP was consulted and recommended BB and NOAC, f/u with Dr. Cortes in 6-8 weeks

## 2021-03-21 NOTE — PROGRESS NOTE ADULT - PROBLEM SELECTOR PLAN 8
Reactive   -LFT WNL  -f/u with outpt GI for further eval Pt on Effexor 300 mg daily at home  -Venlafaxine increased to 300mg daily   -continue standing tylenol and lidocaine patch    VTE PPX: Xarelto (on hold for cath on 3/22)  Dispo: pending cardiac cath  PT eval for stair negotiation pending    Case d/w Dr. Ugarte

## 2021-03-21 NOTE — PROGRESS NOTE ADULT - ASSESSMENT
71 y/o female, current smoker (1 PPD x >30 years), POOR HISTORIAN, w/ PMHx obesity, HTN, HLD, CAD (s/p MI w/ PCI in 2001, LAD? at [facility]), chronic diastolic CHF (EF >60% as per echo 12/2020), fibromyalgia who presented to [facility] by EMS on 3/15/2021 with c/o palpitations and weakness for 1-2 days, per EMS patient in A-FIB w/ RVR 150s and received Cardizem 20 mg IVP x1 with conversion to NSR, also found to be severely hypokalemic (K 2.0), transferred to [facility] for AF RVR and hypokalemia and found to be volume overloaded consistent with acute on chronic diastolic CHF exacerbation.         71 y/o female, current smoker (1 PPD x >30 years), POOR HISTORIAN, w/ PMHx obesity, HTN, HLD, CAD (s/p MI w/ PCI in 2001, LAD? at [facility]), chronic diastolic CHF (EF >60% as per echo 12/2020), hep C (s/p treatment), fibromyalgia who presented to [facility] by EMS on 3/15/2021 with c/o palpitations and weakness for 1-2 days, per EMS patient in A-FIB w/ RVR 150s and received Cardizem 20 mg IVP x1 with conversion to NSR, also found to be severely hypokalemic (K 2.0), transferred to [facility] for AF RVR and hypokalemia and found to be volume overloaded consistent with acute on chronic diastolic CHF exacerbation.

## 2021-03-21 NOTE — PROGRESS NOTE ADULT - SUBJECTIVE AND OBJECTIVE BOX
OVERNIGHT EVENTS:  No acute events overnight.    SUBJECTIVE / INTERVAL HPI: Patient seen and examined at bedside. C/o bilateral shoulder/neck pain (chronic). Otherwise feels well, no CP or SOB.     VITAL SIGNS:  Vital Signs Last 24 Hrs  T(C): 36.2 (21 Mar 2021 07:24), Max: 36.6 (20 Mar 2021 13:47)  T(F): 97.2 (21 Mar 2021 07:24), Max: 97.9 (20 Mar 2021 13:47)  HR: 90 (21 Mar 2021 08:35) (89 - 95)  BP: 112/81 (21 Mar 2021 08:35) (110/75 - 132/82)  RR: 18 (21 Mar 2021 08:35) (18 - 18)  SpO2: 97% (21 Mar 2021 08:35) (97% - 98%)      I&O's Summary    20 Mar 2021 07:01  -  21 Mar 2021 07:00  --------------------------------------------------------  IN: 180 mL / OUT: 1150 mL / NET: -970 mL    PHYSICAL EXAM:  General: sitting up in bed, NAD  HEENT: conjunctiva clear; MMM  Neck: supple, no JVD  Cardiovascular: RRR, no murmurs  Respiratory: CTA B/L  Gastrointestinal: soft, NT/ND, +BS  Extremities: WWP, trace LE edema  Neurological: AAOx3, no focal deficits    MEDICATIONS:  MEDICATIONS  (STANDING):  acetaminophen   Tablet .. 975 milliGRAM(s) Oral every 8 hours  aspirin enteric coated 81 milliGRAM(s) Oral daily  atorvastatin 40 milliGRAM(s) Oral at bedtime  benzocaine 15 mG/menthol 3.6 mG (Sugar-Free) Lozenge 1 Lozenge Oral every 6 hours  fluticasone propionate 50 MICROgram(s)/spray Nasal Spray 1 Spray(s) Both Nostrils every 12 hours  furosemide   Injectable 40 milliGRAM(s) IV Push two times a day  lidocaine   Patch 1 Patch Transdermal daily  lidocaine   Patch 1 Patch Transdermal daily  lidocaine   Patch 1 Patch Transdermal daily  loratadine 10 milliGRAM(s) Oral daily  metoprolol succinate ER 25 milliGRAM(s) Oral daily  rivaroxaban 20 milliGRAM(s) Oral with dinner  spironolactone 25 milliGRAM(s) Oral daily  venlafaxine XR. 300 milliGRAM(s) Oral daily    MEDICATIONS  (PRN):  ipratropium    for Nebulization 500 MICROGram(s) Nebulizer every 6 hours PRN Shortness of Breath and/or Wheezing  sodium chloride 0.65% Nasal 1 Spray(s) Both Nostrils every 4 hours PRN Nasal Congestion  zaleplon 5 milliGRAM(s) Oral at bedtime PRN Insomnia      LABS:                        14.4   8.00  )-----------( 289      ( 21 Mar 2021 06:35 )             45.0       03-21    137  |  94<L>  |  32<H>  ----------------------------<  110<H>  3.8   |  31  |  1.04    Ca    9.5      21 Mar 2021 06:35  Mg     2.3     03-21      TELEMETRY: NSR, intermittent sinus tach    RADIOLOGY & ADDITIONAL TESTS: Reviewed.

## 2021-03-21 NOTE — PROGRESS NOTE ADULT - PROBLEM SELECTOR PLAN 4
History of CAD w/ MI, received stent to LAD in 2001 at [facility]   -Patient denies anginal symptoms  -EKG sinus rhythm, non-specific ST changes, trop 0.05 to 0.03, likely due to demand ischemia  - continue daily Aspirin 81 mg once daily, Atorvastatin 40 mg once daily, and Toprol XL History of CAD w/ MI, received stent to LAD in 2001 at [facility]   -Patient denies anginal symptoms  -EKG sinus rhythm, non-specific ST changes, trop 0.05 to 0.03, likely due to demand ischemia  - continue daily Aspirin 81 mg once daily, Atorvastatin 40 mg once daily, and Toprol XL  - no recent ischemic eval. Will plan for cardiac cath on 3/22 with Dr. Black    -ASA IV, Mallampati IV  -suitable candidate for moderate sedation  -cath consent obtained    Risks & benefits of procedure and alternative therapy have been explained to the patient including but not limited to: allergic reaction, bleeding w/possible need for blood transfusion, infection, renal and vascular compromise, limb damage, arrhythmia, stroke, vessel dissection/perforation, Myocardial infarction, emergent CABG.

## 2021-03-22 ENCOUNTER — TRANSCRIPTION ENCOUNTER (OUTPATIENT)
Age: 71
End: 2021-03-22

## 2021-03-22 LAB
ANION GAP SERPL CALC-SCNC: 10 MMOL/L — SIGNIFICANT CHANGE UP (ref 5–17)
APTT BLD: 27.7 SEC — SIGNIFICANT CHANGE UP (ref 27.5–35.5)
BUN SERPL-MCNC: 33 MG/DL — HIGH (ref 7–23)
CALCIUM SERPL-MCNC: 9.4 MG/DL — SIGNIFICANT CHANGE UP (ref 8.4–10.5)
CHLORIDE SERPL-SCNC: 97 MMOL/L — SIGNIFICANT CHANGE UP (ref 96–108)
CO2 SERPL-SCNC: 30 MMOL/L — SIGNIFICANT CHANGE UP (ref 22–31)
CREAT SERPL-MCNC: 0.95 MG/DL — SIGNIFICANT CHANGE UP (ref 0.5–1.3)
GLUCOSE SERPL-MCNC: 99 MG/DL — SIGNIFICANT CHANGE UP (ref 70–99)
HCT VFR BLD CALC: 41.5 % — SIGNIFICANT CHANGE UP (ref 34.5–45)
HGB BLD-MCNC: 13.2 G/DL — SIGNIFICANT CHANGE UP (ref 11.5–15.5)
INR BLD: 1.03 — SIGNIFICANT CHANGE UP (ref 0.88–1.16)
MAGNESIUM SERPL-MCNC: 2.4 MG/DL — SIGNIFICANT CHANGE UP (ref 1.6–2.6)
MCHC RBC-ENTMCNC: 27.7 PG — SIGNIFICANT CHANGE UP (ref 27–34)
MCHC RBC-ENTMCNC: 31.8 GM/DL — LOW (ref 32–36)
MCV RBC AUTO: 87.2 FL — SIGNIFICANT CHANGE UP (ref 80–100)
NRBC # BLD: 0 /100 WBCS — SIGNIFICANT CHANGE UP (ref 0–0)
PLATELET # BLD AUTO: 284 K/UL — SIGNIFICANT CHANGE UP (ref 150–400)
POTASSIUM SERPL-MCNC: 3.9 MMOL/L — SIGNIFICANT CHANGE UP (ref 3.5–5.3)
POTASSIUM SERPL-SCNC: 3.9 MMOL/L — SIGNIFICANT CHANGE UP (ref 3.5–5.3)
PROTHROM AB SERPL-ACNC: 12.3 SEC — SIGNIFICANT CHANGE UP (ref 10.6–13.6)
RBC # BLD: 4.76 M/UL — SIGNIFICANT CHANGE UP (ref 3.8–5.2)
RBC # FLD: 15.8 % — HIGH (ref 10.3–14.5)
SODIUM SERPL-SCNC: 137 MMOL/L — SIGNIFICANT CHANGE UP (ref 135–145)
WBC # BLD: 7.81 K/UL — SIGNIFICANT CHANGE UP (ref 3.8–10.5)
WBC # FLD AUTO: 7.81 K/UL — SIGNIFICANT CHANGE UP (ref 3.8–10.5)

## 2021-03-22 PROCEDURE — 93458 L HRT ARTERY/VENTRICLE ANGIO: CPT | Mod: 26,59

## 2021-03-22 PROCEDURE — 99152 MOD SED SAME PHYS/QHP 5/>YRS: CPT

## 2021-03-22 PROCEDURE — 93010 ELECTROCARDIOGRAM REPORT: CPT

## 2021-03-22 PROCEDURE — 92933 PRQ TRLML C ATHRC ST ANGIOP1: CPT | Mod: LD

## 2021-03-22 PROCEDURE — 99233 SBSQ HOSP IP/OBS HIGH 50: CPT

## 2021-03-22 PROCEDURE — 92978 ENDOLUMINL IVUS OCT C 1ST: CPT | Mod: 26,LD

## 2021-03-22 RX ORDER — MORPHINE SULFATE 50 MG/1
2 CAPSULE, EXTENDED RELEASE ORAL ONCE
Refills: 0 | Status: DISCONTINUED | OUTPATIENT
Start: 2021-03-22 | End: 2021-03-22

## 2021-03-22 RX ORDER — BENZOCAINE AND MENTHOL 5; 1 G/100ML; G/100ML
1 LIQUID ORAL EVERY 6 HOURS
Refills: 0 | Status: DISCONTINUED | OUTPATIENT
Start: 2021-03-22 | End: 2021-03-23

## 2021-03-22 RX ORDER — POTASSIUM CHLORIDE 20 MEQ
20 PACKET (EA) ORAL ONCE
Refills: 0 | Status: COMPLETED | OUTPATIENT
Start: 2021-03-22 | End: 2021-03-22

## 2021-03-22 RX ORDER — CLOPIDOGREL BISULFATE 75 MG/1
75 TABLET, FILM COATED ORAL DAILY
Refills: 0 | Status: DISCONTINUED | OUTPATIENT
Start: 2021-03-23 | End: 2021-03-23

## 2021-03-22 RX ORDER — RIVAROXABAN 15 MG-20MG
20 KIT ORAL
Refills: 0 | Status: DISCONTINUED | OUTPATIENT
Start: 2021-03-23 | End: 2021-03-23

## 2021-03-22 RX ADMIN — Medication 20 MILLIEQUIVALENT(S): at 09:15

## 2021-03-22 RX ADMIN — Medication 1 SPRAY(S): at 18:36

## 2021-03-22 RX ADMIN — Medication 1 SPRAY(S): at 05:40

## 2021-03-22 RX ADMIN — Medication 81 MILLIGRAM(S): at 05:36

## 2021-03-22 RX ADMIN — Medication 975 MILLIGRAM(S): at 05:36

## 2021-03-22 RX ADMIN — Medication 5 MILLIGRAM(S): at 23:54

## 2021-03-22 RX ADMIN — Medication 25 MILLIGRAM(S): at 05:36

## 2021-03-22 RX ADMIN — BENZOCAINE AND MENTHOL 1 LOZENGE: 5; 1 LIQUID ORAL at 14:48

## 2021-03-22 RX ADMIN — Medication 1 SPRAY(S): at 22:13

## 2021-03-22 RX ADMIN — Medication 975 MILLIGRAM(S): at 18:36

## 2021-03-22 RX ADMIN — LIDOCAINE 1 PATCH: 4 CREAM TOPICAL at 18:36

## 2021-03-22 RX ADMIN — LIDOCAINE 1 PATCH: 4 CREAM TOPICAL at 11:32

## 2021-03-22 RX ADMIN — BENZOCAINE AND MENTHOL 1 LOZENGE: 5; 1 LIQUID ORAL at 23:54

## 2021-03-22 RX ADMIN — LIDOCAINE 1 PATCH: 4 CREAM TOPICAL at 11:36

## 2021-03-22 RX ADMIN — Medication 300 MILLIGRAM(S): at 11:35

## 2021-03-22 RX ADMIN — Medication 975 MILLIGRAM(S): at 11:32

## 2021-03-22 RX ADMIN — SPIRONOLACTONE 25 MILLIGRAM(S): 25 TABLET, FILM COATED ORAL at 05:36

## 2021-03-22 RX ADMIN — MORPHINE SULFATE 2 MILLIGRAM(S): 50 CAPSULE, EXTENDED RELEASE ORAL at 17:44

## 2021-03-22 RX ADMIN — ATORVASTATIN CALCIUM 40 MILLIGRAM(S): 80 TABLET, FILM COATED ORAL at 22:11

## 2021-03-22 RX ADMIN — LORATADINE 10 MILLIGRAM(S): 10 TABLET ORAL at 11:34

## 2021-03-22 RX ADMIN — Medication 975 MILLIGRAM(S): at 18:35

## 2021-03-22 RX ADMIN — Medication 975 MILLIGRAM(S): at 12:32

## 2021-03-22 RX ADMIN — CLOPIDOGREL BISULFATE 600 MILLIGRAM(S): 75 TABLET, FILM COATED ORAL at 09:15

## 2021-03-22 RX ADMIN — LIDOCAINE 1 PATCH: 4 CREAM TOPICAL at 11:37

## 2021-03-22 NOTE — DISCHARGE NOTE PROVIDER - CARE PROVIDER_API CALL
[facility]  Phone: (   )    -  Fax: (   )    -  Scheduled Appointment: 03/26/2021 01:30 PM   [facility]  Phone: (   )    -  Fax: (   )    -  Scheduled Appointment: 03/26/2021 01:30 PM    Carlton Dominguez)  Cardiac Electrophysiology  [address], [address] [place] [address]  Phone: [phone]  Fax: [phone]  Scheduled Appointment: 04/22/2021 11:00 AM   Carlton Dominguez)  Cardiac Electrophysiology  [address], [address] [place] [address]  Phone: [phone]  Fax: [phone]  Scheduled Appointment: 04/22/2021 11:00 AM    Pato Man)  Cardiovascular Disease  [address] [place] [address], [address] [place] [id number]  Phone: [phone]  Fax: [phone]  Scheduled Appointment: 03/24/2021 04:15 PM

## 2021-03-22 NOTE — CHART NOTE - NSCHARTNOTEFT_GEN_A_CORE
Coronary Angiography  LMCA: Normal  LAD: Prox stent with 95% ISR. Mild diffuse disease in mid and distal LAD  LCX: Prox 30% stenosis  RCA: Dominant. Mid 50-60% stenosis    Left Heart Catheterization  LVgram not performed due to reduced GFR  LVEDP 15 mmHg  No AS on pullback    Intervention  - Successful IVUS-guided PCI of prox LAD 95% ISR using 0.9 mm laser atherectomy and placement of a 3.5 x 34 mm Resolute Rod ROSA and post-dilated with a 3.5 NC balloon with 0% residual stenosis, RODRIGUEZ 3 flow and excellent angiographic result.    Radial band placed over right radial artery access site with adequate hemostasis prior to leaving the cath lab.  No procedural complications    Recommendations  - Continue DAPT with aspirin 81 mg daily indefinitely and plavix 75 mg daily for at least 1 year  - Optimize medical therapy  - Risk factor modification  - Follow up with Dr. Black after discharge - staged PCI of RCA in 4-6 weeks if clinically indicated

## 2021-03-22 NOTE — PROGRESS NOTE ADULT - PROBLEM SELECTOR PLAN 4
History of CAD w/ MI, received stent to LAD in 2001 at [facility]   -Patient denies anginal symptoms  -EKG sinus rhythm, non-specific ST changes, trop 0.05 to 0.03, likely due to demand ischemia  - continue daily Aspirin 81 mg once daily, Atorvastatin 40 mg once daily, and Toprol XL  - no recent ischemic eval. Will plan for cardiac cath on 3/22 with Dr. Black  -plavix load 600mg on 3/22    -ASA IV, Mallampati IV  -suitable candidate for moderate sedation  -cath consent obtained    Risks & benefits of procedure and alternative therapy have been explained to the patient including but not limited to: allergic reaction, bleeding w/possible need for blood transfusion, infection, renal and vascular compromise, limb damage, arrhythmia, stroke, vessel dissection/perforation, Myocardial infarction, emergent CABG.

## 2021-03-22 NOTE — DISCHARGE NOTE PROVIDER - PROVIDER TOKENS
FREE:[LAST:[Diamante],FIRST:[Kendal],PHONE:[(   )    -],FAX:[(   )    -],ADDRESS:[[facility]],SCHEDULEDAPPT:[03/26/2021],SCHEDULEDAPPTTIME:[01:30 PM]] FREE:[LAST:[Bobby],FIRST:[Kendal],PHONE:[(   )    -],FAX:[(   )    -],ADDRESS:[[facility]],SCHEDULEDAPPT:[03/26/2021],SCHEDULEDAPPTTIME:[01:30 PM]],PROVIDER:[[id number]],SCHEDULEDAPPT:[04/22/2021],SCHEDULEDAPPTTIME:[11:00 AM]] PROVIDER:[[id number]],SCHEDULEDAPPT:[04/22/2021],SCHEDULEDAPPTTIME:[11:00 AM]],PROVIDER:[[id number]],SCHEDULEDAPPT:[03/24/2021],SCHEDULEDAPPTTIME:[04:15 PM]]

## 2021-03-22 NOTE — PROGRESS NOTE ADULT - ASSESSMENT
71 y/o female, current smoker (1 PPD x >30 years), [employer] HISTORIAN, w/ PMHx obesity, HTN, HLD, CAD (s/p MI w/ PCI in 2001, LAD? at [facility]), chronic diastolic CHF (EF >60% as per echo 12/2020), hep C (s/p treatment), fibromyalgia who presented to [facility] by EMS on 3/15/2021 with c/o palpitations and weakness for 1-2 days, per EMS patient in A-FIB w/ RVR 150s and received Cardizem 20 mg IVP x1 with conversion to NSR, also found to be severely hypokalemic (K 2.0), transferred to [facility] for AF RVR and hypokalemia and found to be volume overloaded consistent with acute on chronic diastolic CHF exacerbation.

## 2021-03-22 NOTE — PROGRESS NOTE ADULT - PROBLEM SELECTOR PLAN 6
-BP well-controlled  - Home meds include Toprol XL 50 mg, Lasix 80 mg once daily, and Losartan 50 mg once daily  - c/w Toprol XL 25mg daily, Spironolactone 25 mg daily

## 2021-03-22 NOTE — PROGRESS NOTE ADULT - PROBLEM SELECTOR PROBLEM 4
Hypokalemia
CAD (coronary artery disease)
Hypokalemia
CAD (coronary artery disease)
Hypokalemia
Hypokalemia

## 2021-03-22 NOTE — PROGRESS NOTE ADULT - PROBLEM SELECTOR PLAN 2
-Per EMS Patient found to be in A-Fib w/ RVR and rates to 150's en route to [facility], was given Cardizem 20 mg IV x 1, converted SR w/ rates in 80's  - Patient denies history of A-Fib, but does report an "arrythmia which she gave herself carotid massages for" (has not occurred for many years)  - Currently in NSR   - TSH WNL   - Started on Xarelto 20mg daily with dinner (hold on 3/21 for cath, no hep gtt per Dr. Ugarte)  - Continue Toprol XL 25 mg once daily (half home dose in setting of CHF exacerbation)  - EP was consulted and recommended BB and NOAC, f/u with Dr. Cortes in 6-8 weeks

## 2021-03-22 NOTE — PROGRESS NOTE ADULT - REASON FOR ADMISSION
Afib with RVR/ Hypokalemia
Afib with RVR/Hypokalemia

## 2021-03-22 NOTE — PROGRESS NOTE ADULT - ATTENDING COMMENTS
-Pt seen and examined  -Currently in NAD, endorses subj improvement in breathing since admission; VSS  -dCHF - p/w acute on chronic dCHF, s/p IV diuresis; Currently euvolemic will d/c on Torsemide 10 PO daily and Spironolactone 25 daily; c/w Toprol 25 daily  -CAD - s/p LHC: w/ pLAD 95% s/p atherectomy  and ROSA x 1; 30% LCx; 50% mRCA; c/w ASA/Plavix; Triple therapy x 2 weeks then d/c ASA; c/w Statin  -AF - p/w AF w/ RVR; s/p self conversion likely in the setting of improved volume status w/ IV diuresis; c/w Toprol 25 daily and Xarelto 25 daily  -DASH diet; Pt. counseled on low salt diet, wt. loss and exercise  -Consider outpatient sleep study to r/o NAHUM  -DVT PPx  -OOB to chair  -Dispo: Cardiac Tele; D/C home tmrw    Ling Lieberman MD  Cardiology Attending

## 2021-03-22 NOTE — PROGRESS NOTE ADULT - PROBLEM SELECTOR PROBLEM 1
Acute on chronic diastolic HF (heart failure)

## 2021-03-22 NOTE — PROGRESS NOTE ADULT - SUBJECTIVE AND OBJECTIVE BOX
OVERNIGHT EVENTS:  No acute events overnight.    SUBJECTIVE / INTERVAL HPI: Patient seen and examined at bedside. Comfortable, denies CP, SOB, dizziness, palpitations. C/o chronic bilateral shoulder pain.     VITAL SIGNS:  Vital Signs Last 24 Hrs  T(C): 36.1 (22 Mar 2021 09:22), Max: 36.7 (21 Mar 2021 20:43)  T(F): 97 (22 Mar 2021 09:22), Max: 98.1 (21 Mar 2021 20:43)  HR: 79 (22 Mar 2021 09:18) (79 - 92)  BP: 117/85 (22 Mar 2021 09:18) (107/64 - 144/81)  RR: 18 (22 Mar 2021 09:18) (18 - 18)  SpO2: 97% (22 Mar 2021 09:18) (95% - 97%)    I&O's Summary    21 Mar 2021 07:01  -  22 Mar 2021 07:00  --------------------------------------------------------  IN: 780 mL / OUT: 1000 mL / NET: -220 mL    PHYSICAL EXAM:  General: sitting up in bed, NAD  HEENT: conjunctiva clear; MMM  Neck: supple, no JVD  Cardiovascular: RRR, no murmurs  Respiratory: CTA B/L  Gastrointestinal: soft, NT/ND, +BS  Extremities: WWP, trace edema  Vascular: carotid 2+ b/l, no bruits; radial 2+ b/l; femoral 2+ b/l no bruits; DP 2+ b/l, PT 1+ b/l  Neurological: AAOx3, no focal deficits    MEDICATIONS:  MEDICATIONS  (STANDING):  acetaminophen   Tablet .. 975 milliGRAM(s) Oral every 8 hours  aspirin enteric coated 81 milliGRAM(s) Oral daily  atorvastatin 40 milliGRAM(s) Oral at bedtime  benzocaine 15 mG/menthol 3.6 mG Lozenge 1 Lozenge Oral every 6 hours  fluticasone propionate 50 MICROgram(s)/spray Nasal Spray 1 Spray(s) Both Nostrils every 12 hours  lidocaine   Patch 1 Patch Transdermal daily  lidocaine   Patch 1 Patch Transdermal daily  lidocaine   Patch 1 Patch Transdermal daily  loratadine 10 milliGRAM(s) Oral daily  metoprolol succinate ER 25 milliGRAM(s) Oral daily  spironolactone 25 milliGRAM(s) Oral daily  venlafaxine XR. 300 milliGRAM(s) Oral daily    MEDICATIONS  (PRN):  ipratropium    for Nebulization 500 MICROGram(s) Nebulizer every 6 hours PRN Shortness of Breath and/or Wheezing  sodium chloride 0.65% Nasal 1 Spray(s) Both Nostrils every 4 hours PRN Nasal Congestion  zaleplon 5 milliGRAM(s) Oral at bedtime PRN Insomnia      LABS:                        13.2   7.81  )-----------( 284      ( 22 Mar 2021 06:46 )             41.5       03-22    137  |  97  |  33<H>  ----------------------------<  99  3.9   |  30  |  0.95    Ca    9.4      22 Mar 2021 06:46  Mg     2.4     03-22        PT/INR - ( 22 Mar 2021 06:46 )   PT: 12.3 sec;   INR: 1.03          PTT - ( 22 Mar 2021 06:46 )  PTT:27.7 sec          TELEMETRY:    RADIOLOGY & ADDITIONAL TESTS: Reviewed.

## 2021-03-22 NOTE — PROGRESS NOTE ADULT - PROBLEM SELECTOR PLAN 1
-Approaching euvolemia   - Follows with Dr. Ireland who stated patient had an echo back in 12/2020 with EF of 60% and trace MR, was started on Lasix at that time. Current home meds include Metolazone 5 mg daily, Furosemide 80 mg daily, Toprol XL 50 mg daily, and Losartan 50 mg daily  - Presented with b/l LE edema, hypoxia requiring 1L NC. CXR with mild pulmonary vascular congestion,   - EKG: NSR @ 83 bpm, non-specific ST changes, troponin peaked at 0.05, no longer trending   -Repeat Echocardiogram 3/15: normal BiV size/function, no sig valvular disease, no pHTN  -B/L LE Doppler showed no DVT  -Core measures, 1.5L fluid restriction, daily weight, strict I's and O's  -Net negative approx. 5-6L this admission  -diuresed with Lasix 40 mg IV BID, transition to PO  -continue Toprol XL 25mg daily (half home dose), spironolactone 25mg daily  -plan for ischemic eval via cardiac cath today with Dr. Black

## 2021-03-22 NOTE — PROGRESS NOTE ADULT - PROBLEM SELECTOR PROBLEM 2
Atrial fibrillation with RVR
R/O Atrial fibrillation with RVR
R/O Atrial fibrillation with RVR
Atrial fibrillation with RVR
R/O Atrial fibrillation with RVR
R/O Atrial fibrillation with RVR

## 2021-03-22 NOTE — DISCHARGE NOTE PROVIDER - HOSPITAL COURSE
71 y/o female, current smoker (1 PPD x >30 years), POOR HISTORIAN, w/ PMHx obesity, HTN, HLD, CAD (s/p MI w/ PCI in 2001, LAD? at [facility]), chronic diastolic CHF (EF >60% as per echo 12/2020), hep C (s/p treatment), fibromyalgia who presented to [facility] by EMS on 3/15/2021 with c/o palpitations and weakness for 1-2 days, per EMS patient in A-FIB w/ RVR 150s and received Cardizem 20 mg IVP x1 with conversion to NSR, also found to be severely hypokalemic (K 2.0), transferred to [facility] for AF RVR and hypokalemia and found to be volume overloaded consistent with acute on chronic diastolic CHF exacerbation.       Problem/Plan - 1:  ·  Problem: Acute on chronic diastolic HF (heart failure).  Plan: -Approaching euvolemia   - Follows with Dr. Ireland who stated patient had an echo back in 12/2020 with EF of 60% and trace MR, was started on Lasix at that time. Current home meds include Metolazone 5 mg daily, Furosemide 80 mg daily, Toprol XL 50 mg daily, and Losartan 50 mg daily  - Presented with b/l LE edema, hypoxia requiring 1L NC. CXR with mild pulmonary vascular congestion,   - EKG: NSR @ 83 bpm, non-specific ST changes, troponin peaked at 0.05, no longer trending   -Repeat Echocardiogram 3/15: normal BiV size/function, no sig valvular disease, no pHTN  -B/L LE Doppler showed no DVT  -Core measures, 1.5L fluid restriction, daily weight, strict I's and O's  -Net negative approx. 5-6L this admission  -diuresed with Lasix 40 mg IV BID, transition to PO  -continue Toprol XL 25mg daily (half home dose), spironolactone 25mg daily  -plan for ischemic eval via cardiac cath today with Dr. Black.      Problem/Plan - 2:  ·  Problem: Atrial fibrillation with RVR.  Plan: -Per EMS Patient found to be in A-Fib w/ RVR and rates to 150's en route to [facility], was given Cardizem 20 mg IV x 1, converted SR w/ rates in 80's  - Patient denies history of A-Fib, but does report an "arrythmia which she gave herself carotid massages for" (has not occurred for many years)  - Currently in NSR   - TSH WNL   - Started on Xarelto 20mg daily with dinner (hold on 3/21 for cath, no hep gtt per Dr. Ugarte)  - Continue Toprol XL 25 mg once daily (half home dose in setting of CHF exacerbation)  - EP was consulted and recommended BB and NOAC, f/u with Dr. Cortes in 6-8 weeks.      Problem/Plan - 3:  ·  Problem: Hypokalemia.  Plan: Hypokalemia (K 2.0) on admit in setting of home metolazone 5mg and lasix 80mg   -s/p repletion and K has remained stable  -started on spironolactone this admission.      Problem/Plan - 4:  ·  Problem: CAD (coronary artery disease).  Plan: History of CAD w/ MI, received stent to LAD in 2001 at [facility]   -Patient denies anginal symptoms  -EKG sinus rhythm, non-specific ST changes, trop 0.05 to 0.03, likely due to demand ischemia  - continue daily Aspirin 81 mg once daily, Atorvastatin 40 mg once daily, and Toprol XL  - no recent ischemic eval. Will plan for cardiac cath on 3/22 with Dr. Black  -plavix load 600mg on 3/22     Problem/Plan - 5:  ·  Problem: Acute kidney injury.  Plan: -Cr 1.51 on admission, improved to 0.8-1.0.      Problem/Plan - 6:  Problem: Hypertension. Plan: -BP well-controlled  - Home meds include Toprol XL 50 mg, Lasix 80 mg once daily, and Losartan 50 mg once daily  - c/w Toprol XL 25mg daily, Spironolactone 25 mg daily.     Problem/Plan - 7:  ·  Problem: Hyperlipidemia.  Plan: Cholesterol 167, Triglycerides 123, HDL 84, LDL 58  - Home meds: Atorvastatin 40 mg once daily.      Problem/Plan - 8:  ·  Problem: Fibromyalgia.  Plan: Pt on Effexor 300 mg daily at home  -Venlafaxine increased to 300mg daily   -continue standing tylenol and lidocaine patch    VTE PPX: Xarelto (on hold for cath)  Dispo: pending cardiac cath, likely d/c on 3/23    Case d/w Dr. Lieberman. Pt is a 71 y/o F, current smoker (1 ppd x >30 years), POOR HISTORIAN, with PMHx of CAD (s/p MI with PCI in  - LAD? at [facility]), chronic diastolic CHF (EF > 60% as per echo 2020), HTN, HLD, fibromyalgia, presented to [facility] by EMS on 3/15/2021 endorsing feeling like her "heart was racing" and weakness for the past 18 hours. Patient called 911 earlier this morning, due to chest pain, anxiety, and shortness of breath. Per EMS, patient in AFIB with RVR (rates to:150s - however strip not present on arrival to [facility]). Patient received Cardizem 20 mg IVP x 1 in route to ED, HR on arrival to [facility], patient in NSR with HR to 80s. Patient denies hx of afib in the past, but does state she was told she had had an arrythmia in which she was told to massage her carotid for - in which she was, but continued to have palpitations this time. Patient reports intermittent mild 6/10 chest pressure. Patient also states that she has been having a hard time throughout the pandemic, reports to >100 lb weight gain over the past year and has been inactive due to hip replacement 3 years ago. Patient reports feeling depressed and has been binge eating and drinking more alcohol than usual. Patient also stating that she has been prescribed a diuretic from cardiologist, Dr. Ireland, but has not been using them as she does not "want to be dependent." Patient denies fever, chills, diaphoresis, MORRIS, orthopnea, cough, hemoptysis, peripheral edema, focal weakness, numbness, tingling, paresthesia, HA, dizziness, neck pain, N/V/D/C, recent fall, recent travel, or sick contacts. [facility]: T: 98.2   BP:124/69 mmHg   HR:88  spO2: 98% on RA   RR: 16  Labs significant for: Sodium: 131, Potassium: 2.0, Ma.8, BUN/Cr: 71/1.51, Troponin I: 0.161, Creatinine Kinase: 270, CFR: 35. EKG: NSR @ 82 bpm. CXR: Cardiomegaly, no acute opacity.   COVID-PCR: negative. At [facility], patient received KCL 10 meq IV x 1, KCL 40 mEq PO x 1, Magnesium 2 mg IVP x 1, and Ondansterone 4 mg IVP x 1.  However, patient only tolerated 1/2 IV potassium due to burning at site. On arrival to [facility], patient CP free. VSS. Pt found to be overloaded with 2+ pitting LE edema. Patient's EKG with NSR @ 83 bpm, mild ST depressions in V4-V6. Labs significant for: K: 2.4, BUN: 60, Troponin: 0.05. Patient received KCL 40 mEq.  Patient to be further management for hypokalemia, acute on chronic diastolic heart failure. Patient s/p diuresis with 5.8L output on day of discharge, satting well on RA, and transitioned to PO torsemide 10mg QD on discharge. During admission patient      Pt is a 69 y/o F, current smoker (1 ppd x >30 years), POOR HISTORIAN, with PMHx of CAD (s/p MI with PCI in  - LAD? at [facility]), chronic diastolic CHF (EF > 60% as per echo 2020), HTN, HLD, fibromyalgia, presented to [facility] by EMS on 3/15/2021 endorsing feeling like her "heart was racing" and weakness for the past 18 hours. Patient called 911 earlier this morning, due to chest pain, anxiety, and shortness of breath. Per EMS, patient in AFIB with RVR (rates to:150s - however strip not present on arrival to [facility]). Patient received Cardizem 20 mg IVP x 1 in route to ED, HR on arrival to [facility], patient in NSR with HR to 80s. Patient denies hx of afib in the past, but does state she was told she had had an arrythmia in which she was told to massage her carotid for - in which she was, but continued to have palpitations this time. Patient reports intermittent mild 6/10 chest pressure. Patient also states that she has been having a hard time throughout the pandemic, reports to >100 lb weight gain over the past year and has been inactive due to hip replacement 3 years ago. Patient reports feeling depressed and has been binge eating and drinking more alcohol than usual. Patient also stating that she has been prescribed a diuretic from cardiologist, Dr. Ireland, but has not been using them as she does not "want to be dependent." Patient denies fever, chills, diaphoresis, MORRIS, orthopnea, cough, hemoptysis, peripheral edema, focal weakness, numbness, tingling, paresthesia, HA, dizziness, neck pain, N/V/D/C, recent fall, recent travel, or sick contacts. [facility]: T: 98.2   BP:124/69 mmHg   HR:88  spO2: 98% on RA   RR: 16  Labs significant for: Sodium: 131, Potassium: 2.0, Ma.8, BUN/Cr: 71/1.51, Troponin I: 0.161, Creatinine Kinase: 270, CFR: 35. EKG: NSR @ 82 bpm. CXR: Cardiomegaly, no acute opacity.   COVID-PCR: negative. At [facility], patient received KCL 10 meq IV x 1, KCL 40 mEq PO x 1, Magnesium 2 mg IVP x 1, and Ondansterone 4 mg IVP x 1.  However, patient only tolerated 1/2 IV potassium due to burning at site. On arrival to [facility], patient CP free. VSS. Pt found to be overloaded with 2+ pitting LE edema. Patient's EKG with NSR @ 83 bpm, mild ST depressions in V4-V6. Labs significant for: K: 2.4, BUN: 60, Troponin: 0.05. Patient received KCL 40 mEq.  Patient to be further management for hypokalemia, acute on chronic diastolic heart failure. Patient s/p diuresis with 5.8L output on day of discharge, satting well on RA, and transitioned to PO torsemide 10mg QD on discharge. During admission patient with episode of afib with RVR, was given IV cardizem and started on Xarelto. EP consulted and recommended NOAC and BB with outpatient follow up in 6-8 weeks. She subsequently self-converted with no further episodes of afib. Patient with hpokalemia secondary to home metolazone and Lasix, she was started on spironolactone with improvement of potassium, and will be discharge on torsemide 10mg QD per Dr. Lieberman. She underwent a cardiac catheterization  received a laser atherectomy/ROSA pLAD with residual mRCA 50-60% with possible plan for staged PCI with Dr. Black. MARSHA on admission Cr 1.51, which has no resolved 0.98 on discharge.      Pt is a 71 y/o F, current smoker (1 ppd x >30 years), POOR HISTORIAN, with PMHx of CAD (s/p MI with PCI in  - LAD? at [facility]), chronic diastolic CHF (EF > 60% as per echo 2020), HTN, HLD, fibromyalgia, presented to [facility] by EMS on 3/15/2021 endorsing feeling like her "heart was racing" and weakness for the past 18 hours. Patient called 911 earlier this morning, due to chest pain, anxiety, and shortness of breath. Per EMS, patient in AFIB with RVR (rates to:150s - however strip not present on arrival to [facility]). Patient received Cardizem 20 mg IVP x 1 in route to ED, HR on arrival to [facility], patient in NSR with HR to 80s. Patient denies hx of afib in the past, but does state she was told she had had an arrythmia in which she was told to massage her carotid for - in which she was, but continued to have palpitations this time. Patient reports intermittent mild 6/10 chest pressure. Patient also states that she has been having a hard time throughout the pandemic, reports to >100 lb weight gain over the past year and has been inactive due to hip replacement 3 years ago. Patient reports feeling depressed and has been binge eating and drinking more alcohol than usual. Patient also stating that she has been prescribed a diuretic from cardiologist, Dr. Ireland, but has not been using them as she does not "want to be dependent." Patient denies fever, chills, diaphoresis, MORRIS, orthopnea, cough, hemoptysis, peripheral edema, focal weakness, numbness, tingling, paresthesia, HA, dizziness, neck pain, N/V/D/C, recent fall, recent travel, or sick contacts. [facility] T: 98.2   BP:124/69 mmHg   HR:88  spO2: 98% on RA   RR: 16  Labs significant for: Sodium: 131, Potassium: 2.0, Ma.8, BUN/Cr: 71/1.51, Troponin I: 0.161, Creatinine Kinase: 270, CFR: 35. EKG: NSR @ 82 bpm. CXR: Cardiomegaly, no acute opacity.   COVID-PCR: negative. At [facility], patient received KCL 10 meq IV x 1, KCL 40 mEq PO x 1, Magnesium 2 mg IVP x 1, and Ondansterone 4 mg IVP x 1.  However, patient only tolerated 1/2 IV potassium due to burning at site. On arrival to [facility], patient CP free. VSS. Pt found to be overloaded with 2+ pitting LE edema. Patient's EKG with NSR @ 83 bpm, mild ST depressions in V4-V6. Labs significant for: K: 2.4, BUN: 60, Troponin: 0.05. Patient received KCL 40 mEq.  Patient to be further management for hypokalemia, acute on chronic diastolic heart failure. Patient s/p diuresis with 5.8L output on day of discharge, satting well on RA, and transitioned to PO torsemide 10mg QD on discharge. During admission patient with episode of afib with RVR, was given IV cardizem and started on Xarelto. EP consulted and recommended NOAC and BB with outpatient follow up in 6-8 weeks. She subsequently self-converted with no further episodes of afib. Patient with hpokalemia secondary to home metolazone and Lasix, she was started on spironolactone with improvement of potassium, and will be discharge on torsemide 10mg QD per Dr. Lieberman. She underwent a cardiac catheterization  received a laser atherectomy/ROSA pLAD with residual mRCA 50-60% with possible plan for staged PCI with Dr. Black. MARSHA on admission Cr 1.51, which has no resolved 0.98 on discharge. Overnight, no events. Radial band removed without complications. This AM, feels well, denies CP, SOB, n/v/d, LE edema. VSS, no events on tele, labs K 3.8 repleted w/ KCL 20meq. PE sig for R robert site stable c/d/i, slight puffiness but no bleeding, pain or hematoma. Patient was seen and examined by attending who agrees with above d/c plan. All meds rx to pharmacy and explained to patient. Patient instructed to return if sx worsen including not limited to chest pain, shortness of breath.      Pt is a 69 y/o F, current smoker (1 ppd x >30 years), POOR HISTORIAN, with PMHx of CAD (s/p MI with PCI in  - LAD? at [facility]), chronic diastolic CHF (EF > 60% as per echo 2020), HTN, HLD, fibromyalgia, presented to [facility] by EMS on 3/15/2021 endorsing feeling like her "heart was racing" and weakness for the past 18 hours. Patient called 911 earlier this morning, due to chest pain, anxiety, and shortness of breath. Per EMS, patient in AFIB with RVR (rates to:150s - however strip not present on arrival to [facility]). Patient received Cardizem 20 mg IVP x 1 in route to ED, HR on arrival to [facility], patient in NSR with HR to 80s. Patient denies hx of afib in the past, but does state she was told she had had an arrythmia in which she was told to massage her carotid for - in which she was, but continued to have palpitations this time. Patient reports intermittent mild 6/10 chest pressure. Patient also states that she has been having a hard time throughout the pandemic, reports to >100 lb weight gain over the past year and has been inactive due to hip replacement 3 years ago. Patient reports feeling depressed and has been binge eating and drinking more alcohol than usual. Patient also stating that she has been prescribed a diuretic from cardiologist, Dr. Ireland, but has not been using them as she does not "want to be dependent." Patient denies fever, chills, diaphoresis, MORRIS, orthopnea, cough, hemoptysis, peripheral edema, focal weakness, numbness, tingling, paresthesia, HA, dizziness, neck pain, N/V/D/C, recent fall, recent travel, or sick contacts. [facility]: 98.2   BP:124/69 mmHg   HR:88  spO2: 98% on RA   RR: 16  Labs significant for: Sodium: 131, Potassium: 2.0, Ma.8, BUN/Cr: 71/1.51, Troponin I: 0.161, Creatinine Kinase: 270, CFR: 35. EKG: NSR @ 82 bpm. CXR: Cardiomegaly, no acute opacity.   COVID-PCR: negative. At [facility], patient received KCL 10 meq IV x 1, KCL 40 mEq PO x 1, Magnesium 2 mg IVP x 1, and Ondansterone 4 mg IVP x 1.  However, patient only tolerated 1/2 IV potassium due to burning at site. On arrival to [facility], patient CP free. VSS. Pt found to be overloaded with 2+ pitting LE edema. Patient's EKG with NSR @ 83 bpm, mild ST depressions in V4-V6. Labs significant for: K: 2.4, BUN: 60, Troponin: 0.05. Patient received KCL 40 mEq.  Patient to be further management for hypokalemia, acute on chronic diastolic heart failure. Patient s/p diuresis with 5.8L output on day of discharge, satting well on RA, and transitioned to PO torsemide 10mg QD on discharge. During admission patient with episode of afib with RVR, was given IV cardizem and started on Xarelto. EP consulted and recommended NOAC and BB with outpatient follow up in 6-8 weeks. She subsequently self-converted with no further episodes of afib. Patient with hpokalemia secondary to home metolazone and Lasix, she was started on spironolactone with improvement of potassium, and will be discharge on torsemide 10mg QD per Dr. Lieberman. She underwent a cardiac catheterization  received a laser atherectomy/ROSA pLAD with residual mRCA 50-60% with possible plan for staged PCI with Dr. Black. MARSHA on admission Cr 1.51, which has no resolved 0.98 on discharge. Overnight, no events. Radial band removed without complications. This AM, feels well, denies CP, SOB, n/v/d, LE edema. VSS, no events on tele, labs unremarkable, PE sig for R radial site stable c/d/i, no bleeding, pain or hematoma. Patient was seen and examined by attending who agrees with above d/c plan. All meds rx to pharmacy and explained to patient. Patient instructed to return if sx worsen including not limited to chest pain, shortness of breath.

## 2021-03-22 NOTE — DISCHARGE NOTE PROVIDER - NSDCMRMEDTOKEN_GEN_ALL_CORE_FT
Aspirin Enteric Coated 81 mg oral delayed release tablet: 1 tab(s) orally once a day  atorvastatin 40 mg oral tablet: 1 tab(s) orally once a day  Lasix 80 mg oral tablet: 1 tab(s) orally once a day  losartan 50 mg oral tablet: 1 tab(s) orally once a day  metOLazone 5 mg oral tablet: 1 tab(s) orally once a day  Metoprolol Succinate ER 50 mg oral tablet, extended release: 1 tab(s) orally once a day  venlafaxine 150 mg oral tablet, extended release: 2 tab(s) orally once a day   Aspirin Enteric Coated 81 mg oral delayed release tablet: 1 tab(s) orally once a day  atorvastatin 40 mg oral tablet: 1 tab(s) orally once a day  losartan 50 mg oral tablet: 1 tab(s) orally once a day  Metoprolol Succinate ER 50 mg oral tablet, extended release: 1 tab(s) orally once a day  Plavix 75 mg oral tablet: 1 tab(s) orally once a day  rivaroxaban 20 mg oral tablet: 1 tab(s) orally once a day (before a meal)  spironolactone 25 mg oral tablet: 1 tab(s) orally once a day  torsemide 10 mg oral tablet: 1 tab(s) orally once a day  venlafaxine 150 mg oral tablet, extended release: 2 tab(s) orally once a day

## 2021-03-22 NOTE — DISCHARGE NOTE PROVIDER - NSDCCPCAREPLAN_GEN_ALL_CORE_FT
PRINCIPAL DISCHARGE DIAGNOSIS  Diagnosis: Acute exacerbation of congestive heart failure  Assessment and Plan of Treatment:       SECONDARY DISCHARGE DIAGNOSES  Diagnosis: Atrial fibrillation  Assessment and Plan of Treatment:      PRINCIPAL DISCHARGE DIAGNOSIS  Diagnosis: Acute on chronic diastolic heart failure  Assessment and Plan of Treatment: You have a history of heart failure and should continue your daily torsemide and spironolactone. You should weigh yourself daily and if you notice a weight gain of more than 2-3 pounds in 2 days, shortness of breath, or lower extremity swelling you should contact your doctor immediately. Please restrict your fluid intake to 1-2L daily.   -Please follow up with Dr. Mendoza on Friday 03/26 at 1:30pm        SECONDARY DISCHARGE DIAGNOSES  Diagnosis: Atrial fibrillation  Assessment and Plan of Treatment:      PRINCIPAL DISCHARGE DIAGNOSIS  Diagnosis: Acute on chronic diastolic heart failure  Assessment and Plan of Treatment: You have a history of heart failure and should continue your daily torsemide 10mg and spironolactone 25mg. You should weigh yourself daily and if you notice a weight gain of more than 2-3 pounds in 2 days, shortness of breath, or lower extremity swelling you should contact your doctor immediately. Please restrict your fluid intake to 1-2L daily.   -Please follow up with Dr. Mendoza on Friday 03/26 at 1:30pm  -You NO LONGER should take the metolazone and Lasix.      SECONDARY DISCHARGE DIAGNOSES  Diagnosis: Coronary artery disease  Assessment and Plan of Treatment: You have a diagnosis of coronary artery disease and have been started on daily aspirin and plavix. You have a diagnosis of coronary artery disease and received a stent to your coronary artery. You should continue daily aspirin and Plavix to ensure your stent does not close. DO NOT STOP THESE MEDICATIONS FOR ANY REASON UNLESS OTHERWISE INDICATED BY YOUR CARDIOLOGIST BECAUSE THIS WILL PUT YOU AT RISK FOR A HEART ATTACK. You should refrain from strenuous activity and heavy lifting for 1 week. Please make a follow up appointment with your cardiologist within 1-2 weeks of your discharge. All of your prescriptions have been sent electronically to your pharmacy.   -continue aspirin 81mg orally daily, Plavix 75mg orally daily, and Xarelto 20mg orally daily. AFTER TWO WEEKS you are to STOP the aspirin and ONLY continue the Plavix and Xarelto daily.   -Please follow up with your cardiologist on 03/26 at 1:30pm, as your cardiologist Dr. Ireland is on vacation during that time.    Diagnosis: Atrial fibrillation  Assessment and Plan of Treatment: When you presented, your heart was in an irregular rhythm called atrial fibrillation. You were given medication in the emergency department, and your heart returned to normal rhythm. During episode it means your atrium do not contract properly and blood does not efficiently get pumped into the ventricles. This may lead to blood abnormally pooling in the ventricles and causing it to clot. This puts you at an increased risk for a stroke. Therefore, we started you on a heart rate controlling medication called metoprolol succinate 25mg orally daily and a blood-thinning medication called Xarelto 20mg orally daily.  -You have an appointment with electrophysiologist Dr. Deleon on 04/22/21 at 11AM    Diagnosis: HTN (hypertension)  Assessment and Plan of Treatment: You have a history of elevated blood pressure and you should continue your blood pressure medications as prescribed.   -monitor blood pressure daily with goal <140/90    Diagnosis: Hyperlipidemia  Assessment and Plan of Treatment: Please continue your daily statin to ensure your cardiac stent remains open.     PRINCIPAL DISCHARGE DIAGNOSIS  Diagnosis: Acute on chronic diastolic heart failure  Assessment and Plan of Treatment: You have a history of heart failure and should continue your daily torsemide 10mg and spironolactone 25mg. You should weigh yourself daily and if you notice a weight gain of more than 2-3 pounds in 2 days, shortness of breath, or lower extremity swelling you should contact your doctor immediately. Please restrict your fluid intake to 1-2L daily.   -Please follow up with Dr. Man on Wednesday 03/24 at 4:15pm  -You NO LONGER should take the metolazone and Lasix.      SECONDARY DISCHARGE DIAGNOSES  Diagnosis: Coronary artery disease  Assessment and Plan of Treatment: You have a diagnosis of coronary artery disease and have been started on daily aspirin and plavix. You have a diagnosis of coronary artery disease and received a stent to your coronary artery. You should continue daily aspirin and Plavix to ensure your stent does not close. DO NOT STOP THESE MEDICATIONS FOR ANY REASON UNLESS OTHERWISE INDICATED BY YOUR CARDIOLOGIST BECAUSE THIS WILL PUT YOU AT RISK FOR A HEART ATTACK. You should refrain from strenuous activity and heavy lifting for 1 week. Please make a follow up appointment with your cardiologist within 1-2 weeks of your discharge. All of your prescriptions have been sent electronically to your pharmacy.   -continue aspirin 81mg orally daily, Plavix 75mg orally daily, and Xarelto 20mg orally daily. AFTER TWO WEEKS you are to STOP the aspirin and ONLY continue the Plavix and Xarelto daily.   -Please follow up with your cardiologist on 03/24 at 4:15pm    Diagnosis: Atrial fibrillation  Assessment and Plan of Treatment: When you presented, your heart was in an irregular rhythm called atrial fibrillation. You were given medication in the emergency department, and your heart returned to normal rhythm. During episode it means your atrium do not contract properly and blood does not efficiently get pumped into the ventricles. This may lead to blood abnormally pooling in the ventricles and causing it to clot. This puts you at an increased risk for a stroke. Therefore, we started you on a heart rate controlling medication called metoprolol succinate 25mg orally daily and a blood-thinning medication called Xarelto 20mg orally daily.  -You have an appointment with electrophysiologist Dr. Deleon on 04/22/21 at 11AM    Diagnosis: HTN (hypertension)  Assessment and Plan of Treatment: You have a history of elevated blood pressure and you should continue your blood pressure medications as prescribed.   -monitor blood pressure daily with goal <140/90    Diagnosis: Hyperlipidemia  Assessment and Plan of Treatment: Please continue your daily statin to ensure your cardiac stent remains open.

## 2021-03-22 NOTE — PROGRESS NOTE ADULT - PROBLEM SELECTOR PLAN 8
Pt on Effexor 300 mg daily at home  -Venlafaxine increased to 300mg daily   -continue standing tylenol and lidocaine patch    VTE PPX: Xarelto (on hold for cath)  Dispo: pending cardiac cath, likely d/c on 3/23    Case d/w Dr. Lieberman

## 2021-03-22 NOTE — PROGRESS NOTE ADULT - PROBLEM SELECTOR PROBLEM 5
Acute kidney injury
CAD (coronary artery disease)
CAD (coronary artery disease)
Acute kidney injury
CAD (coronary artery disease)
CAD (coronary artery disease)

## 2021-03-22 NOTE — PROGRESS NOTE ADULT - PROVIDER SPECIALTY LIST ADULT
Cardiology
Nephrology
Intervent Cardiology
Cardiology
Cardiology
Intervent Cardiology
Intervent Cardiology

## 2021-03-22 NOTE — DISCHARGE NOTE PROVIDER - NSDCFUADDAPPT_GEN_ALL_CORE_FT
Please bring your Insurance card, Photo ID and Discharge paperwork to the following appointment:    Please follow up with your Cardiac Electrophysiology Provider, Dr. Carlton Dominguez at [address] [place] [address], [address], [place] [address] on 04/22/2021 at 11:00am.    Appointment was scheduled by Ms. DEMIAN Minor, Referral Coordinator.

## 2021-03-22 NOTE — PROGRESS NOTE ADULT - PROBLEM SELECTOR PROBLEM 6
Hypertension
Acute kidney injury
Hypertension
Acute kidney injury

## 2021-03-22 NOTE — DISCHARGE NOTE PROVIDER - CARE PROVIDERS DIRECT ADDRESSES
,DirectAddress_Unknown ,DirectAddress_Unknown,[email or web address]

## 2021-03-23 ENCOUNTER — TRANSCRIPTION ENCOUNTER (OUTPATIENT)
Age: 71
End: 2021-03-23

## 2021-03-23 VITALS — TEMPERATURE: 97 F

## 2021-03-23 DIAGNOSIS — E78.5 HYPERLIPIDEMIA, UNSPECIFIED: ICD-10-CM

## 2021-03-23 DIAGNOSIS — I10 ESSENTIAL (PRIMARY) HYPERTENSION: ICD-10-CM

## 2021-03-23 DIAGNOSIS — I48.91 UNSPECIFIED ATRIAL FIBRILLATION: ICD-10-CM

## 2021-03-23 LAB
ANION GAP SERPL CALC-SCNC: 11 MMOL/L — SIGNIFICANT CHANGE UP (ref 5–17)
BUN SERPL-MCNC: 28 MG/DL — HIGH (ref 7–23)
CALCIUM SERPL-MCNC: 9.4 MG/DL — SIGNIFICANT CHANGE UP (ref 8.4–10.5)
CHLORIDE SERPL-SCNC: 97 MMOL/L — SIGNIFICANT CHANGE UP (ref 96–108)
CO2 SERPL-SCNC: 27 MMOL/L — SIGNIFICANT CHANGE UP (ref 22–31)
CREAT SERPL-MCNC: 0.98 MG/DL — SIGNIFICANT CHANGE UP (ref 0.5–1.3)
GLUCOSE SERPL-MCNC: 101 MG/DL — HIGH (ref 70–99)
HCT VFR BLD CALC: 43.9 % — SIGNIFICANT CHANGE UP (ref 34.5–45)
HGB BLD-MCNC: 13.7 G/DL — SIGNIFICANT CHANGE UP (ref 11.5–15.5)
MAGNESIUM SERPL-MCNC: 2.4 MG/DL — SIGNIFICANT CHANGE UP (ref 1.6–2.6)
MCHC RBC-ENTMCNC: 27.5 PG — SIGNIFICANT CHANGE UP (ref 27–34)
MCHC RBC-ENTMCNC: 31.2 GM/DL — LOW (ref 32–36)
MCV RBC AUTO: 88.2 FL — SIGNIFICANT CHANGE UP (ref 80–100)
NRBC # BLD: 0 /100 WBCS — SIGNIFICANT CHANGE UP (ref 0–0)
PLATELET # BLD AUTO: 297 K/UL — SIGNIFICANT CHANGE UP (ref 150–400)
POTASSIUM SERPL-MCNC: 4.2 MMOL/L — SIGNIFICANT CHANGE UP (ref 3.5–5.3)
POTASSIUM SERPL-SCNC: 4.2 MMOL/L — SIGNIFICANT CHANGE UP (ref 3.5–5.3)
RBC # BLD: 4.98 M/UL — SIGNIFICANT CHANGE UP (ref 3.8–5.2)
RBC # FLD: 15.9 % — HIGH (ref 10.3–14.5)
SODIUM SERPL-SCNC: 135 MMOL/L — SIGNIFICANT CHANGE UP (ref 135–145)
WBC # BLD: 7.56 K/UL — SIGNIFICANT CHANGE UP (ref 3.8–10.5)
WBC # FLD AUTO: 7.56 K/UL — SIGNIFICANT CHANGE UP (ref 3.8–10.5)

## 2021-03-23 PROCEDURE — 99239 HOSP IP/OBS DSCHRG MGMT >30: CPT

## 2021-03-23 PROCEDURE — 93010 ELECTROCARDIOGRAM REPORT: CPT

## 2021-03-23 RX ORDER — LOSARTAN POTASSIUM 100 MG/1
1 TABLET, FILM COATED ORAL
Qty: 30 | Refills: 3
Start: 2021-03-23 | End: 2021-07-20

## 2021-03-23 RX ORDER — ASPIRIN/CALCIUM CARB/MAGNESIUM 324 MG
1 TABLET ORAL
Qty: 30 | Refills: 0
Start: 2021-03-23 | End: 2021-04-21

## 2021-03-23 RX ORDER — SPIRONOLACTONE 25 MG/1
1 TABLET, FILM COATED ORAL
Qty: 30 | Refills: 3
Start: 2021-03-23 | End: 2021-07-20

## 2021-03-23 RX ORDER — ATORVASTATIN CALCIUM 80 MG/1
1 TABLET, FILM COATED ORAL
Qty: 30 | Refills: 3
Start: 2021-03-23 | End: 2021-07-20

## 2021-03-23 RX ORDER — METOPROLOL TARTRATE 50 MG
1 TABLET ORAL
Qty: 30 | Refills: 3
Start: 2021-03-23 | End: 2021-07-20

## 2021-03-23 RX ORDER — CLOPIDOGREL BISULFATE 75 MG/1
1 TABLET, FILM COATED ORAL
Qty: 30 | Refills: 11
Start: 2021-03-23 | End: 2022-03-17

## 2021-03-23 RX ORDER — RIVAROXABAN 15 MG-20MG
1 KIT ORAL
Qty: 30 | Refills: 6
Start: 2021-03-23 | End: 2021-10-18

## 2021-03-23 RX ADMIN — SPIRONOLACTONE 25 MILLIGRAM(S): 25 TABLET, FILM COATED ORAL at 05:28

## 2021-03-23 RX ADMIN — Medication 300 MILLIGRAM(S): at 13:52

## 2021-03-23 RX ADMIN — Medication 975 MILLIGRAM(S): at 14:08

## 2021-03-23 RX ADMIN — LORATADINE 10 MILLIGRAM(S): 10 TABLET ORAL at 14:08

## 2021-03-23 RX ADMIN — Medication 975 MILLIGRAM(S): at 05:28

## 2021-03-23 RX ADMIN — Medication 975 MILLIGRAM(S): at 13:51

## 2021-03-23 RX ADMIN — CLOPIDOGREL BISULFATE 75 MILLIGRAM(S): 75 TABLET, FILM COATED ORAL at 13:51

## 2021-03-23 RX ADMIN — Medication 1 SPRAY(S): at 05:37

## 2021-03-23 RX ADMIN — Medication 10 MILLIGRAM(S): at 14:04

## 2021-03-23 RX ADMIN — Medication 25 MILLIGRAM(S): at 05:28

## 2021-03-23 RX ADMIN — LIDOCAINE 1 PATCH: 4 CREAM TOPICAL at 14:09

## 2021-03-23 RX ADMIN — Medication 81 MILLIGRAM(S): at 13:51

## 2021-03-23 RX ADMIN — LIDOCAINE 1 PATCH: 4 CREAM TOPICAL at 14:08

## 2021-03-23 NOTE — DISCHARGE NOTE NURSING/CASE MANAGEMENT/SOCIAL WORK - NSDCFUADDAPPT_GEN_ALL_CORE_FT
Please bring your Insurance card, Photo ID and Discharge paperwork to the following appointment:    Please follow up with your Cardiac Electrophysiology Provider, Dr. Carlton Dominguez at [address] [place] [address], [address], [place] [address] on 04/22/2021 at 11:00am.    Appointment was scheduled by Ms. DEMIAN Minor, Referral Coordinator.

## 2021-03-23 NOTE — DISCHARGE NOTE NURSING/CASE MANAGEMENT/SOCIAL WORK - NSDCPEWEB_GEN_ALL_CORE
[facility] for Tobacco Control website --- [email or web address] website --- [email or web address]

## 2021-03-23 NOTE — DISCHARGE NOTE NURSING/CASE MANAGEMENT/SOCIAL WORK - NSDCPEEMAIL_GEN_ALL_CORE
[facility] for Tobacco Control email [email or web address]

## 2021-03-23 NOTE — CHART NOTE - NSCHARTNOTEFT_GEN_A_CORE
PA made aware by SW that patient refusing discharge. Transport brought multiple wheelchairs to pt at various times to assist with ambulation to lobby, and patient sent them away twice. After SW spoke to patient on why she is sending transport services away, she states "I still need to shower." Patient at that point had already taken a shower. Dr. Lieberman made aware about refusal for discharge. PA and MD went into room to discuss with patient that she is medically ready for discharge. Patient became very upset, and stated she will not leave "until hair is dry." She requested an additional half hour, and we agreed that she can have that time. She continued to be aggressive. Patient made aware that if she refuses to discharged tonight she will receive a 24hr notice. PA made aware by SW that patient refusing discharge. Transport brought multiple wheelchairs to pt at various times to assist with ambulation to lobby, and patient sent them away twice. After SW spoke to patient on why she is sending transport services away, she states "I still need to shower." Patient at that point had already taken a shower. Dr. Lieberman made aware about refusal for discharge. PA and MD went into room to discuss with patient that she is medically ready for discharge. Patient became very upset, and stated she will not leave "until hair is dry." She requested an additional half hour, and we agreed that she can have that time. She continued to be aggressive. Patient made aware that if she refuses discharge tonight she will receive a 24hr notice. PA made aware by SW that patient refusing discharge. Transport brought multiple wheelchairs to pt at various times to assist with ambulation to [facility], and patient sent them away twice. After SW spoke to patient on why she is sending transport services away, she states "I still need to shower." Patient at that point had already taken a shower. Dr. Lieberman made aware about refusal for discharge. PA and MD went into room to discuss with patient that she is medically ready for discharge. Patient became very upset, and stated she will not leave "until hair is dry." She requested an additional half hour, and we agreed that she can have that time. She continued to be aggressive. Patient made aware that if she refuses discharge tonight she will receive a 24hr notice.              I was called to bedside around 440PM for patient Rosibel Lehman due to the patient's refusal to leave the hospital. Upon arrival to the bedside, the patient was visibly distressed and hysterical. She had yelled at multiple staff members prior to my arrival including her PA, Nurse and . The patient immediately began accusing myself and our team of throwing her out of the hospital. I explained to her that it was nearly 5PM and that I had discussed her hospitalization and discharge today with her at 1030AM, at which time she was thankful of the entire staff "for saving her life". However, now she began screaming at me as well, seemingly out of nowhere, at which time I tried to calm her down and also explained that I am going to get another staff member to be a witness because I felt uncomfortable with her accusations and tone. I returned within one minute with DEONET Huertas to continue the conversation. The patient continued to scream and cry that she had asked to take a shower - which she did earlier in the day. She then complained that she wanted to take another shower but we were rushing her. She then said we were throwing her out of the hospital with "wet hair". We provided a wheelchair for the patient though she is ambulatory. She refused the wheelchair on two separate occasions. When it became obvious that there was nothing we could actually do for her - I explained that we would give her a 24-hour notice and exited the room.    Ling Lieberman MD  Cardiology Attending

## 2021-03-23 NOTE — DISCHARGE NOTE NURSING/CASE MANAGEMENT/SOCIAL WORK - PATIENT PORTAL LINK FT
You can access the FollowMyHealth Patient Portal offered by [facility] by registering at the following website: [email or web address]. By joining [employer]’s FollowMyHealth portal, you will also be able to view your health information using other applications (apps) compatible with our system.

## 2021-03-24 ENCOUNTER — APPOINTMENT (OUTPATIENT)
Dept: HEART AND VASCULAR | Facility: CLINIC | Age: 71
End: 2021-03-24
Payer: MEDICARE

## 2021-03-24 ENCOUNTER — NON-APPOINTMENT (OUTPATIENT)
Age: 71
End: 2021-03-24

## 2021-03-24 VITALS
TEMPERATURE: 97.3 F | SYSTOLIC BLOOD PRESSURE: 115 MMHG | BODY MASS INDEX: 44.9 KG/M2 | DIASTOLIC BLOOD PRESSURE: 74 MMHG | WEIGHT: 244 LBS | HEART RATE: 112 BPM | HEIGHT: 62 IN | OXYGEN SATURATION: 97 %

## 2021-03-24 VITALS — HEART RATE: 98 BPM

## 2021-03-24 DIAGNOSIS — I50.32 CHRONIC DIASTOLIC (CONGESTIVE) HEART FAILURE: ICD-10-CM

## 2021-03-24 PROCEDURE — 93000 ELECTROCARDIOGRAM COMPLETE: CPT

## 2021-03-24 PROCEDURE — 99205 OFFICE O/P NEW HI 60 MIN: CPT

## 2021-03-24 PROCEDURE — 99215 OFFICE O/P EST HI 40 MIN: CPT

## 2021-03-24 RX ORDER — TORSEMIDE 20 MG/1
20 TABLET ORAL DAILY
Refills: 0 | Status: ACTIVE | COMMUNITY
Start: 2021-03-24

## 2021-03-24 RX ORDER — SPIRONOLACTONE 25 MG/1
25 TABLET ORAL
Refills: 0 | Status: ACTIVE | COMMUNITY
Start: 2021-03-24

## 2021-03-24 RX ORDER — CLOPIDOGREL 75 MG/1
75 TABLET, FILM COATED ORAL DAILY
Qty: 90 | Refills: 3 | Status: ACTIVE | COMMUNITY
Start: 2021-03-24

## 2021-03-25 NOTE — DISCUSSION/SUMMARY
[FreeTextEntry1] : Patient's notes from [facility] reviewed. We consolidated her medication list. \par CAD cont DAPT, reviewed EKG, will bring back prior to planned staged PCI, medication compliance again stressed.\par HTN - ADDISON  and I had an extensive discussion regarding his blood pressure management. Patient will continue taking current medications in addition to maintaining a low Na diet, with periodic b/p checks at home.\par HLD ADDISON and I discussed his lipid panel and individualized target LDL goal. At this point, will do diet and exercise with anticipation of re-evaluating labs in 3-6 months\par AF cont xarelto\par CHF will repeat echo in the office to re-evaluate LVEF and better evaluate volume status provided her insurance company feels that it is a reasonable course of action and deems appropriate to approve.\par

## 2021-03-25 NOTE — PHYSICAL EXAM
[General Appearance - Well Developed] : well developed [Normal Appearance] : normal appearance [Well Groomed] : well groomed [General Appearance - Well Nourished] : well nourished [No Deformities] : no deformities [General Appearance - In No Acute Distress] : no acute distress [Normal Conjunctiva] : the conjunctiva exhibited no abnormalities [Eyelids - No Xanthelasma] : the eyelids demonstrated no xanthelasmas [Normal Oral Mucosa] : normal oral mucosa [No Oral Pallor] : no oral pallor [No Oral Cyanosis] : no oral cyanosis [Normal Jugular Venous A Waves Present] : normal jugular venous A waves present [Normal Jugular Venous V Waves Present] : normal jugular venous V waves present [No Jugular Venous Casiano A Waves] : no jugular venous casiano A waves [Heart Rate And Rhythm] : heart rate and rhythm were normal [Heart Sounds] : normal S1 and S2 [Murmurs] : no murmurs present [Respiration, Rhythm And Depth] : normal respiratory rhythm and effort [Exaggerated Use Of Accessory Muscles For Inspiration] : no accessory muscle use [Auscultation Breath Sounds / Voice Sounds] : lungs were clear to auscultation bilaterally [Abdomen Soft] : soft [Abdomen Tenderness] : non-tender [Abdomen Mass (___ Cm)] : no abdominal mass palpated [Abnormal Walk] : normal gait [Gait - Sufficient For Exercise Testing] : the gait was sufficient for exercise testing [Nail Clubbing] : no clubbing of the fingernails [Cyanosis, Localized] : no localized cyanosis [Petechial Hemorrhages (___cm)] : no petechial hemorrhages [Skin Color & Pigmentation] : normal skin color and pigmentation [] : no rash [No Venous Stasis] : no venous stasis [Skin Lesions] : no skin lesions [No Skin Ulcers] : no skin ulcer [No Xanthoma] : no  xanthoma was observed [Oriented To Time, Place, And Person] : oriented to person, place, and time [Affect] : the affect was normal [Mood] : the mood was normal [No Anxiety] : not feeling anxious [FreeTextEntry1] : right radial acess site C/D/I no bleeding no hematoma

## 2021-03-25 NOTE — REASON FOR VISIT
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 EKG by EMS revealed Afib with RVR and converted to NSR with cardizem IV X1. She was admited to [facility] for further monitoring and management of acute HF, hypokalemia an afib.  3/22/21 S/P PCI to PCI of prox LAD 95% ISR and will return for stage PCI in 4-6 weeks with Dr. Black. Here today to establish care.\par \par Denies CP but has SOB/MORRIS with walking but she attributes it to her weight. +2 BLE swelling. \par

## 2021-03-29 DIAGNOSIS — E87.6 HYPOKALEMIA: ICD-10-CM

## 2021-03-29 DIAGNOSIS — M79.7 FIBROMYALGIA: ICD-10-CM

## 2021-03-29 DIAGNOSIS — I48.91 UNSPECIFIED ATRIAL FIBRILLATION: ICD-10-CM

## 2021-03-29 DIAGNOSIS — Z79.82 LONG TERM (CURRENT) USE OF ASPIRIN: ICD-10-CM

## 2021-03-29 DIAGNOSIS — N17.9 ACUTE KIDNEY FAILURE, UNSPECIFIED: ICD-10-CM

## 2021-03-29 DIAGNOSIS — Z95.5 PRESENCE OF CORONARY ANGIOPLASTY IMPLANT AND GRAFT: ICD-10-CM

## 2021-03-29 DIAGNOSIS — I50.33 ACUTE ON CHRONIC DIASTOLIC (CONGESTIVE) HEART FAILURE: ICD-10-CM

## 2021-03-29 DIAGNOSIS — E78.5 HYPERLIPIDEMIA, UNSPECIFIED: ICD-10-CM

## 2021-03-29 DIAGNOSIS — E87.1 HYPO-OSMOLALITY AND HYPONATREMIA: ICD-10-CM

## 2021-03-29 DIAGNOSIS — I11.0 HYPERTENSIVE HEART DISEASE WITH HEART FAILURE: ICD-10-CM

## 2021-03-29 DIAGNOSIS — R07.89 OTHER CHEST PAIN: ICD-10-CM

## 2021-03-29 DIAGNOSIS — F17.210 NICOTINE DEPENDENCE, CIGARETTES, UNCOMPLICATED: ICD-10-CM

## 2021-03-29 DIAGNOSIS — I25.2 OLD MYOCARDIAL INFARCTION: ICD-10-CM

## 2021-03-29 DIAGNOSIS — I25.10 ATHEROSCLEROTIC HEART DISEASE OF NATIVE CORONARY ARTERY WITHOUT ANGINA PECTORIS: ICD-10-CM

## 2021-03-31 ENCOUNTER — APPOINTMENT (OUTPATIENT)
Dept: ORTHOPEDIC SURGERY | Facility: CLINIC | Age: 71
End: 2021-03-31

## 2021-04-02 DIAGNOSIS — Z71.89 OTHER SPECIFIED COUNSELING: ICD-10-CM

## 2021-04-03 ENCOUNTER — EMERGENCY (EMERGENCY)
Facility: HOSPITAL | Age: 71
LOS: 1 days | Discharge: HOME | End: 2021-04-03
Attending: EMERGENCY MEDICINE | Admitting: EMERGENCY MEDICINE
Payer: COMMERCIAL

## 2021-04-03 VITALS
DIASTOLIC BLOOD PRESSURE: 83 MMHG | WEIGHT: 240.08 LBS | HEART RATE: 96 BPM | RESPIRATION RATE: 15 BRPM | SYSTOLIC BLOOD PRESSURE: 121 MMHG | TEMPERATURE: 97 F | OXYGEN SATURATION: 97 % | HEIGHT: 62 IN

## 2021-04-03 DIAGNOSIS — Z86.19 PERSONAL HISTORY OF OTHER INFECTIOUS AND PARASITIC DISEASES: ICD-10-CM

## 2021-04-03 DIAGNOSIS — E78.5 HYPERLIPIDEMIA, UNSPECIFIED: ICD-10-CM

## 2021-04-03 DIAGNOSIS — Z79.2 LONG TERM (CURRENT) USE OF ANTIBIOTICS: ICD-10-CM

## 2021-04-03 DIAGNOSIS — R04.2 HEMOPTYSIS: ICD-10-CM

## 2021-04-03 DIAGNOSIS — Z79.891 LONG TERM (CURRENT) USE OF OPIATE ANALGESIC: ICD-10-CM

## 2021-04-03 DIAGNOSIS — Z87.09 PERSONAL HISTORY OF OTHER DISEASES OF THE RESPIRATORY SYSTEM: ICD-10-CM

## 2021-04-03 DIAGNOSIS — Z79.1 LONG TERM (CURRENT) USE OF NON-STEROIDAL ANTI-INFLAMMATORIES (NSAID): ICD-10-CM

## 2021-04-03 DIAGNOSIS — Z96.641 PRESENCE OF RIGHT ARTIFICIAL HIP JOINT: Chronic | ICD-10-CM

## 2021-04-03 DIAGNOSIS — Z86.010 PERSONAL HISTORY OF COLONIC POLYPS: ICD-10-CM

## 2021-04-03 DIAGNOSIS — I25.10 ATHEROSCLEROTIC HEART DISEASE OF NATIVE CORONARY ARTERY WITHOUT ANGINA PECTORIS: ICD-10-CM

## 2021-04-03 DIAGNOSIS — M50.20 OTHER CERVICAL DISC DISPLACEMENT, UNSPECIFIED CERVICAL REGION: ICD-10-CM

## 2021-04-03 DIAGNOSIS — Z79.01 LONG TERM (CURRENT) USE OF ANTICOAGULANTS: ICD-10-CM

## 2021-04-03 DIAGNOSIS — Z87.39 PERSONAL HISTORY OF OTHER DISEASES OF THE MUSCULOSKELETAL SYSTEM AND CONNECTIVE TISSUE: ICD-10-CM

## 2021-04-03 DIAGNOSIS — Z79.52 LONG TERM (CURRENT) USE OF SYSTEMIC STEROIDS: ICD-10-CM

## 2021-04-03 DIAGNOSIS — I10 ESSENTIAL (PRIMARY) HYPERTENSION: ICD-10-CM

## 2021-04-03 DIAGNOSIS — I25.2 OLD MYOCARDIAL INFARCTION: ICD-10-CM

## 2021-04-03 LAB
ALBUMIN SERPL ELPH-MCNC: 3.7 G/DL — SIGNIFICANT CHANGE UP (ref 3.4–5)
ALP SERPL-CCNC: 122 U/L — HIGH (ref 40–120)
ALT FLD-CCNC: 39 U/L — SIGNIFICANT CHANGE UP (ref 12–42)
ANION GAP SERPL CALC-SCNC: 8 MMOL/L — LOW (ref 9–16)
APTT BLD: 37.7 SEC — HIGH (ref 27.5–35.5)
AST SERPL-CCNC: 23 U/L — SIGNIFICANT CHANGE UP (ref 15–37)
BASOPHILS # BLD AUTO: 0.07 K/UL — SIGNIFICANT CHANGE UP (ref 0–0.2)
BASOPHILS NFR BLD AUTO: 0.9 % — SIGNIFICANT CHANGE UP (ref 0–2)
BILIRUB SERPL-MCNC: 0.2 MG/DL — SIGNIFICANT CHANGE UP (ref 0.2–1.2)
BUN SERPL-MCNC: 27 MG/DL — HIGH (ref 7–23)
CALCIUM SERPL-MCNC: 9.6 MG/DL — SIGNIFICANT CHANGE UP (ref 8.5–10.5)
CHLORIDE SERPL-SCNC: 103 MMOL/L — SIGNIFICANT CHANGE UP (ref 96–108)
CO2 SERPL-SCNC: 30 MMOL/L — SIGNIFICANT CHANGE UP (ref 22–31)
CREAT SERPL-MCNC: 0.99 MG/DL — SIGNIFICANT CHANGE UP (ref 0.5–1.3)
EOSINOPHIL # BLD AUTO: 0.17 K/UL — SIGNIFICANT CHANGE UP (ref 0–0.5)
EOSINOPHIL NFR BLD AUTO: 2.2 % — SIGNIFICANT CHANGE UP (ref 0–6)
GLUCOSE SERPL-MCNC: 94 MG/DL — SIGNIFICANT CHANGE UP (ref 70–99)
HCT VFR BLD CALC: 38.4 % — SIGNIFICANT CHANGE UP (ref 34.5–45)
HGB BLD-MCNC: 12.6 G/DL — SIGNIFICANT CHANGE UP (ref 11.5–15.5)
IMM GRANULOCYTES NFR BLD AUTO: 0.8 % — SIGNIFICANT CHANGE UP (ref 0–1.5)
INR BLD: 2.01 — HIGH (ref 0.88–1.16)
LYMPHOCYTES # BLD AUTO: 1.77 K/UL — SIGNIFICANT CHANGE UP (ref 1–3.3)
LYMPHOCYTES # BLD AUTO: 23.3 % — SIGNIFICANT CHANGE UP (ref 13–44)
MCHC RBC-ENTMCNC: 28.8 PG — SIGNIFICANT CHANGE UP (ref 27–34)
MCHC RBC-ENTMCNC: 32.8 GM/DL — SIGNIFICANT CHANGE UP (ref 32–36)
MCV RBC AUTO: 87.9 FL — SIGNIFICANT CHANGE UP (ref 80–100)
MONOCYTES # BLD AUTO: 0.86 K/UL — SIGNIFICANT CHANGE UP (ref 0–0.9)
MONOCYTES NFR BLD AUTO: 11.3 % — SIGNIFICANT CHANGE UP (ref 2–14)
NEUTROPHILS # BLD AUTO: 4.68 K/UL — SIGNIFICANT CHANGE UP (ref 1.8–7.4)
NEUTROPHILS NFR BLD AUTO: 61.5 % — SIGNIFICANT CHANGE UP (ref 43–77)
NRBC # BLD: 0 /100 WBCS — SIGNIFICANT CHANGE UP (ref 0–0)
NT-PROBNP SERPL-SCNC: 558 PG/ML — HIGH
PLATELET # BLD AUTO: 284 K/UL — SIGNIFICANT CHANGE UP (ref 150–400)
POTASSIUM SERPL-MCNC: 4 MMOL/L — SIGNIFICANT CHANGE UP (ref 3.5–5.3)
POTASSIUM SERPL-SCNC: 4 MMOL/L — SIGNIFICANT CHANGE UP (ref 3.5–5.3)
PROT SERPL-MCNC: 7.3 G/DL — SIGNIFICANT CHANGE UP (ref 6.4–8.2)
PROTHROM AB SERPL-ACNC: 22.9 SEC — HIGH (ref 10.6–13.6)
RBC # BLD: 4.37 M/UL — SIGNIFICANT CHANGE UP (ref 3.8–5.2)
RBC # FLD: 16 % — HIGH (ref 10.3–14.5)
SODIUM SERPL-SCNC: 141 MMOL/L — SIGNIFICANT CHANGE UP (ref 132–145)
TROPONIN I SERPL-MCNC: <0.017 NG/ML — LOW (ref 0.02–0.06)
WBC # BLD: 7.61 K/UL — SIGNIFICANT CHANGE UP (ref 3.8–10.5)
WBC # FLD AUTO: 7.61 K/UL — SIGNIFICANT CHANGE UP (ref 3.8–10.5)

## 2021-04-03 PROCEDURE — 71250 CT THORAX DX C-: CPT | Mod: 26

## 2021-04-03 PROCEDURE — 99285 EMERGENCY DEPT VISIT HI MDM: CPT

## 2021-04-03 PROCEDURE — 93010 ELECTROCARDIOGRAM REPORT: CPT

## 2021-04-03 RX ORDER — TRANEXAMIC ACID 100 MG/ML
5 INJECTION, SOLUTION INTRAVENOUS ONCE
Refills: 0 | Status: DISCONTINUED | OUTPATIENT
Start: 2021-04-03 | End: 2021-04-03

## 2021-04-03 RX ORDER — CYCLOBENZAPRINE HYDROCHLORIDE 10 MG/1
10 TABLET, FILM COATED ORAL ONCE
Refills: 0 | Status: COMPLETED | OUTPATIENT
Start: 2021-04-03 | End: 2021-04-03

## 2021-04-03 RX ORDER — MORPHINE SULFATE 50 MG/1
2 CAPSULE, EXTENDED RELEASE ORAL ONCE
Refills: 0 | Status: DISCONTINUED | OUTPATIENT
Start: 2021-04-03 | End: 2021-04-03

## 2021-04-03 RX ORDER — ACETAMINOPHEN 500 MG
975 TABLET ORAL ONCE
Refills: 0 | Status: COMPLETED | OUTPATIENT
Start: 2021-04-03 | End: 2021-04-03

## 2021-04-03 RX ADMIN — MORPHINE SULFATE 2 MILLIGRAM(S): 50 CAPSULE, EXTENDED RELEASE ORAL at 22:26

## 2021-04-03 RX ADMIN — Medication 975 MILLIGRAM(S): at 22:26

## 2021-04-03 RX ADMIN — CYCLOBENZAPRINE HYDROCHLORIDE 10 MILLIGRAM(S): 10 TABLET, FILM COATED ORAL at 22:26

## 2021-04-03 NOTE — ED PROVIDER NOTE - PATIENT PORTAL LINK FT
You can access the FollowMyHealth Patient Portal offered by [facility] by registering at the following website: [email or web address]. By joining [email or web address]’s FollowMyHealth portal, you will also be able to view your health information using other applications (apps) compatible with our system.

## 2021-04-03 NOTE — ED ADULT NURSE NOTE - OBJECTIVE STATEMENT
Pt states "Darby been coughing up blood in the morning for about 4 days now and my PMD told me to come in to be seen".

## 2021-04-03 NOTE — ED ADULT TRIAGE NOTE - CHIEF COMPLAINT QUOTE
PT brought in by EMS for complaint of "bleeding somewhere in my mouth." Pt states she is currently on Plavix and Xarelto for a recent cardiac stent replacement. STates she was told by her doctor to come to the ER. No active bleeding noted at triage.

## 2021-04-03 NOTE — ED PROVIDER NOTE - CLINICAL SUMMARY MEDICAL DECISION MAKING FREE TEXT BOX
Small amount blood tinged sputum w/out active hemoptysis in ED, SOB, CP, anemia. Pt is anticoagulated, theraputic INR. Bleeding likely 2/2 cough in setting of recent intubation for procedure and anticoagulation. f/u with pulm. no indication for further workup in ED. Will not stop AC given recent cardiac stenting.

## 2021-04-03 NOTE — ED PROVIDER NOTE - OBJECTIVE STATEMENT
69 y/o F w/hx HTN, HLD, frequent bronchitis (no COPD/asthma, non-smoker), CAD w/stents (last stent placed 5 days ago), p/w cough productive with blood tinged sputum daily for 4 days following angiography. No SOB/CP/palpitations. No n/v, pain with eating, abd pain, BRBPR or melena stool. No fever/chills. Pt fully vaccinated 3wks ago for COVID. 69 y/o F w/hx HTN, HLD, frequent bronchitis (no COPD/asthma, non-smoker), CAD w/stents (last stent placed 5 days ago) on plavix and xarelto, p/w cough productive with blood tinged sputum daily for 4 days following angiography. No SOB/CP/palpitations. No n/v, pain with eating, abd pain, BRBPR or melena stool. No fever/chills. Pt fully vaccinated 3wks ago for COVID.

## 2021-04-03 NOTE — ED PROVIDER NOTE - NSFOLLOWUPINSTRUCTIONS_ED_ALL_ED_FT
Hemoptysis    WHAT YOU NEED TO KNOW:    Hemoptysis is coughing up blood. This occurs when blood vessels in your airway or lungs weaken or break, and begin to bleed. You may bleed in small or large amounts that appear in your sputum (spit).     DISCHARGE INSTRUCTIONS:    Return to the emergency department if:   •You have new or worsening chest pain or shortness of breath.      •Your bleeding gets worse or you cough up a large amount of blood.      •You cannot stop vomiting.      •You are so dizzy that you think you may fall or faint.      •You have pain or swelling in your legs.      •Your legs and arms feel cold or look pale.      Contact your healthcare provider if:   •You have new or increasing shortness of breath.      •You have a fever.      •You lose weight without trying.      •You feel more weak and tired than usual.      •You have a cough that does not improve or gets worse.       •You have questions or concerns about your condition or care.      Medicines: You may need any of the following:   •Antibiotics may be given to fight or prevent an infection caused by bacteria. Always take your antibiotics exactly as ordered by your healthcare provider. Do not stop taking your medicine unless directed by your healthcare provider.       •Antitussives help control or stop your cough.       •Take your medicine as directed. Contact your healthcare provider if you think your medicine is not helping or if you have side effects. Tell him or her if you are allergic to any medicine. Keep a list of the medicines, vitamins, and herbs you take. Include the amounts, and when and why you take them. Bring the list or the pill bottles to follow-up visits. Carry your medicine list with you in case of an emergency.      Follow up with your healthcare provider in 2 days or as directed: You may need frequent visits to monitor your condition and prevent further blood loss. Write down your questions so you remember to ask them during your visits.    Use caution with medicines: Certain medicines, such as NSAIDs, increase your risk for bleeding. Herbal supplements also increase your risk. Examples of herbal supplements are garlic, gingko, and ginseng. Ask your healthcare provider before you take any over-the-counter medicines.     Do not smoke, and do not go to smoky areas: Smoke may worsen your hemoptysis. Nicotine and other chemicals in cigarettes and cigars can also cause lung damage. Ask your healthcare provider for information if you currently smoke and need help to quit. E-cigarettes or smokeless tobacco still contain nicotine. Talk to your healthcare provider before you use these products.

## 2021-04-06 ENCOUNTER — APPOINTMENT (OUTPATIENT)
Dept: ORTHOPEDIC SURGERY | Facility: CLINIC | Age: 71
End: 2021-04-06
Payer: MEDICARE

## 2021-04-06 ENCOUNTER — RESULT REVIEW (OUTPATIENT)
Age: 71
End: 2021-04-06

## 2021-04-06 ENCOUNTER — OUTPATIENT (OUTPATIENT)
Dept: OUTPATIENT SERVICES | Facility: HOSPITAL | Age: 71
LOS: 1 days | End: 2021-04-06
Payer: COMMERCIAL

## 2021-04-06 VITALS — HEIGHT: 62 IN | WEIGHT: 224 LBS | BODY MASS INDEX: 41.22 KG/M2

## 2021-04-06 DIAGNOSIS — S42.201S UNSPECIFIED FRACTURE OF UPPER END OF RIGHT HUMERUS, SEQUELA: ICD-10-CM

## 2021-04-06 DIAGNOSIS — M75.02 ADHESIVE CAPSULITIS OF LEFT SHOULDER: ICD-10-CM

## 2021-04-06 DIAGNOSIS — Z96.641 PRESENCE OF RIGHT ARTIFICIAL HIP JOINT: Chronic | ICD-10-CM

## 2021-04-06 PROCEDURE — 73030 X-RAY EXAM OF SHOULDER: CPT

## 2021-04-06 PROCEDURE — 99214 OFFICE O/P EST MOD 30 MIN: CPT

## 2021-04-06 PROCEDURE — 99072 ADDL SUPL MATRL&STAF TM PHE: CPT

## 2021-04-06 PROCEDURE — 73030 X-RAY EXAM OF SHOULDER: CPT | Mod: 26,50

## 2021-04-12 ENCOUNTER — EMERGENCY (EMERGENCY)
Facility: HOSPITAL | Age: 71
LOS: 1 days | Discharge: HOME | End: 2021-04-12
Attending: EMERGENCY MEDICINE | Admitting: EMERGENCY MEDICINE
Payer: COMMERCIAL

## 2021-04-12 VITALS
RESPIRATION RATE: 16 BRPM | DIASTOLIC BLOOD PRESSURE: 78 MMHG | OXYGEN SATURATION: 96 % | SYSTOLIC BLOOD PRESSURE: 124 MMHG | TEMPERATURE: 98 F | HEART RATE: 87 BPM

## 2021-04-12 VITALS
HEIGHT: 62 IN | RESPIRATION RATE: 18 BRPM | HEART RATE: 112 BPM | SYSTOLIC BLOOD PRESSURE: 145 MMHG | OXYGEN SATURATION: 93 % | DIASTOLIC BLOOD PRESSURE: 84 MMHG | TEMPERATURE: 99 F

## 2021-04-12 DIAGNOSIS — I25.2 OLD MYOCARDIAL INFARCTION: ICD-10-CM

## 2021-04-12 DIAGNOSIS — E78.5 HYPERLIPIDEMIA, UNSPECIFIED: ICD-10-CM

## 2021-04-12 DIAGNOSIS — M25.512 PAIN IN LEFT SHOULDER: ICD-10-CM

## 2021-04-12 DIAGNOSIS — I25.10 ATHEROSCLEROTIC HEART DISEASE OF NATIVE CORONARY ARTERY WITHOUT ANGINA PECTORIS: ICD-10-CM

## 2021-04-12 DIAGNOSIS — R00.0 TACHYCARDIA, UNSPECIFIED: ICD-10-CM

## 2021-04-12 DIAGNOSIS — M79.7 FIBROMYALGIA: ICD-10-CM

## 2021-04-12 DIAGNOSIS — I50.32 CHRONIC DIASTOLIC (CONGESTIVE) HEART FAILURE: ICD-10-CM

## 2021-04-12 DIAGNOSIS — F17.200 NICOTINE DEPENDENCE, UNSPECIFIED, UNCOMPLICATED: ICD-10-CM

## 2021-04-12 DIAGNOSIS — R06.02 SHORTNESS OF BREATH: ICD-10-CM

## 2021-04-12 DIAGNOSIS — Z96.641 PRESENCE OF RIGHT ARTIFICIAL HIP JOINT: Chronic | ICD-10-CM

## 2021-04-12 DIAGNOSIS — R11.2 NAUSEA WITH VOMITING, UNSPECIFIED: ICD-10-CM

## 2021-04-12 DIAGNOSIS — Z79.52 LONG TERM (CURRENT) USE OF SYSTEMIC STEROIDS: ICD-10-CM

## 2021-04-12 DIAGNOSIS — R00.2 PALPITATIONS: ICD-10-CM

## 2021-04-12 DIAGNOSIS — Z79.2 LONG TERM (CURRENT) USE OF ANTIBIOTICS: ICD-10-CM

## 2021-04-12 DIAGNOSIS — I11.0 HYPERTENSIVE HEART DISEASE WITH HEART FAILURE: ICD-10-CM

## 2021-04-12 DIAGNOSIS — Z79.891 LONG TERM (CURRENT) USE OF OPIATE ANALGESIC: ICD-10-CM

## 2021-04-12 LAB
ALBUMIN SERPL ELPH-MCNC: 3.7 G/DL — SIGNIFICANT CHANGE UP (ref 3.4–5)
ALP SERPL-CCNC: 135 U/L — HIGH (ref 40–120)
ALT FLD-CCNC: 63 U/L — HIGH (ref 12–42)
ANION GAP SERPL CALC-SCNC: 8 MMOL/L — LOW (ref 9–16)
APPEARANCE UR: CLEAR — SIGNIFICANT CHANGE UP
AST SERPL-CCNC: 34 U/L — SIGNIFICANT CHANGE UP (ref 15–37)
BASOPHILS # BLD AUTO: 0.07 K/UL — SIGNIFICANT CHANGE UP (ref 0–0.2)
BASOPHILS NFR BLD AUTO: 0.6 % — SIGNIFICANT CHANGE UP (ref 0–2)
BILIRUB SERPL-MCNC: 0.2 MG/DL — SIGNIFICANT CHANGE UP (ref 0.2–1.2)
BILIRUB UR-MCNC: NEGATIVE — SIGNIFICANT CHANGE UP
BUN SERPL-MCNC: 31 MG/DL — HIGH (ref 7–23)
CALCIUM SERPL-MCNC: 8.9 MG/DL — SIGNIFICANT CHANGE UP (ref 8.5–10.5)
CHLORIDE SERPL-SCNC: 105 MMOL/L — SIGNIFICANT CHANGE UP (ref 96–108)
CO2 SERPL-SCNC: 30 MMOL/L — SIGNIFICANT CHANGE UP (ref 22–31)
COLOR SPEC: YELLOW — SIGNIFICANT CHANGE UP
CREAT SERPL-MCNC: 1.06 MG/DL — SIGNIFICANT CHANGE UP (ref 0.5–1.3)
DIFF PNL FLD: NEGATIVE — SIGNIFICANT CHANGE UP
EOSINOPHIL # BLD AUTO: 0.11 K/UL — SIGNIFICANT CHANGE UP (ref 0–0.5)
EOSINOPHIL NFR BLD AUTO: 1 % — SIGNIFICANT CHANGE UP (ref 0–6)
GLUCOSE SERPL-MCNC: 130 MG/DL — HIGH (ref 70–99)
GLUCOSE UR QL: NEGATIVE — SIGNIFICANT CHANGE UP
HCT VFR BLD CALC: 40.5 % — SIGNIFICANT CHANGE UP (ref 34.5–45)
HGB BLD-MCNC: 13.1 G/DL — SIGNIFICANT CHANGE UP (ref 11.5–15.5)
IMM GRANULOCYTES NFR BLD AUTO: 0.7 % — SIGNIFICANT CHANGE UP (ref 0–1.5)
KETONES UR-MCNC: NEGATIVE — SIGNIFICANT CHANGE UP
LEUKOCYTE ESTERASE UR-ACNC: NEGATIVE — SIGNIFICANT CHANGE UP
LYMPHOCYTES # BLD AUTO: 1.39 K/UL — SIGNIFICANT CHANGE UP (ref 1–3.3)
LYMPHOCYTES # BLD AUTO: 12.4 % — LOW (ref 13–44)
MAGNESIUM SERPL-MCNC: 2.3 MG/DL — SIGNIFICANT CHANGE UP (ref 1.6–2.6)
MCHC RBC-ENTMCNC: 28.9 PG — SIGNIFICANT CHANGE UP (ref 27–34)
MCHC RBC-ENTMCNC: 32.3 GM/DL — SIGNIFICANT CHANGE UP (ref 32–36)
MCV RBC AUTO: 89.2 FL — SIGNIFICANT CHANGE UP (ref 80–100)
MONOCYTES # BLD AUTO: 0.87 K/UL — SIGNIFICANT CHANGE UP (ref 0–0.9)
MONOCYTES NFR BLD AUTO: 7.8 % — SIGNIFICANT CHANGE UP (ref 2–14)
NEUTROPHILS # BLD AUTO: 8.7 K/UL — HIGH (ref 1.8–7.4)
NEUTROPHILS NFR BLD AUTO: 77.5 % — HIGH (ref 43–77)
NITRITE UR-MCNC: NEGATIVE — SIGNIFICANT CHANGE UP
NRBC # BLD: 0 /100 WBCS — SIGNIFICANT CHANGE UP (ref 0–0)
PH UR: 6 — SIGNIFICANT CHANGE UP (ref 5–8)
PLATELET # BLD AUTO: 288 K/UL — SIGNIFICANT CHANGE UP (ref 150–400)
POTASSIUM SERPL-MCNC: 5.1 MMOL/L — SIGNIFICANT CHANGE UP (ref 3.5–5.3)
POTASSIUM SERPL-SCNC: 5.1 MMOL/L — SIGNIFICANT CHANGE UP (ref 3.5–5.3)
PROT SERPL-MCNC: 7.7 G/DL — SIGNIFICANT CHANGE UP (ref 6.4–8.2)
PROT UR-MCNC: NEGATIVE MG/DL — SIGNIFICANT CHANGE UP
RBC # BLD: 4.54 M/UL — SIGNIFICANT CHANGE UP (ref 3.8–5.2)
RBC # FLD: 17.1 % — HIGH (ref 10.3–14.5)
SODIUM SERPL-SCNC: 143 MMOL/L — SIGNIFICANT CHANGE UP (ref 132–145)
SP GR SPEC: 1.01 — SIGNIFICANT CHANGE UP (ref 1–1.03)
TROPONIN I SERPL-MCNC: 0.03 NG/ML — SIGNIFICANT CHANGE UP (ref 0.02–0.06)
TROPONIN I SERPL-MCNC: <0.017 NG/ML — LOW (ref 0.02–0.06)
UROBILINOGEN FLD QL: 0.2 E.U./DL — SIGNIFICANT CHANGE UP
WBC # BLD: 11.22 K/UL — HIGH (ref 3.8–10.5)
WBC # FLD AUTO: 11.22 K/UL — HIGH (ref 3.8–10.5)

## 2021-04-12 PROCEDURE — 99285 EMERGENCY DEPT VISIT HI MDM: CPT | Mod: 25

## 2021-04-12 PROCEDURE — 93010 ELECTROCARDIOGRAM REPORT: CPT | Mod: 76

## 2021-04-12 PROCEDURE — 71045 X-RAY EXAM CHEST 1 VIEW: CPT | Mod: 26

## 2021-04-12 RX ORDER — METHOCARBAMOL 500 MG/1
1000 TABLET, FILM COATED ORAL ONCE
Refills: 0 | Status: COMPLETED | OUTPATIENT
Start: 2021-04-12 | End: 2021-04-12

## 2021-04-12 RX ADMIN — METHOCARBAMOL 1000 MILLIGRAM(S): 500 TABLET, FILM COATED ORAL at 13:43

## 2021-04-12 NOTE — ED ADULT NURSE NOTE - CHIEF COMPLAINT QUOTE
pt called 911 sudden onset of palpitations, had stents at [facility] @ 2/52, ekg in field sinus tachy @ 112 , states palpitations have since resolved, smoker 20 a day , took am meds, on xarelto , azt, lasix, losartan, pt palpable regular pulse radial at triage, gcs 15, talking in full sentences

## 2021-04-12 NOTE — ED PROVIDER NOTE - PATIENT PORTAL LINK FT
You can access the FollowMyHealth Patient Portal offered by [facility] by registering at the following website: [email or web address]. By joining [employer]’s FollowMyHealth portal, you will also be able to view your health information using other applications (apps) compatible with our system.

## 2021-04-12 NOTE — ED ADULT NURSE NOTE - OBJECTIVE STATEMENT
Pt aox3 b/l shoulder pain that "caused my heart rate to go up".Pt denies chest pain or sob. Pt states she has chronic shoulder pain.

## 2021-04-12 NOTE — ED PROVIDER NOTE - OBJECTIVE STATEMENT
71 y/o F, current smoker, has a PMH of CAD, chronic diastolic CHF, HTN, HLD, and fibromyalgia. Pt presents today with palpations. Pt states it began 6:30 this morning and woke her up from sleep and has resolved since arrival to the ED. Pt endorses exertional SOB, states she walked down 6 flights of stairs to get to the ED and felt short of breath. Denies fevers, chills, cough. Pt states she is compliant with all medications, and today with 1 episode of vomiting. Last seen here x8 days ago for a cough. Pt was seen here approximately x1 month ago for palpations and weakness and presented with A-fib with rapid ventricular rate. Pt had a cardiac catheretization on March 22 with a laser atherectomy and stent to proximal LAD with residual Mid RCA 50-60% stenosis with possible plan for staging PCI. Pt daily medications include: EC aspirin 81mg, Atorvastatin 40mg oral tab, losartan 50mg, Metroprolol succinate ER 50mg, Flavix 75mg, Rivaroxaban 20mg, and Spironolactone 25mg.   torsemide 10mg   vanlafaxine 150mg

## 2021-04-12 NOTE — ED PROVIDER NOTE - PMH
Colon polyp    Coronary artery disease    Fibromyalgia    Hepatitis C    Herniated disc, cervical    HLD (hyperlipidemia)    HTN (hypertension)    Myocardial infarction, unspecified MI type, unspecified artery

## 2021-04-12 NOTE — ED PROVIDER NOTE - CLINICAL SUMMARY MEDICAL DECISION MAKING FREE TEXT BOX
69 y/o F with a complex medical history presents with acute onset of palpations since morning which has since resolved. She also endorses exertional dyspnea and endorses compliance with home medications. Received second COVID vaccine 3 weeks ago. On arrival to ED heart rate was 112 and on EKG tachycardic. On exam lungs are clear, normal muscle use, and non pitting LE edema. Plan is to review extensive medical records, place on cardiac monitor, check X-ray, do blood work and reach out to her medical team.

## 2021-04-12 NOTE — ED PROVIDER NOTE - NEURO NEGATIVE STATEMENT, MLM
no loss of consciousness, no gait abnormality, no headache, no sensory deficits, and no weakness. no headache, no sensory deficits, and no weakness.

## 2021-04-12 NOTE — ED PROVIDER NOTE - PROGRESS NOTE DETAILS
Observed in the ED, cardiac monitor without events.  labs and imaging reviewed.  Trop wnl/negative x 2.  Repeat EKG remains non ischemic.  Reviewed extensive medical history and discussed care with her cardiologist Dr. Man.  Remains symptom free.  HR in the low 80s.  Has scheduled follow up appt next week.  Conservative management discussed with the patient in detail.  Close follow up encouraged.  Strict ED return instructions discussed in detail and patient given the opportunity to ask any questions about their discharge diagnosis and instructions

## 2021-04-12 NOTE — ED ADULT TRIAGE NOTE - CHIEF COMPLAINT QUOTE
pt called 911 sudden onset of palpitations, had stents at [facility] @ 2/52, ekg in field sinus tachy @ 112 , stats palpitations have since resolved, smoker 20 a day , took am meds, on xarelto , azt, lasix, losartan, pt palpable regular pulse radial at triage pt called 911 sudden onset of palpitations, had stents at [facility] @ 2/52, ekg in field sinus tachy @ 112 , states palpitations have since resolved, smoker 20 a day , took am meds, on xarelto , azt, lasix, losartan, pt palpable regular pulse radial at triage, gcs 15, talking in full sentences

## 2021-04-19 ENCOUNTER — APPOINTMENT (OUTPATIENT)
Dept: HEART AND VASCULAR | Facility: CLINIC | Age: 71
End: 2021-04-19
Payer: MEDICARE

## 2021-04-19 VITALS
HEART RATE: 110 BPM | SYSTOLIC BLOOD PRESSURE: 111 MMHG | OXYGEN SATURATION: 95 % | TEMPERATURE: 97 F | DIASTOLIC BLOOD PRESSURE: 77 MMHG | HEIGHT: 62 IN | BODY MASS INDEX: 44.16 KG/M2 | WEIGHT: 240 LBS

## 2021-04-19 DIAGNOSIS — Z98.61 ATHEROSCLEROTIC HEART DISEASE OF NATIVE CORONARY ARTERY W/OUT ANGINA PECTORIS: ICD-10-CM

## 2021-04-19 DIAGNOSIS — E78.5 HYPERLIPIDEMIA, UNSPECIFIED: ICD-10-CM

## 2021-04-19 DIAGNOSIS — I10 ESSENTIAL (PRIMARY) HYPERTENSION: ICD-10-CM

## 2021-04-19 DIAGNOSIS — I25.10 ATHEROSCLEROTIC HEART DISEASE OF NATIVE CORONARY ARTERY W/OUT ANGINA PECTORIS: ICD-10-CM

## 2021-04-19 DIAGNOSIS — I48.91 UNSPECIFIED ATRIAL FIBRILLATION: ICD-10-CM

## 2021-04-19 PROCEDURE — 93306 TTE W/DOPPLER COMPLETE: CPT

## 2021-04-19 PROCEDURE — 99072 ADDL SUPL MATRL&STAF TM PHE: CPT

## 2021-04-19 PROCEDURE — 99214 OFFICE O/P EST MOD 30 MIN: CPT

## 2021-04-20 PROBLEM — I10 BENIGN ESSENTIAL HYPERTENSION: Status: ACTIVE | Noted: 2021-03-25

## 2021-04-20 PROBLEM — E78.5 HYPERLIPIDEMIA, UNSPECIFIED: Chronic | Status: ACTIVE | Noted: 2021-04-12

## 2021-04-20 PROBLEM — I10 ESSENTIAL (PRIMARY) HYPERTENSION: Chronic | Status: ACTIVE | Noted: 2021-04-12

## 2021-04-20 PROBLEM — I25.10 CAD S/P PERCUTANEOUS CORONARY ANGIOPLASTY: Status: ACTIVE | Noted: 2021-03-24

## 2021-04-20 PROBLEM — E78.5 HYPERLIPIDEMIA LDL GOAL <70: Status: ACTIVE | Noted: 2021-03-25

## 2021-04-20 NOTE — REASON FOR VISIT
[Follow-Up - Clinic] : a clinic follow-up of [Coronary Artery Disease] : coronary artery disease [Hyperlipidemia] : hyperlipidemia [Hypertension] : hypertension [FreeTextEntry1] : 70 year old female presents for a follow up and re-evaluation. This is her second visit with me. Since last visit she has been to ER twice and there were several calls to the office. We stopped her ASA secondary to a few instances of seeing minor amount of blood while coughing (as per her home nurse). Patient completed an echocardiogram today c/w preserved LV systolic function. She is unable to verify her medications but states that she is taking all of them. Her complains are fatigue, shortness of breath, edema, back pain, shoulder pain, chest discomfort, body aches. Patient is tearful, not wearing a mask today. Not cooperating with the echo technician during the study. She states that she has been doing research on line, she wants to know her exact prognosis and life expectancy.  Although she remarks on taking her medications, she admits not taking all of them today. She bought compression socks, but did not try them.

## 2021-04-20 NOTE — DISCUSSION/SUMMARY
[FreeTextEntry1] : HTN blood pressure appears to be reasonable on this follow up; medication list provided, recommend compliance with medications and confirming the dosing\par CAD cont plavix, holding ASA\par CHF, diastolic echocardiogram reviewed, explained that diuretics and beta blockers are standard of care; however, in addition, her symptoms are multifactorial and input from Primary Care and Rheumatology would be of value. Information for Rheumatology provided\par HLD cont statin \par In terms of prognosis and overall picture, she situation presents a multifactorial picture. I think a team effort with Rheumatology, Primary Care and Psych would be of value. There is a definite issue from my impression of expectation management. Will be happy to see in a few months.

## 2021-04-20 NOTE — PHYSICAL EXAM
[General Appearance - Well Developed] : well developed [Normal Appearance] : normal appearance [General Appearance - Well Nourished] : well nourished [Well Groomed] : well groomed [No Deformities] : no deformities [General Appearance - In No Acute Distress] : no acute distress [Normal Conjunctiva] : the conjunctiva exhibited no abnormalities [Eyelids - No Xanthelasma] : the eyelids demonstrated no xanthelasmas [Normal Oral Mucosa] : normal oral mucosa [No Oral Pallor] : no oral pallor [No Oral Cyanosis] : no oral cyanosis [Normal Jugular Venous A Waves Present] : normal jugular venous A waves present [Normal Jugular Venous V Waves Present] : normal jugular venous V waves present [No Jugular Venous Casiano A Waves] : no jugular venous casiano A waves [Respiration, Rhythm And Depth] : normal respiratory rhythm and effort [Auscultation Breath Sounds / Voice Sounds] : lungs were clear to auscultation bilaterally [Exaggerated Use Of Accessory Muscles For Inspiration] : no accessory muscle use [Heart Rate And Rhythm] : heart rate and rhythm were normal [Heart Sounds] : normal S1 and S2 [Murmurs] : no murmurs present [Abdomen Soft] : soft [Abdomen Tenderness] : non-tender [Abdomen Mass (___ Cm)] : no abdominal mass palpated [Abnormal Walk] : normal gait [Gait - Sufficient For Exercise Testing] : the gait was sufficient for exercise testing [Nail Clubbing] : no clubbing of the fingernails [Cyanosis, Localized] : no localized cyanosis [Petechial Hemorrhages (___cm)] : no petechial hemorrhages [Oriented To Time, Place, And Person] : oriented to person, place, and time [] : no ischemic changes [Mood] : the mood was normal [FreeTextEntry1] : +2 edema noted

## 2021-04-22 ENCOUNTER — APPOINTMENT (OUTPATIENT)
Dept: HEART AND VASCULAR | Facility: CLINIC | Age: 71
End: 2021-04-22

## 2021-05-17 ENCOUNTER — APPOINTMENT (OUTPATIENT)
Dept: RHEUMATOLOGY | Facility: CLINIC | Age: 71
End: 2021-05-17

## 2021-07-05 NOTE — PROGRESS NOTE ADULT - PROBLEM SELECTOR PROBLEM 7
Hyperlipidemia
Hypertension
Hypertension
Hyperlipidemia
Hypertension
Hypertension
Yes - the patient is able to be screened

## 2021-07-06 ENCOUNTER — RX RENEWAL (OUTPATIENT)
Age: 71
End: 2021-07-06

## 2021-07-06 RX ORDER — RIVAROXABAN 20 MG/1
20 TABLET, FILM COATED ORAL
Qty: 90 | Refills: 3 | Status: ACTIVE | COMMUNITY
Start: 2021-03-24 | End: 1900-01-01

## 2021-08-16 ENCOUNTER — APPOINTMENT (OUTPATIENT)
Dept: ORTHOPEDIC SURGERY | Facility: CLINIC | Age: 71
End: 2021-08-16

## 2021-08-20 ENCOUNTER — APPOINTMENT (OUTPATIENT)
Dept: CT IMAGING | Facility: CLINIC | Age: 71
End: 2021-08-20
Payer: MEDICARE

## 2021-08-20 ENCOUNTER — OUTPATIENT (OUTPATIENT)
Dept: OUTPATIENT SERVICES | Facility: HOSPITAL | Age: 71
LOS: 1 days | End: 2021-08-20

## 2021-08-20 DIAGNOSIS — Z96.641 PRESENCE OF RIGHT ARTIFICIAL HIP JOINT: Chronic | ICD-10-CM

## 2021-08-20 PROCEDURE — 71250 CT THORAX DX C-: CPT | Mod: 26

## 2021-08-23 ENCOUNTER — INPATIENT (INPATIENT)
Facility: HOSPITAL | Age: 71
LOS: 3 days | Discharge: NURSING, REHAB OR LONG-TERM CARE | DRG: 286 | End: 2021-08-27
Attending: INTERNAL MEDICINE | Admitting: INTERNAL MEDICINE
Payer: MEDICARE

## 2021-08-23 VITALS
WEIGHT: 265 LBS | RESPIRATION RATE: 18 BRPM | HEART RATE: 108 BPM | OXYGEN SATURATION: 96 % | SYSTOLIC BLOOD PRESSURE: 128 MMHG | TEMPERATURE: 98 F | DIASTOLIC BLOOD PRESSURE: 77 MMHG | HEIGHT: 62 IN

## 2021-08-23 DIAGNOSIS — Z96.641 PRESENCE OF RIGHT ARTIFICIAL HIP JOINT: Chronic | ICD-10-CM

## 2021-08-23 LAB
ALBUMIN SERPL ELPH-MCNC: 3.6 G/DL — SIGNIFICANT CHANGE UP (ref 3.4–5)
ALP SERPL-CCNC: 161 U/L — HIGH (ref 40–120)
ALT FLD-CCNC: 67 U/L — HIGH (ref 12–42)
ANION GAP SERPL CALC-SCNC: 8 MMOL/L — LOW (ref 9–16)
APTT BLD: 30.6 SEC — SIGNIFICANT CHANGE UP (ref 27.5–35.5)
AST SERPL-CCNC: 37 U/L — SIGNIFICANT CHANGE UP (ref 15–37)
BASOPHILS # BLD AUTO: 0.06 K/UL — SIGNIFICANT CHANGE UP (ref 0–0.2)
BASOPHILS NFR BLD AUTO: 0.5 % — SIGNIFICANT CHANGE UP (ref 0–2)
BILIRUB SERPL-MCNC: 0.3 MG/DL — SIGNIFICANT CHANGE UP (ref 0.2–1.2)
BUN SERPL-MCNC: 52 MG/DL — HIGH (ref 7–23)
CALCIUM SERPL-MCNC: 9.9 MG/DL — SIGNIFICANT CHANGE UP (ref 8.5–10.5)
CHLORIDE SERPL-SCNC: 96 MMOL/L — SIGNIFICANT CHANGE UP (ref 96–108)
CO2 SERPL-SCNC: 35 MMOL/L — HIGH (ref 22–31)
CREAT SERPL-MCNC: 1.53 MG/DL — HIGH (ref 0.5–1.3)
D DIMER BLD IA.RAPID-MCNC: 192 NG/ML DDU — SIGNIFICANT CHANGE UP
EOSINOPHIL # BLD AUTO: 0.25 K/UL — SIGNIFICANT CHANGE UP (ref 0–0.5)
EOSINOPHIL NFR BLD AUTO: 2.1 % — SIGNIFICANT CHANGE UP (ref 0–6)
GLUCOSE SERPL-MCNC: 131 MG/DL — HIGH (ref 70–99)
HCT VFR BLD CALC: 41.1 % — SIGNIFICANT CHANGE UP (ref 34.5–45)
HGB BLD-MCNC: 13.5 G/DL — SIGNIFICANT CHANGE UP (ref 11.5–15.5)
IMM GRANULOCYTES NFR BLD AUTO: 0.5 % — SIGNIFICANT CHANGE UP (ref 0–1.5)
INR BLD: 1.52 — HIGH (ref 0.88–1.16)
LYMPHOCYTES # BLD AUTO: 19.9 % — SIGNIFICANT CHANGE UP (ref 13–44)
LYMPHOCYTES # BLD AUTO: 2.38 K/UL — SIGNIFICANT CHANGE UP (ref 1–3.3)
MCHC RBC-ENTMCNC: 28.6 PG — SIGNIFICANT CHANGE UP (ref 27–34)
MCHC RBC-ENTMCNC: 32.8 GM/DL — SIGNIFICANT CHANGE UP (ref 32–36)
MCV RBC AUTO: 87.1 FL — SIGNIFICANT CHANGE UP (ref 80–100)
MONOCYTES # BLD AUTO: 1.18 K/UL — HIGH (ref 0–0.9)
MONOCYTES NFR BLD AUTO: 9.9 % — SIGNIFICANT CHANGE UP (ref 2–14)
NEUTROPHILS # BLD AUTO: 8.02 K/UL — HIGH (ref 1.8–7.4)
NEUTROPHILS NFR BLD AUTO: 67.1 % — SIGNIFICANT CHANGE UP (ref 43–77)
NRBC # BLD: 0 /100 WBCS — SIGNIFICANT CHANGE UP (ref 0–0)
NT-PROBNP SERPL-SCNC: 201 PG/ML — SIGNIFICANT CHANGE UP
PLATELET # BLD AUTO: 349 K/UL — SIGNIFICANT CHANGE UP (ref 150–400)
POTASSIUM SERPL-MCNC: 3 MMOL/L — LOW (ref 3.5–5.3)
POTASSIUM SERPL-SCNC: 3 MMOL/L — LOW (ref 3.5–5.3)
PROT SERPL-MCNC: 8.2 G/DL — SIGNIFICANT CHANGE UP (ref 6.4–8.2)
PROTHROM AB SERPL-ACNC: 17.6 SEC — HIGH (ref 10.6–13.6)
RBC # BLD: 4.72 M/UL — SIGNIFICANT CHANGE UP (ref 3.8–5.2)
RBC # FLD: 15.2 % — HIGH (ref 10.3–14.5)
SARS-COV-2 RNA SPEC QL NAA+PROBE: SIGNIFICANT CHANGE UP
SODIUM SERPL-SCNC: 139 MMOL/L — SIGNIFICANT CHANGE UP (ref 132–145)
TROPONIN I SERPL-MCNC: <0.017 NG/ML — LOW (ref 0.02–0.06)
TSH SERPL-MCNC: 1.23 UIU/ML — SIGNIFICANT CHANGE UP (ref 0.36–3.74)
WBC # BLD: 11.95 K/UL — HIGH (ref 3.8–10.5)
WBC # FLD AUTO: 11.95 K/UL — HIGH (ref 3.8–10.5)

## 2021-08-23 PROCEDURE — 71046 X-RAY EXAM CHEST 2 VIEWS: CPT | Mod: 26

## 2021-08-23 PROCEDURE — 93010 ELECTROCARDIOGRAM REPORT: CPT

## 2021-08-23 PROCEDURE — 99285 EMERGENCY DEPT VISIT HI MDM: CPT | Mod: 25

## 2021-08-23 RX ORDER — DILTIAZEM HCL 120 MG
30 CAPSULE, EXT RELEASE 24 HR ORAL ONCE
Refills: 0 | Status: COMPLETED | OUTPATIENT
Start: 2021-08-23 | End: 2021-08-23

## 2021-08-23 RX ORDER — POTASSIUM CHLORIDE 20 MEQ
40 PACKET (EA) ORAL ONCE
Refills: 0 | Status: COMPLETED | OUTPATIENT
Start: 2021-08-23 | End: 2021-08-23

## 2021-08-23 RX ORDER — DILTIAZEM HCL 120 MG
60 CAPSULE, EXT RELEASE 24 HR ORAL ONCE
Refills: 0 | Status: COMPLETED | OUTPATIENT
Start: 2021-08-23 | End: 2021-08-23

## 2021-08-23 RX ORDER — MORPHINE SULFATE 50 MG/1
4 CAPSULE, EXTENDED RELEASE ORAL ONCE
Refills: 0 | Status: DISCONTINUED | OUTPATIENT
Start: 2021-08-23 | End: 2021-08-23

## 2021-08-23 RX ORDER — SODIUM CHLORIDE 9 MG/ML
1000 INJECTION, SOLUTION INTRAVENOUS ONCE
Refills: 0 | Status: COMPLETED | OUTPATIENT
Start: 2021-08-23 | End: 2021-08-23

## 2021-08-23 RX ADMIN — MORPHINE SULFATE 4 MILLIGRAM(S): 50 CAPSULE, EXTENDED RELEASE ORAL at 20:31

## 2021-08-23 RX ADMIN — Medication 40 MILLIEQUIVALENT(S): at 21:50

## 2021-08-23 RX ADMIN — SODIUM CHLORIDE 1000 MILLILITER(S): 9 INJECTION, SOLUTION INTRAVENOUS at 20:42

## 2021-08-23 RX ADMIN — Medication 30 MILLIGRAM(S): at 20:32

## 2021-08-23 RX ADMIN — Medication 60 MILLIGRAM(S): at 21:50

## 2021-08-23 NOTE — ED PROVIDER NOTE - PHYSICAL EXAMINATION
Physical Exam    Vital Signs: I have reviewed the initial vital signs.  Constitutional: well-nourished, appears stated age, no acute distress  Eyes: PERRLA, and symmetrical lids.  ENT: Neck supple with no adenopathy, moist MM.  Cardiovascular: rapid afib hr 164, well-perfused extremities  Respiratory: unlabored respiratory effort, clear to auscultation bilaterally  Gastrointestinal: soft, non-tender abdomen, no pulsatile mass  Musculoskeletal: supple neck, +b/l LE edema  Integumentary: warm, dry, no rash  Neurologic: extremities’ motor and sensory functions grossly intact  Psychiatric: A&Ox3

## 2021-08-23 NOTE — ED PROVIDER NOTE - OBJECTIVE STATEMENT
70 yo f pmhx sig for htn, hld, cad, afib pw acute onset of midsternal chest pain and sob x2 hr in duration with  that had no provoking or modifying factors. On arrival to the ED pt is tachypenic with HR of 162 with no signs of distress and saturating 92% on room air. No n/v, no diaphoresis. No unilateral swelling, hemoptysis, estogen supplementation, malignancy, recent immobilization or surgery, or prior DVT/PE.    I have reviewed available current nursing and previous documentation of past medical, surgical, family, and/or social history.

## 2021-08-23 NOTE — ED PROVIDER NOTE - CLINICAL SUMMARY MEDICAL DECISION MAKING FREE TEXT BOX
Pt is a 70yo f pmhx sig for htn, hld, cad, afib and hf pw rapid afib hr of 164 bpm with c/o chest pain and sob, tachypneic on exam.  Pt has clear lung sounds and LE edema. Trop neg after cardizem 0.25 mg/kg HR improved to 110 bpm.

## 2021-08-23 NOTE — ED ADULT NURSE REASSESSMENT NOTE - NS ED NURSE REASSESS COMMENT FT1
Pt resting comfortably in stretcher, maintained on continuous cardiac monitoring. VSS. Breathing spontaneous and nonlabored. Awaiting transfer to [facility].

## 2021-08-23 NOTE — ED PROVIDER NOTE - ATTENDING CONTRIBUTION TO CARE
71 year old female with htn, hld, cad that was supposed to have a cath 1 month ago, but did not, afib, came in because of CP and rapid afib. improved after cardizem here, but requires admission for monitoring and ACS with high heart score.

## 2021-08-23 NOTE — ED ADULT NURSE NOTE - OBJECTIVE STATEMENT
72 yo F c/o chest pain. Pt reports hx of a fib, CHF, and c/o sudden onset CP and SOB. Noted tachypnea and peripheral edema. Edema nonpitting, pulses palpable. Denies N/V/D, headache, dizziness, fever/chills, numbness/tingling, change in bowel or bladder habits. Pt speaking in full complete sentences, +PARIS.

## 2021-08-24 DIAGNOSIS — I48.91 UNSPECIFIED ATRIAL FIBRILLATION: ICD-10-CM

## 2021-08-24 DIAGNOSIS — I25.10 ATHEROSCLEROTIC HEART DISEASE OF NATIVE CORONARY ARTERY WITHOUT ANGINA PECTORIS: ICD-10-CM

## 2021-08-24 DIAGNOSIS — R09.02 HYPOXEMIA: ICD-10-CM

## 2021-08-24 DIAGNOSIS — I10 ESSENTIAL (PRIMARY) HYPERTENSION: ICD-10-CM

## 2021-08-24 DIAGNOSIS — D72.829 ELEVATED WHITE BLOOD CELL COUNT, UNSPECIFIED: ICD-10-CM

## 2021-08-24 LAB
ANION GAP SERPL CALC-SCNC: 12 MMOL/L — SIGNIFICANT CHANGE UP (ref 5–17)
APPEARANCE UR: CLEAR — SIGNIFICANT CHANGE UP
APTT BLD: 102.9 SEC — HIGH (ref 27.5–35.5)
BILIRUB UR-MCNC: NEGATIVE — SIGNIFICANT CHANGE UP
BUN SERPL-MCNC: 55 MG/DL — HIGH (ref 7–23)
CALCIUM SERPL-MCNC: 10 MG/DL — SIGNIFICANT CHANGE UP (ref 8.4–10.5)
CHLORIDE SERPL-SCNC: 95 MMOL/L — LOW (ref 96–108)
CO2 SERPL-SCNC: 32 MMOL/L — HIGH (ref 22–31)
COLOR SPEC: YELLOW — SIGNIFICANT CHANGE UP
CREAT SERPL-MCNC: 1.23 MG/DL — SIGNIFICANT CHANGE UP (ref 0.5–1.3)
DIFF PNL FLD: NEGATIVE — SIGNIFICANT CHANGE UP
GLUCOSE SERPL-MCNC: 133 MG/DL — HIGH (ref 70–99)
GLUCOSE UR QL: NEGATIVE — SIGNIFICANT CHANGE UP
HCT VFR BLD CALC: 38.7 % — SIGNIFICANT CHANGE UP (ref 34.5–45)
HCT VFR BLD CALC: 39.4 % — SIGNIFICANT CHANGE UP (ref 34.5–45)
HGB BLD-MCNC: 12.1 G/DL — SIGNIFICANT CHANGE UP (ref 11.5–15.5)
HGB BLD-MCNC: 12.4 G/DL — SIGNIFICANT CHANGE UP (ref 11.5–15.5)
KETONES UR-MCNC: NEGATIVE — SIGNIFICANT CHANGE UP
LEUKOCYTE ESTERASE UR-ACNC: NEGATIVE — SIGNIFICANT CHANGE UP
MAGNESIUM SERPL-MCNC: 2.6 MG/DL — SIGNIFICANT CHANGE UP (ref 1.6–2.6)
MCHC RBC-ENTMCNC: 27.9 PG — SIGNIFICANT CHANGE UP (ref 27–34)
MCHC RBC-ENTMCNC: 28.4 PG — SIGNIFICANT CHANGE UP (ref 27–34)
MCHC RBC-ENTMCNC: 31.3 GM/DL — LOW (ref 32–36)
MCHC RBC-ENTMCNC: 31.5 GM/DL — LOW (ref 32–36)
MCV RBC AUTO: 89.2 FL — SIGNIFICANT CHANGE UP (ref 80–100)
MCV RBC AUTO: 90.2 FL — SIGNIFICANT CHANGE UP (ref 80–100)
NITRITE UR-MCNC: NEGATIVE — SIGNIFICANT CHANGE UP
NRBC # BLD: 0 /100 WBCS — SIGNIFICANT CHANGE UP (ref 0–0)
NRBC # BLD: 0 /100 WBCS — SIGNIFICANT CHANGE UP (ref 0–0)
PH UR: 6 — SIGNIFICANT CHANGE UP (ref 5–8)
PLATELET # BLD AUTO: 316 K/UL — SIGNIFICANT CHANGE UP (ref 150–400)
PLATELET # BLD AUTO: 334 K/UL — SIGNIFICANT CHANGE UP (ref 150–400)
POTASSIUM SERPL-MCNC: 3.6 MMOL/L — SIGNIFICANT CHANGE UP (ref 3.5–5.3)
POTASSIUM SERPL-SCNC: 3.6 MMOL/L — SIGNIFICANT CHANGE UP (ref 3.5–5.3)
PROT UR-MCNC: NEGATIVE MG/DL — SIGNIFICANT CHANGE UP
RBC # BLD: 4.34 M/UL — SIGNIFICANT CHANGE UP (ref 3.8–5.2)
RBC # BLD: 4.37 M/UL — SIGNIFICANT CHANGE UP (ref 3.8–5.2)
RBC # FLD: 15.7 % — HIGH (ref 10.3–14.5)
RBC # FLD: 15.9 % — HIGH (ref 10.3–14.5)
SODIUM SERPL-SCNC: 139 MMOL/L — SIGNIFICANT CHANGE UP (ref 135–145)
SP GR SPEC: 1.01 — SIGNIFICANT CHANGE UP (ref 1–1.03)
TROPONIN T SERPL-MCNC: 0.03 NG/ML — HIGH (ref 0–0.01)
TROPONIN T SERPL-MCNC: 0.03 NG/ML — HIGH (ref 0–0.01)
UROBILINOGEN FLD QL: 0.2 E.U./DL — SIGNIFICANT CHANGE UP
WBC # BLD: 8.84 K/UL — SIGNIFICANT CHANGE UP (ref 3.8–10.5)
WBC # BLD: 9.36 K/UL — SIGNIFICANT CHANGE UP (ref 3.8–10.5)
WBC # FLD AUTO: 8.84 K/UL — SIGNIFICANT CHANGE UP (ref 3.8–10.5)
WBC # FLD AUTO: 9.36 K/UL — SIGNIFICANT CHANGE UP (ref 3.8–10.5)

## 2021-08-24 PROCEDURE — 93454 CORONARY ARTERY ANGIO S&I: CPT | Mod: 26

## 2021-08-24 PROCEDURE — 99222 1ST HOSP IP/OBS MODERATE 55: CPT

## 2021-08-24 PROCEDURE — 99152 MOD SED SAME PHYS/QHP 5/>YRS: CPT

## 2021-08-24 PROCEDURE — 93306 TTE W/DOPPLER COMPLETE: CPT | Mod: 26

## 2021-08-24 PROCEDURE — 93571 IV DOP VEL&/PRESS C FLO 1ST: CPT | Mod: 26,52,RC

## 2021-08-24 PROCEDURE — 93010 ELECTROCARDIOGRAM REPORT: CPT

## 2021-08-24 RX ORDER — ATORVASTATIN CALCIUM 80 MG/1
1 TABLET, FILM COATED ORAL
Qty: 0 | Refills: 0 | DISCHARGE

## 2021-08-24 RX ORDER — POTASSIUM CHLORIDE 20 MEQ
40 PACKET (EA) ORAL ONCE
Refills: 0 | Status: COMPLETED | OUTPATIENT
Start: 2021-08-24 | End: 2021-08-24

## 2021-08-24 RX ORDER — METOPROLOL TARTRATE 50 MG
1 TABLET ORAL
Qty: 0 | Refills: 0 | DISCHARGE

## 2021-08-24 RX ORDER — HEPARIN SODIUM 5000 [USP'U]/ML
9500 INJECTION INTRAVENOUS; SUBCUTANEOUS EVERY 6 HOURS
Refills: 0 | Status: DISCONTINUED | OUTPATIENT
Start: 2021-08-24 | End: 2021-08-25

## 2021-08-24 RX ORDER — ATORVASTATIN CALCIUM 80 MG/1
40 TABLET, FILM COATED ORAL AT BEDTIME
Refills: 0 | Status: DISCONTINUED | OUTPATIENT
Start: 2021-08-24 | End: 2021-08-27

## 2021-08-24 RX ORDER — CLOPIDOGREL BISULFATE 75 MG/1
1 TABLET, FILM COATED ORAL
Qty: 0 | Refills: 0 | DISCHARGE

## 2021-08-24 RX ORDER — CLOPIDOGREL BISULFATE 75 MG/1
75 TABLET, FILM COATED ORAL DAILY
Refills: 0 | Status: DISCONTINUED | OUTPATIENT
Start: 2021-08-24 | End: 2021-08-27

## 2021-08-24 RX ORDER — SPIRONOLACTONE 25 MG/1
1 TABLET, FILM COATED ORAL
Qty: 0 | Refills: 0 | DISCHARGE

## 2021-08-24 RX ORDER — ASPIRIN/CALCIUM CARB/MAGNESIUM 324 MG
81 TABLET ORAL DAILY
Refills: 0 | Status: DISCONTINUED | OUTPATIENT
Start: 2021-08-25 | End: 2021-08-25

## 2021-08-24 RX ORDER — VENLAFAXINE HCL 75 MG
300 CAPSULE, EXT RELEASE 24 HR ORAL DAILY
Refills: 0 | Status: DISCONTINUED | OUTPATIENT
Start: 2021-08-24 | End: 2021-08-27

## 2021-08-24 RX ORDER — CHLORHEXIDINE GLUCONATE 213 G/1000ML
1 SOLUTION TOPICAL ONCE
Refills: 0 | Status: DISCONTINUED | OUTPATIENT
Start: 2021-08-24 | End: 2021-08-27

## 2021-08-24 RX ORDER — METOPROLOL TARTRATE 50 MG
50 TABLET ORAL DAILY
Refills: 0 | Status: DISCONTINUED | OUTPATIENT
Start: 2021-08-24 | End: 2021-08-27

## 2021-08-24 RX ORDER — MORPHINE SULFATE 50 MG/1
2 CAPSULE, EXTENDED RELEASE ORAL ONCE
Refills: 0 | Status: COMPLETED | OUTPATIENT
Start: 2021-08-24 | End: 2021-08-24

## 2021-08-24 RX ORDER — ASPIRIN/CALCIUM CARB/MAGNESIUM 324 MG
325 TABLET ORAL ONCE
Refills: 0 | Status: COMPLETED | OUTPATIENT
Start: 2021-08-24 | End: 2021-08-24

## 2021-08-24 RX ORDER — FONDAPARINUX SODIUM 2.5 MG/.5ML
1 INJECTION, SOLUTION SUBCUTANEOUS
Qty: 0 | Refills: 0 | DISCHARGE

## 2021-08-24 RX ORDER — LOSARTAN POTASSIUM 100 MG/1
1 TABLET, FILM COATED ORAL
Qty: 0 | Refills: 0 | DISCHARGE

## 2021-08-24 RX ORDER — ACETAMINOPHEN 500 MG
650 TABLET ORAL EVERY 6 HOURS
Refills: 0 | Status: DISCONTINUED | OUTPATIENT
Start: 2021-08-24 | End: 2021-08-27

## 2021-08-24 RX ORDER — METHOCARBAMOL 500 MG/1
1000 TABLET, FILM COATED ORAL ONCE
Refills: 0 | Status: COMPLETED | OUTPATIENT
Start: 2021-08-24 | End: 2021-08-24

## 2021-08-24 RX ORDER — SODIUM CHLORIDE 9 MG/ML
500 INJECTION INTRAMUSCULAR; INTRAVENOUS; SUBCUTANEOUS
Refills: 0 | Status: DISCONTINUED | OUTPATIENT
Start: 2021-08-24 | End: 2021-08-27

## 2021-08-24 RX ORDER — ACETAMINOPHEN 500 MG
1000 TABLET ORAL ONCE
Refills: 0 | Status: COMPLETED | OUTPATIENT
Start: 2021-08-24 | End: 2021-08-24

## 2021-08-24 RX ORDER — OXYCODONE AND ACETAMINOPHEN 5; 325 MG/1; MG/1
1 TABLET ORAL ONCE
Refills: 0 | Status: DISCONTINUED | OUTPATIENT
Start: 2021-08-24 | End: 2021-08-24

## 2021-08-24 RX ORDER — HEPARIN SODIUM 5000 [USP'U]/ML
INJECTION INTRAVENOUS; SUBCUTANEOUS
Qty: 25000 | Refills: 0 | Status: DISCONTINUED | OUTPATIENT
Start: 2021-08-24 | End: 2021-08-25

## 2021-08-24 RX ORDER — HEPARIN SODIUM 5000 [USP'U]/ML
4500 INJECTION INTRAVENOUS; SUBCUTANEOUS EVERY 6 HOURS
Refills: 0 | Status: DISCONTINUED | OUTPATIENT
Start: 2021-08-24 | End: 2021-08-25

## 2021-08-24 RX ORDER — LOSARTAN POTASSIUM 100 MG/1
50 TABLET, FILM COATED ORAL DAILY
Refills: 0 | Status: DISCONTINUED | OUTPATIENT
Start: 2021-08-24 | End: 2021-08-24

## 2021-08-24 RX ORDER — RIVAROXABAN 15 MG-20MG
0 KIT ORAL
Qty: 0 | Refills: 0 | DISCHARGE

## 2021-08-24 RX ADMIN — Medication 400 MILLIGRAM(S): at 11:14

## 2021-08-24 RX ADMIN — Medication 20 MILLIGRAM(S): at 22:02

## 2021-08-24 RX ADMIN — Medication 1000 MILLIGRAM(S): at 11:15

## 2021-08-24 RX ADMIN — CLOPIDOGREL BISULFATE 75 MILLIGRAM(S): 75 TABLET, FILM COATED ORAL at 08:41

## 2021-08-24 RX ADMIN — SODIUM CHLORIDE 75 MILLILITER(S): 9 INJECTION INTRAMUSCULAR; INTRAVENOUS; SUBCUTANEOUS at 20:00

## 2021-08-24 RX ADMIN — ATORVASTATIN CALCIUM 40 MILLIGRAM(S): 80 TABLET, FILM COATED ORAL at 21:50

## 2021-08-24 RX ADMIN — METHOCARBAMOL 1000 MILLIGRAM(S): 500 TABLET, FILM COATED ORAL at 01:31

## 2021-08-24 RX ADMIN — HEPARIN SODIUM 2100 UNIT(S)/HR: 5000 INJECTION INTRAVENOUS; SUBCUTANEOUS at 08:38

## 2021-08-24 RX ADMIN — Medication 325 MILLIGRAM(S): at 14:59

## 2021-08-24 RX ADMIN — Medication 50 MILLIGRAM(S): at 06:45

## 2021-08-24 RX ADMIN — Medication 40 MILLIEQUIVALENT(S): at 08:40

## 2021-08-24 RX ADMIN — LOSARTAN POTASSIUM 50 MILLIGRAM(S): 100 TABLET, FILM COATED ORAL at 06:45

## 2021-08-24 RX ADMIN — HEPARIN SODIUM 1800 UNIT(S)/HR: 5000 INJECTION INTRAVENOUS; SUBCUTANEOUS at 15:26

## 2021-08-24 RX ADMIN — Medication 300 MILLIGRAM(S): at 14:59

## 2021-08-24 NOTE — DIETITIAN INITIAL EVALUATION ADULT. - OTHER CALCULATIONS
Defer fluids to team secondary to CHF. Based on Standards of Care pt >% IBW thus ideal body weight used for all calculations. Needs adjusted for advanced age.

## 2021-08-24 NOTE — DIETITIAN NUTRITION RISK NOTIFICATION - ADDITIONAL COMMENTS/DIETITIAN RECOMMENDATIONS
1. As medically feasible, advance to DASH/TLC Diet   2. Encourage pt to meet nutritional needs as able   3. Consider PT/social work/psych consult   4. BMI notice placed

## 2021-08-24 NOTE — H&P ADULT - PROBLEM SELECTOR PLAN 1
, relieved with Diltiazem x 2  Now NSR @ 83bpm on arrival to [facility]  Holding Xarelto for now, will start heparin gtt

## 2021-08-24 NOTE — H&P ADULT - ATTENDING COMMENTS
Initial attending contact date 8/24/21 on morning bedside rounds. See attending addendum to HPI here for initial attending contact documentation.   71 morbidly obese F with HTN, HLD, known CAD with PCI LAD, Chronic diastolic CHFpEF 65% transferred from [facility] for further management of rapid afib. Patient auto converted to NSR and has e/o paroxysmal afib on telemetry monitoring. This morning, patient c/o chest pressure and pain radiating to left arm which patient states is similar to when she had prior MI.  Plan for LHC with Dr Black to examine residual known 60% RCA lesion which was recommended for potential PCI if clinically significant  Plan for:  NPO for LHC and possible PCI RCA with Dr Black  Hep gtt for Afib CVA risk reduction  -->resume home Xarelto post cath  DAPT with ASA/ Clopidogrel   High intensity statin Atorva 40mg qHS  Metoprolol 50XL daily, Spironolactone 25 daily  Torsemide 10 daily + metolazone 5 daily (per outpt regimen with Dr Ireland) to maintain eugvolemia  Losartan 50 daily for BP control, hold on date of cath  PT consult pending  Details discussed with the patient and cardiac care team  Adan Parsons M.D.  Cardiology Attending Initial attending contact date 8/24/21 on morning bedside rounds. See attending addendum to HPI here for initial attending contact documentation.   71 morbidly obese F with HTN, HLD, known CAD with PCI LAD, Chronic diastolic CHFpEF 65% transferred from [facility] for further management of rapid afib. Patient auto converted to NSR and has e/o paroxysmal afib on telemetry monitoring. This morning, patient c/o chest pressure and pain radiating to left arm which patient states is similar to when she had prior MI.  Plan for LHC with Dr Black to examine residual known 60% RCA lesion which was recommended for potential PCI if clinically significant  Plan for:  NPO for LHC and possible PCI RCA with Dr Black  Hep gtt for Afib CVA risk reduction  -->resume home Xarelto post cath  DAPT with ASA/ Clopidogrel   High intensity statin Atorva 40mg qHS  Metoprolol 50XL daily, Spironolactone 25 daily  WIll need to clarify home diuretic regimen  Losartan 50 daily for BP control, hold on date of cath  PT consult pending  Details discussed with the patient and cardiac care team  Adan Parsons M.D.  Cardiology Attending

## 2021-08-24 NOTE — DIETITIAN INITIAL EVALUATION ADULT. - PERTINENT LABORATORY DATA
Sodium, Serum: 139 mmol/L  Potassium, Serum: 3.6 mmol/L  Chloride, Serum: 95 mmol/L (Low)  BUN, Serum: 55 mg/dL (Elevated)  Creatinine, Serum: 1.23 mg/dL  Glucose, Serum: 133 mg/dL (Elevated)  Calcium, Serum: 10.0 mg/dL  Magnesium, Serum: 2.6 mg/dL

## 2021-08-24 NOTE — DIETITIAN NUTRITION RISK NOTIFICATION - TREATMENT: THE FOLLOWING DIET HAS BEEN RECOMMENDED
Diet, NPO:   NPO for Procedure/Test     NPO Start Date: 24-Aug-2021,   NPO Start Time: 04:00 (08-24-21 @ 03:59) [Active]

## 2021-08-24 NOTE — H&P ADULT - NSHPREVIEWOFSYSTEMS_GEN_ALL_CORE
GENERAL, CONSTITUTIONAL : denies recent weight loss, fever, chills  EYES, VISION: denies changes in vision   EARS, NOSE, THROAT: denies hearing loss  HEART, CARDIOVASCULAR:  chest pain, arrhythmia, palpitations, SOB,  LE edema. Denies claudication  RESPIRATORY: cough, SOB, wheezing, PND. Denies orthopnea  GASTROINTESTINAL: Denies abdominal pain, heartburn, bloody stool, dark tarry stool  GENITOURINARY: Denies frequent urination, urgency  MUSCULOSKELETAL denies joint pain or swelling, restricted motion, musculoskeletal pain.   SKIN & INTEGUMENTARY Denies rashes, sores, blisters, blisters, growths.  NEUROLOGICAL: Denies numbness or tingling sensations, sensation loss, burning.   PSYCHIATRIC: Denies nervousness, anxiety, depression  ENDOCRINE Denies heat or cold intolerance, excessive thirst  HEMATOLOGIC/LYMPHATIC: Denies abnormal bleeding, bleeding of any kind

## 2021-08-24 NOTE — H&P ADULT - ASSESSMENT
71 y.o female, obese with HTN, HYP, known CAD s/p LAD stent in March 2021, presented with AFib RVR, chest pain. R/O MI with 1st troponin

## 2021-08-24 NOTE — H&P ADULT - NSHPLABSRESULTS_GEN_ALL_CORE
13.5   11.95 )-----------( 349      ( 23 Aug 2021 20:26 )             41.1       08-23    139  |  96  |  52<H>  ----------------------------<  131<H>  3.0<L>   |  35<H>  |  1.53<H>    Ca    9.9      23 Aug 2021 20:26    TPro  8.2  /  Alb  3.6  /  TBili  0.3  /  DBili  x   /  AST  37  /  ALT  67<H>  /  AlkPhos  161<H>  08-23      PT/INR - ( 23 Aug 2021 20:27 )   PT: 17.6 sec;   INR: 1.52          PTT - ( 23 Aug 2021 20:27 )  PTT:30.6 sec    CARDIAC MARKERS ( 23 Aug 2021 20:26 )  <0.017 ng/mL / x     / x     / x     / x

## 2021-08-24 NOTE — H&P ADULT - TIME BILLING
Initial attending contact date  8/24/21 on morning bedside rounds. See attending addendum to HPI here for initial attending contact documentation.   71 morbidly obese F with HTN, HLD, known CAD with PCI LAD, Chronic diastolic CHFpEF 65% transferred from [facility] for further management of rapid afib. Patient auto converted to NSR and has e/o paroxysmal afib on telemetry monitoring. This morning, patient c/o chest pressure and pain radiating to left arm which patient states is similar to when she had prior MI.  Plan for LHC with Dr Black to examine residual known 60% RCA lesion which was recommended for potential PCI if clinically significant  Plan for:  NPO for LHC and possible PCI RCA with Dr Black  Hep gtt for Afib CVA risk reduction  -->resume home Xarelto post cath  DAPT with ASA/ Clopidogrel   High intensity statin Atorva 40mg qHS  Metoprolol 50XL daily, Spironolactone 25 daily  Torsemide 10 daily + metolazone 5 daily (per outpt regimen with Dr Ireland) to maintain eugvolemia  Losartan 50 daily for BP control, hold on date of cath  PT consult pending  Details discussed with the patient and cardiac care team  Adan Parsons M.D.  Cardiology Attending Initial attending contact date  8/24/21 on morning bedside rounds. See attending addendum to HPI here for initial attending contact documentation.   71 morbidly obese F with HTN, HLD, known CAD with PCI LAD, Chronic diastolic CHFpEF 65% transferred from [facility] for further management of rapid afib. Patient auto converted to NSR and has e/o paroxysmal afib on telemetry monitoring. This morning, patient c/o chest pressure and pain radiating to left arm which patient states is similar to when she had prior MI.  Plan for LHC with Dr Black to examine residual known 60% RCA lesion which was recommended for potential PCI if clinically significant  Plan for:  NPO for LHC and possible PCI RCA with Dr Black  Hep gtt for Afib CVA risk reduction  -->resume home Xarelto post cath  DAPT with ASA/ Clopidogrel   High intensity statin Atorva 40mg qHS  Metoprolol 50XL daily, Spironolactone 25 daily  Losartan 50 daily for BP control, hold on date of cath  PT consult pending  Details discussed with the patient and cardiac care team  Adan Pasrons M.D.  Cardiology Attending

## 2021-08-24 NOTE — H&P ADULT - HISTORY OF PRESENT ILLNESS
This is a 70 y/o morbidly obese female with HTN, Hypercholesterolemia, known CAD with PCI of the LAD 3/22/21, diastolic CHF with EF 65% on 3/15/21, BIBA to [facility] with palpitations, SOB and SOB that started at rest. Pt was in bed when she started experiencing fast HR, pressure to both arms and chest which worsened to crushing chest pain that lasted over 2hrs and relieved with morphine in the ER.  Pt states that she has been feeling SOB, coughing yellow colored sputum for a few weeks and completed 3 rounds of antibiotics for a total of  24days without complete resolution  In the ER, /77, , R 20, T 100.1, O2 96% 3L; Labs significant for WBC 11.95, Creat 1.53 (Baseline creat 1.0), , trop <0.017, K 3.0. EKG with AFib with RVR @ 154bpm. She received Cardizem 30mg then 60mg with HR down to 103, Morphine, 1L of LR and she is transferred to [facility] for further management

## 2021-08-24 NOTE — DIETITIAN INITIAL EVALUATION ADULT. - PROBLEM SELECTOR PLAN 1
, relieved with Diltiazem x 2  Now NSR @ 83bpm on arrival to [facility]  Holding Xarelto for now, will start heparin gtt yes

## 2021-08-24 NOTE — H&P ADULT - PROBLEM SELECTOR PROBLEM 2
Is This A New Presentation, Or A Follow-Up?: Skin Lesion How Severe Is Your Skin Lesion?: moderate Has Your Skin Lesion Been Treated?: not been treated CAD (coronary artery disease)

## 2021-08-24 NOTE — H&P ADULT - PROBLEM SELECTOR PLAN 2
Known CAD with prior PCI of LAD, was supposed to return for PCI of residual RCA 20-60% in July but did not because of upper respiratory infection  NPO for possible cardiac work up

## 2021-08-24 NOTE — H&P ADULT - NSHPPHYSICALEXAM_GEN_ALL_CORE
T(C): 37.4 (08-24-21 @ 02:11), Max: 37.8 (08-23-21 @ 20:30)  HR: 82 (08-24-21 @ 03:02) (82 - 144)  BP: 116/63 (08-24-21 @ 03:02) (109/75 - 152/72)  RR: 18 (08-24-21 @ 03:02) (16 - 18)  SpO2: 91% (08-24-21 @ 03:02) (91% - 99%)  Wt(kg): --    Appearance: Normal	  HEENT:   Normal oral mucosa, PERRL, EOMI	  Neck: Supple, - JVD; No Carotid Bruit and 2+ pulses B/L  Cardiovascular: Normal S1 S2, No JVD, No murmurs  Respiratory: Lungs clear to auscultation, No Rales, Rhonchi, Wheezing	  Gastrointestinal:  obese, but soft, Non-tender, + BS	  Skin: dry, no rashes, No ecchymoses, No cyanosis  Extremities: Normal range of motion, No clubbing, cyanosis + trace edema b/l  Vascular: Femoral pulses 2+ b/l without bruit, DP 1+ b/l, PT 1+ b/l  Neurologic: Non-focal  Psychiatry: A & O x 3, Mood & affect appropriate

## 2021-08-24 NOTE — H&P ADULT - NSICDXPASTMEDICALHX_GEN_ALL_CORE_FT
PAST MEDICAL HISTORY:  Colon polyp     Coronary artery disease     Fibromyalgia     Hepatitis C     Herniated disc, cervical     HLD (hyperlipidemia)     HTN (hypertension)     Myocardial infarction, unspecified MI type, unspecified artery

## 2021-08-24 NOTE — H&P ADULT - NSHPSOCIALHISTORY_GEN_ALL_CORE
Lives with a roommate  1ppd smoker for 44 years  Drinks 2-3 glasses of red wine daily for ~ 20 years

## 2021-08-25 ENCOUNTER — TRANSCRIPTION ENCOUNTER (OUTPATIENT)
Age: 71
End: 2021-08-25

## 2021-08-25 LAB
ANION GAP SERPL CALC-SCNC: 14 MMOL/L — SIGNIFICANT CHANGE UP (ref 5–17)
BASOPHILS # BLD AUTO: 0.07 K/UL — SIGNIFICANT CHANGE UP (ref 0–0.2)
BASOPHILS NFR BLD AUTO: 0.8 % — SIGNIFICANT CHANGE UP (ref 0–2)
BUN SERPL-MCNC: 48 MG/DL — HIGH (ref 7–23)
CALCIUM SERPL-MCNC: 9.8 MG/DL — SIGNIFICANT CHANGE UP (ref 8.4–10.5)
CHLORIDE SERPL-SCNC: 92 MMOL/L — LOW (ref 96–108)
CO2 SERPL-SCNC: 31 MMOL/L — SIGNIFICANT CHANGE UP (ref 22–31)
CREAT SERPL-MCNC: 1.12 MG/DL — SIGNIFICANT CHANGE UP (ref 0.5–1.3)
EOSINOPHIL # BLD AUTO: 0.27 K/UL — SIGNIFICANT CHANGE UP (ref 0–0.5)
EOSINOPHIL NFR BLD AUTO: 3 % — SIGNIFICANT CHANGE UP (ref 0–6)
GLUCOSE SERPL-MCNC: 116 MG/DL — HIGH (ref 70–99)
HCT VFR BLD CALC: 40.3 % — SIGNIFICANT CHANGE UP (ref 34.5–45)
HCV AB S/CO SERPL IA: 115.8 S/CO — HIGH
HCV AB SERPL-IMP: REACTIVE
HGB BLD-MCNC: 12.8 G/DL — SIGNIFICANT CHANGE UP (ref 11.5–15.5)
IMM GRANULOCYTES NFR BLD AUTO: 0.7 % — SIGNIFICANT CHANGE UP (ref 0–1.5)
LYMPHOCYTES # BLD AUTO: 1.39 K/UL — SIGNIFICANT CHANGE UP (ref 1–3.3)
LYMPHOCYTES # BLD AUTO: 15.6 % — SIGNIFICANT CHANGE UP (ref 13–44)
MAGNESIUM SERPL-MCNC: 2.3 MG/DL — SIGNIFICANT CHANGE UP (ref 1.6–2.6)
MCHC RBC-ENTMCNC: 28.6 PG — SIGNIFICANT CHANGE UP (ref 27–34)
MCHC RBC-ENTMCNC: 31.8 GM/DL — LOW (ref 32–36)
MCV RBC AUTO: 90.2 FL — SIGNIFICANT CHANGE UP (ref 80–100)
MONOCYTES # BLD AUTO: 0.87 K/UL — SIGNIFICANT CHANGE UP (ref 0–0.9)
MONOCYTES NFR BLD AUTO: 9.8 % — SIGNIFICANT CHANGE UP (ref 2–14)
NEUTROPHILS # BLD AUTO: 6.23 K/UL — SIGNIFICANT CHANGE UP (ref 1.8–7.4)
NEUTROPHILS NFR BLD AUTO: 70.1 % — SIGNIFICANT CHANGE UP (ref 43–77)
NRBC # BLD: 0 /100 WBCS — SIGNIFICANT CHANGE UP (ref 0–0)
PLATELET # BLD AUTO: 330 K/UL — SIGNIFICANT CHANGE UP (ref 150–400)
POTASSIUM SERPL-MCNC: 3 MMOL/L — LOW (ref 3.5–5.3)
POTASSIUM SERPL-SCNC: 3 MMOL/L — LOW (ref 3.5–5.3)
RBC # BLD: 4.47 M/UL — SIGNIFICANT CHANGE UP (ref 3.8–5.2)
RBC # FLD: 15.8 % — HIGH (ref 10.3–14.5)
SODIUM SERPL-SCNC: 137 MMOL/L — SIGNIFICANT CHANGE UP (ref 135–145)
WBC # BLD: 8.89 K/UL — SIGNIFICANT CHANGE UP (ref 3.8–10.5)
WBC # FLD AUTO: 8.89 K/UL — SIGNIFICANT CHANGE UP (ref 3.8–10.5)

## 2021-08-25 PROCEDURE — 99233 SBSQ HOSP IP/OBS HIGH 50: CPT

## 2021-08-25 PROCEDURE — 99221 1ST HOSP IP/OBS SF/LOW 40: CPT | Mod: GC

## 2021-08-25 RX ORDER — FLUTICASONE PROPIONATE 50 MCG
1 SPRAY, SUSPENSION NASAL
Refills: 0 | Status: DISCONTINUED | OUTPATIENT
Start: 2021-08-25 | End: 2021-08-27

## 2021-08-25 RX ORDER — BENZOCAINE AND MENTHOL 5; 1 G/100ML; G/100ML
1 LIQUID ORAL ONCE
Refills: 0 | Status: COMPLETED | OUTPATIENT
Start: 2021-08-25 | End: 2021-08-25

## 2021-08-25 RX ORDER — LOSARTAN POTASSIUM 100 MG/1
50 TABLET, FILM COATED ORAL DAILY
Refills: 0 | Status: DISCONTINUED | OUTPATIENT
Start: 2021-08-25 | End: 2021-08-26

## 2021-08-25 RX ORDER — POTASSIUM CHLORIDE 20 MEQ
40 PACKET (EA) ORAL EVERY 4 HOURS
Refills: 0 | Status: COMPLETED | OUTPATIENT
Start: 2021-08-25 | End: 2021-08-25

## 2021-08-25 RX ORDER — MORPHINE SULFATE 50 MG/1
2 CAPSULE, EXTENDED RELEASE ORAL ONCE
Refills: 0 | Status: DISCONTINUED | OUTPATIENT
Start: 2021-08-25 | End: 2021-08-25

## 2021-08-25 RX ORDER — ALPRAZOLAM 0.25 MG
0.25 TABLET ORAL ONCE
Refills: 0 | Status: DISCONTINUED | OUTPATIENT
Start: 2021-08-25 | End: 2021-08-25

## 2021-08-25 RX ORDER — NICOTINE POLACRILEX 2 MG
4 GUM BUCCAL
Refills: 0 | Status: DISCONTINUED | OUTPATIENT
Start: 2021-08-25 | End: 2021-08-27

## 2021-08-25 RX ORDER — RIVAROXABAN 15 MG-20MG
20 KIT ORAL DAILY
Refills: 0 | Status: DISCONTINUED | OUTPATIENT
Start: 2021-08-25 | End: 2021-08-27

## 2021-08-25 RX ORDER — SPIRONOLACTONE 25 MG/1
25 TABLET, FILM COATED ORAL DAILY
Refills: 0 | Status: DISCONTINUED | OUTPATIENT
Start: 2021-08-25 | End: 2021-08-27

## 2021-08-25 RX ORDER — BENZOCAINE AND MENTHOL 5; 1 G/100ML; G/100ML
1 LIQUID ORAL ONCE
Refills: 0 | Status: DISCONTINUED | OUTPATIENT
Start: 2021-08-25 | End: 2021-08-25

## 2021-08-25 RX ORDER — POTASSIUM CHLORIDE 20 MEQ
20 PACKET (EA) ORAL ONCE
Refills: 0 | Status: COMPLETED | OUTPATIENT
Start: 2021-08-25 | End: 2021-08-25

## 2021-08-25 RX ORDER — PANTOPRAZOLE SODIUM 20 MG/1
40 TABLET, DELAYED RELEASE ORAL
Refills: 0 | Status: DISCONTINUED | OUTPATIENT
Start: 2021-08-26 | End: 2021-08-27

## 2021-08-25 RX ADMIN — RIVAROXABAN 20 MILLIGRAM(S): KIT at 09:43

## 2021-08-25 RX ADMIN — Medication 4 MILLIGRAM(S): at 17:45

## 2021-08-25 RX ADMIN — Medication 0.25 MILLIGRAM(S): at 23:10

## 2021-08-25 RX ADMIN — Medication 650 MILLIGRAM(S): at 17:51

## 2021-08-25 RX ADMIN — Medication 20 MILLIGRAM(S): at 17:43

## 2021-08-25 RX ADMIN — Medication 20 MILLIGRAM(S): at 05:33

## 2021-08-25 RX ADMIN — Medication 50 MILLIGRAM(S): at 06:22

## 2021-08-25 RX ADMIN — Medication 40 MILLIEQUIVALENT(S): at 17:43

## 2021-08-25 RX ADMIN — CLOPIDOGREL BISULFATE 75 MILLIGRAM(S): 75 TABLET, FILM COATED ORAL at 13:38

## 2021-08-25 RX ADMIN — Medication 40 MILLIEQUIVALENT(S): at 13:38

## 2021-08-25 RX ADMIN — SPIRONOLACTONE 25 MILLIGRAM(S): 25 TABLET, FILM COATED ORAL at 17:44

## 2021-08-25 RX ADMIN — Medication 4 MILLIGRAM(S): at 23:11

## 2021-08-25 RX ADMIN — BENZOCAINE AND MENTHOL 1 LOZENGE: 5; 1 LIQUID ORAL at 23:10

## 2021-08-25 RX ADMIN — Medication 20 MILLIEQUIVALENT(S): at 09:43

## 2021-08-25 RX ADMIN — MORPHINE SULFATE 2 MILLIGRAM(S): 50 CAPSULE, EXTENDED RELEASE ORAL at 00:38

## 2021-08-25 RX ADMIN — ATORVASTATIN CALCIUM 40 MILLIGRAM(S): 80 TABLET, FILM COATED ORAL at 23:10

## 2021-08-25 RX ADMIN — MORPHINE SULFATE 2 MILLIGRAM(S): 50 CAPSULE, EXTENDED RELEASE ORAL at 00:42

## 2021-08-25 RX ADMIN — Medication 300 MILLIGRAM(S): at 13:38

## 2021-08-25 NOTE — DISCHARGE NOTE PROVIDER - HOSPITAL COURSE
This is a 70 y/o morbidly obese female with HTN, Hypercholesterolemia, known CAD with PCI of the LAD 3/22/21, diastolic CHF with EF 65% on 3/15/21, BIBA to [facility] with palpitations, SOB and SOB that started at rest. Pt was in bed when she started experiencing fast HR, pressure to both arms and chest which worsened to crushing chest pain that lasted over 2hrs and relieved with morphine in the ER.  Pt states that she has been feeling SOB, coughing yellow colored sputum for a few weeks and completed 3 rounds of antibiotics for a total of  24days without complete resolution. In the ER, /77, , R 20, T 100.1, O2 96% 3L; Labs significant for WBC 11.95, Creat 1.53 (Baseline creat 1.0), , trop <0.017, K 3.0. EKG with AFib with RVR @ 154bpm. She received Cardizem 30mg then 60mg with HR down to 103, Morphine, 1L of LR and she is transferred to [facility] for further management.     Patient originally with atrial fibrillation with RVR to 158s, relieved with Diltiazem x 2. Patient self-converted to NSR upon arrival to [facility]. Patient continued on Toprol XL 50 mg once daily for rate control and Xarelto 20 mg once daily for A/C.    Patient with known CAD - was planned for staged PCI of residual RCA 20-60% in July but did not because of upper respiratory infection. Patient underwent LHC   on 08/25/2021 -  LM no significant disease, LAD stent widely patent, pLCx 30% calcified, mRCA 50-80% w/ IFR 0.97, R TR x2 at 10p  Echo 8/24/21: Normal biV size and systolic function, mild LVH, aortic sclerosis w/o significant stenosis, PASP 33mmHg, no pericardial effusion. Patient continued on Plavix 75 mg once daily, Xarelto 20 mg once daily, Toprol XL 25 mg once daily, and Atorva 40 mg once daily.    Pulm consulted as patient complaining of chronic cough and sputum production. Pulm consulted - CT demonstrates some emphysema and bronchiectasis, although mild. Pulm recommending further testing as an outpatient to establish the diagnosis with PFTs. Patient does not have any evidence of ILD on her CT scan.     No significant events on telemetry overnight. Repeat EKG without ischemic changes. Patient has been medically cleared for discharge as per Dr. Elizabeth. Patient has been given appropriate discharge instructions including medication regimen, access site management and follow up. Medications that patient needs refills on have been e-prescribed to preferred pharmacy.     D/C meds: Torsemide 40 mg once daily, Plavix 75 mg once daily, Xarelto 20 mg once daily, Metoprolol Succinate 50 mg once daily, Pantoprazole 40 mg once daily, Spironolactone 25 mg once daily, and ___     Cardiac Rehab Indications (CHF)::             *Education on benefits of Cardiac Rehab provided to patient: Yes           *Referral and Prescription Given for Cardiac Rehab: Yes/No.  If No, Why Not?  (see reasons below)           *Pt given list of locations & instructed to contact their insurance company to review list of participating providers. Yes           *Pt instructed to bring Cardiac Rehab prescription with them to Cardiology Follow up appointment for assistance with enrollment: Yes     This is a 72 y/o morbidly obese female with HTN, Hypercholesterolemia, known CAD with PCI of the LAD 3/22/21, diastolic CHF with EF 65% on 3/15/21, BIBA to [facility] with palpitations, SOB and SOB that started at rest. Pt was in bed when she started experiencing fast HR, pressure to both arms and chest which worsened to crushing chest pain that lasted over 2hrs and relieved with morphine in the ER.  Pt states that she has been feeling SOB, coughing yellow colored sputum for a few weeks and completed 3 rounds of antibiotics for a total of  24days without complete resolution. In the ER, /77, , R 20, T 100.1, O2 96% 3L; Labs significant for WBC 11.95, Creat 1.53 (Baseline creat 1.0), , trop <0.017, K 3.0. EKG with AFib with RVR @ 154bpm. She received Cardizem 30mg then 60mg with HR down to 103, Morphine, 1L of LR and she is transferred to [facility] for further management.     Patient originally with atrial fibrillation with RVR to 158s, relieved with Diltiazem x 2. Patient self-converted to NSR upon arrival to [facility]. Patient continued on Toprol XL 50 mg once daily for rate control and Xarelto 20 mg once daily for A/C.    Patient with known CAD - was planned for staged PCI of residual RCA 20-60% in July but did not because of upper respiratory infection. Patient underwent LHC   on 08/25/2021 -  LM no significant disease, LAD stent widely patent, pLCx 30% calcified, mRCA 50-80% w/ IFR 0.97, R TR x2 at 10p  Echo 8/24/21: Normal biV size and systolic function, mild LVH, aortic sclerosis w/o significant stenosis, PASP 33mmHg, no pericardial effusion. Patient continued on Plavix 75 mg once daily, Xarelto 20 mg once daily, Toprol XL 25 mg once daily, and Atorva 40 mg once daily.    Pulm consulted as patient complaining of chronic cough and sputum production. Pulm consulted - CT demonstrates some emphysema and bronchiectasis, although mild. Pulm recommending further testing as an outpatient to establish the diagnosis with PFTs. Patient does not have any evidence of ILD on her CT scan.     No significant events on telemetry overnight. Repeat EKG without ischemic changes. Patient has been medically cleared for discharge as per Dr. Elizabeth. Patient has been given appropriate discharge instructions including medication regimen, access site management and follow up. Medications that patient needs refills on have been e-prescribed to preferred pharmacy.     D/C meds: Torsemide 40 mg once daily, Plavix 75 mg once daily, Xarelto 20 mg once daily, Metoprolol Succinate 50 mg once daily, Pantoprazole 40 mg once daily, Spironolactone 25 mg once daily, and ___     Cardiac Rehab Indications (CHF)::             *Education on benefits of Cardiac Rehab provided to patient: Yes           *Referral and Prescription Given for Cardiac Rehab: Yes/No.  If No, Why Not?  (see reasons below)           *Pt given list of locations & instructed to contact their insurance company to review list of participating providers. Yes           *Pt instructed to bring Cardiac Rehab prescription with them to Cardiology Follow up appointment for assistance with enrollment: Yes    Patient educated on benefits of smoking cessation - trialled lozenges during hospital stay and would like to continue using the lozenges upon discharge.      This is a 70 y/o morbidly obese female with HTN, Hypercholesterolemia, known CAD with PCI of the LAD 3/22/21, diastolic CHF with EF 65% on 3/15/21, BIBA to [facility] with palpitations, SOB and SOB that started at rest. Pt was in bed when she started experiencing fast HR, pressure to both arms and chest which worsened to crushing chest pain that lasted over 2hrs and relieved with morphine in the ER.  Pt states that she has been feeling SOB, coughing yellow colored sputum for a few weeks and completed 3 rounds of antibiotics for a total of  24days without complete resolution. In the ER, /77, , R 20, T 100.1, O2 96% 3L; Labs significant for WBC 11.95, Creat 1.53 (Baseline creat 1.0), , trop <0.017, K 3.0. EKG with AFib with RVR @ 154bpm. She received Cardizem 30mg then 60mg with HR down to 103, Morphine, 1L of LR and she is transferred to [facility] for further management.     Patient originally with atrial fibrillation with RVR to 158s, relieved with Diltiazem x 2. Patient self-converted to NSR upon arrival to [facility]. Patient continued on Toprol XL 50 mg once daily for rate control and Xarelto 20 mg once daily for A/C.    Patient with known CAD - was planned for staged PCI of residual RCA 20-60% in July but did not because of upper respiratory infection. Patient underwent LHC   on 08/25/2021 -  LM no significant disease, LAD stent widely patent, pLCx 30% calcified, mRCA 50-80% w/ IFR 0.97, R TR x2 at 10p  Echo 8/24/21: Normal biV size and systolic function, mild LVH, aortic sclerosis w/o significant stenosis, PASP 33mmHg, no pericardial effusion. Patient continued on Plavix 75 mg once daily, Xarelto 20 mg once daily, Toprol XL 25 mg once daily, and Atorva 40 mg once daily.    Pulm consulted as patient complaining of chronic cough and sputum production. Pulm consulted - CT demonstrates some emphysema and bronchiectasis, although mild. Pulm recommending further testing as an outpatient to establish the diagnosis with PFTs. Patient does not have any evidence of ILD on her CT scan.     No significant events on telemetry overnight. Repeat EKG without ischemic changes. Patient has been medically cleared for discharge as per Dr. Elizabeth. Patient has been given appropriate discharge instructions including medication regimen, access site management and follow up. Medications that patient needs refills on have been e-prescribed to preferred pharmacy.     Patient educated on benefits of smoking cessation - trialled lozenges during hospital stay and would like to continue using the lozenges upon discharge.      This is a 72 y/o morbidly obese female with Chronic AFib on Xarelto, HTN, Hypercholesterolemia, known CAD with PCI of the LAD 3/22/21, diastolic CHF with EF 65% on 3/15/21, BIBA to [facility] with palpitations, SOB and SOB that started at rest. Pt was in bed when she started experiencing fast HR, pressure to both arms and chest which worsened to crushing chest pain that lasted over 2hrs and relieved with morphine in the ER.  Pt states that she has been feeling SOB, coughing yellow colored sputum for a few weeks and completed 3 rounds of antibiotics for a total of  24days without complete resolution. In the ER, /77, , R 20, T 100.1, O2 96% 3L; Labs significant for WBC 11.95, Creat 1.53 (Baseline creat 1.0), , trop <0.017, K 3.0. EKG with AFib with RVR @ 154bpm. She received Cardizem 30mg then 60mg with HR down to 103, Morphine, 1L of LR and she is transferred to [facility] for further management.     Patient originally with atrial fibrillation with RVR to 158s, relieved with Diltiazem x 2. Patient self-converted to NSR upon arrival to [facility]. Patient continued on Toprol XL 50 mg once daily for rate control and Xarelto 20 mg once daily for A/C.    Patient with known CAD - was planned for staged PCI of residual RCA 20-60% in July but did not because of upper respiratory infection. Patient underwent C   on 08/25/2021 -  LM no significant disease, LAD stent widely patent, pLCx 30% calcified, mRCA 50-80% w/ IFR 0.97, R TR x2 at 10p  Echo 8/24/21: Normal biV size and systolic function, mild LVH, aortic sclerosis w/o significant stenosis, PASP 33mmHg, no pericardial effusion. Patient continued on Plavix 75 mg once daily, Xarelto 20 mg once daily, Toprol XL 25 mg once daily, and Atorva 40 mg once daily.    Pulm consulted as patient complaining of chronic cough and sputum production. Pulm consulted - CT demonstrates some emphysema and bronchiectasis, although mild. Pulm recommending further testing as an outpatient to establish the diagnosis with PFTs. Patient does not have any evidence of ILD on her CT scan.     No significant events on telemetry overnight. Repeat EKG without ischemic changes. Patient has been medically cleared for discharge as per Dr. Elizabeth. Patient has been given appropriate discharge instructions including medication regimen, access site management and follow up. Medications that patient needs refills on have been e-prescribed to preferred pharmacy.     Patient educated on benefits of smoking cessation - trialled lozenges during hospital stay and would like to continue using the lozenges upon discharge.

## 2021-08-25 NOTE — CONSULT NOTE ADULT - ATTENDING COMMENTS
chronic cough likely multifactorial. can rx for upper airway cough syndrome and GERD. will need f/u in pulmonary clinic for PFTs and evaluation of underlying obstructive lung disease.  smoking cessation advised.

## 2021-08-25 NOTE — DISCHARGE NOTE PROVIDER - CARE PROVIDER_API CALL
Rodger Ji  [address] [place] [address] [address]  [place] [address]  Phone: [phone]  Fax: (   )    -  Follow Up Time: 1 week    [facility],   [address] [place] [address]  Phone: [phone]  Fax: (   )    -  Follow Up Time: 1 week

## 2021-08-25 NOTE — DISCHARGE NOTE PROVIDER - DETAILS OF MALNUTRITION DIAGNOSIS/DIAGNOSES
This patient has been assessed with a concern for Malnutrition and was treated during this hospitalization for the following Nutrition diagnosis/diagnoses:     -  08/24/2021: Morbid obesity (BMI > 40)

## 2021-08-25 NOTE — PHYSICAL THERAPY INITIAL EVALUATION ADULT - PERTINENT HX OF CURRENT PROBLEM, REHAB EVAL
71F BIBA to [facility] with palpitations, SOB and SOB that started at rest. Pt was in bed when she started experiencing fast HR, pressure to both arms and chest which worsened to crushing chest pain that lasted over 2hrs and relieved with morphine in the ER.  Pt states that she has been feeling SOB, coughing yellow colored sputum for a few weeks and completed 3 rounds of antibiotics for a total of  24days without complete resolution

## 2021-08-25 NOTE — PHYSICAL THERAPY INITIAL EVALUATION ADULT - ASSISTIVE DEVICE FOR STAIR TRANSFER, REHAB EVAL
patient complaints of fatigue and wanting to sit, severe SOB, desat to 88% on stairs/right rail up/right rail down

## 2021-08-25 NOTE — PHYSICAL THERAPY INITIAL EVALUATION ADULT - GENERAL OBSERVATIONS, REHAB EVAL
Received sitting in chair complaints of generalized body pain 10/10 +EKG, IV hep. left as found +RN lorena hood, call dave

## 2021-08-25 NOTE — DISCHARGE NOTE PROVIDER - PROVIDER TOKENS
FREE:[LAST:[Ji],FIRST:[Rodger],PHONE:[phone]],FAX:[(   )    -],ADDRESS:[[address] [place] [address] [address] [place] [address]],FOLLOWUP:[1 week]],FREE:[LAST:[[facility]],PHONE:[phone]:[(   )    -],ADDRESS:[[address] [place] [address]],FOLLOWUP:[1 week]]

## 2021-08-25 NOTE — DISCHARGE NOTE PROVIDER - NSDCMRMEDTOKEN_GEN_ALL_CORE_FT
Lipitor 40 mg oral tablet: 1 tab(s) orally once a day  losartan 50 mg oral tablet: 1 tab(s) orally once a day  metoprolol succinate 50 mg oral tablet, extended release: 1 tab(s) orally once a day  Plavix 75 mg oral tablet: 1 tab(s) orally once a day   Aldactone 25 mg oral tablet: 1 tab(s) orally once a day  fluticasone 50 mcg/inh nasal spray: 1 spray(s) nasal 2 times a day, As needed, nasal congestion  Lipitor 40 mg oral tablet: 1 tab(s) orally once a day  losartan 50 mg oral tablet: 1 tab(s) orally once a day  metoprolol succinate 50 mg oral tablet, extended release: 1 tab(s) orally once a day  Nicorette 4 mg oral transmucosal gum: 1 gum oral transmucosal every 2 hours  pantoprazole 40 mg oral delayed release tablet: 1 tab(s) orally once a day (before a meal)  Plavix 75 mg oral tablet: 1 tab(s) orally once a day  rivaroxaban 20 mg oral tablet: 1 tab(s) orally once a day  torsemide 20 mg oral tablet: 2 tab(s) orally once a day  venlafaxine 150 mg oral capsule, extended release: 2 cap(s) orally once a day   acetaminophen 325 mg oral tablet: 2 tab(s) orally every 6 hours, As needed, Mild Pain (1 - 3), Moderate Pain (4 - 6)  Aldactone 25 mg oral tablet: 1 tab(s) orally once a day  fluticasone 50 mcg/inh nasal spray: 1 spray(s) nasal 2 times a day, As needed, nasal congestion  Lipitor 40 mg oral tablet: 1 tab(s) orally once a day  losartan 50 mg oral tablet: 1 tab(s) orally once a day  metoprolol succinate 50 mg oral tablet, extended release: 1 tab(s) orally once a day  Nicorette 4 mg oral transmucosal gum: 1 gum oral transmucosal every 2 hours  pantoprazole 40 mg oral delayed release tablet: 1 tab(s) orally once a day (before a meal)  Plavix 75 mg oral tablet: 1 tab(s) orally once a day  rivaroxaban 20 mg oral tablet: 1 tab(s) orally once a day  torsemide 20 mg oral tablet: 2 tab(s) orally once a day  venlafaxine 150 mg oral capsule, extended release: 2 cap(s) orally once a day

## 2021-08-25 NOTE — PROGRESS NOTE ADULT - SUBJECTIVE AND OBJECTIVE BOX
incomplete    Interventional Cardiology PA Adult Progress Note    C.C.:     Subjective Assessment:      ROS Negative except as per Subjective and HPI  	  MEDICATIONS:  metoprolol succinate ER 50 milliGRAM(s) Oral daily  spironolactone 25 milliGRAM(s) Oral daily  torsemide 20 milliGRAM(s) Oral two times a day        acetaminophen   Tablet .. 650 milliGRAM(s) Oral every 6 hours PRN  venlafaxine XR. 300 milliGRAM(s) Oral daily      atorvastatin 40 milliGRAM(s) Oral at bedtime    chlorhexidine 4% Liquid 1 Application(s) Topical once  clopidogrel Tablet 75 milliGRAM(s) Oral daily  potassium chloride    Tablet ER 40 milliEquivalent(s) Oral every 4 hours  rivaroxaban 20 milliGRAM(s) Oral daily  sodium chloride 0.9%. 500 milliLiter(s) IV Continuous <Continuous>      	    [PHYSICAL EXAM:  TELEMETRY:  T(C): 36.1 (08-25-21 @ 13:29), Max: 36.9 (08-25-21 @ 10:01)  HR: 92 (08-25-21 @ 05:23) (74 - 92)  BP: 106/72 (08-25-21 @ 05:23) (106/72 - 145/69)  RR: 20 (08-25-21 @ 05:23) (20 - 21)  SpO2: 97% (08-25-21 @ 05:23) (96% - 97%)  Wt(kg): --  I&O's Summary    24 Aug 2021 07:01  -  25 Aug 2021 07:00  --------------------------------------------------------  IN: 385 mL / OUT: 2400 mL / NET: -2015 mL    25 Aug 2021 07:01  -  25 Aug 2021 14:08  --------------------------------------------------------  IN: 540 mL / OUT: 670 mL / NET: -130 mL        [place]:  Central/PICC/Mid Line:                                         Appearance: Normal	  HEENT:   Normal oral mucosa, PERRL, EOMI	  Neck: Supple, + JVD/ - JVD; Carotid Bruit   Cardiovascular: Normal S1 S2, No JVD, No murmurs,   Respiratory: Lungs clear to auscultation/Decreased Breath Sounds/No Rales, Rhonchi, Wheezing	  Gastrointestinal:  Soft, Non-tender, + BS	  Skin: No rashes, No ecchymoses, No cyanosis  Extremities: Normal range of motion, No clubbing, cyanosis or edema  Vascular: Peripheral pulses palpable 2+ bilaterally  Neurologic: Non-focal  Psychiatry: A & O x 3, Mood & affect appropriate      	    ECG:  	  RADIOLOGY:   DIAGNOSTIC TESTING:  [ ] Echocardiogram:  [ ]  Catheterization:  [ ] Stress Test:    [ ] AMINA  OTHER: 	    LABS:	 	  CARDIAC MARKERS:                                  12.8   8.89  )-----------( 330      ( 25 Aug 2021 06:56 )             40.3     08-25    137  |  92<L>  |  48<H>  ----------------------------<  116<H>  3.0<L>   |  31  |  1.12    Ca    9.8      25 Aug 2021 06:53  Mg     2.3     08-25    TPro  8.2  /  Alb  3.6  /  TBili  0.3  /  DBili  x   /  AST  37  /  ALT  67<H>  /  AlkPhos  161<H>  08-23    proBNP:   Lipid Profile:   HgA1c:   TSH:   PT/INR - ( 23 Aug 2021 20:27 )   PT: 17.6 sec;   INR: 1.52          PTT - ( 24 Aug 2021 15:05 )  PTT:102.9 sec    ASSESSMENT/PLAN: 	        DVT ppx:  Dispo:     Interventional Cardiology PA Adult Progress Note    Subjective Assessment: Patient seen and examined at bedside. Patient stating that "no one is paying attention to her." Patient demanding that someone "looks at her lungs" as "she came in for a lung problem and no one has fixed them." Patient not wanting to go home. Patient denies any other complaints.     ROS Negative except as per Subjective and HPI  	  MEDICATIONS:  metoprolol succinate ER 50 milliGRAM(s) Oral daily  spironolactone 25 milliGRAM(s) Oral daily  torsemide 20 milliGRAM(s) Oral two times a day  acetaminophen   Tablet .. 650 milliGRAM(s) Oral every 6 hours PRN  venlafaxine XR. 300 milliGRAM(s) Oral daily  atorvastatin 40 milliGRAM(s) Oral at bedtime  chlorhexidine 4% Liquid 1 Application(s) Topical once  clopidogrel Tablet 75 milliGRAM(s) Oral daily  potassium chloride    Tablet ER 40 milliEquivalent(s) Oral every 4 hours  rivaroxaban 20 milliGRAM(s) Oral daily  sodium chloride 0.9%. 500 milliLiter(s) IV Continuous <Continuous>      [PHYSICAL EXAM:  TELEMETRY:  T(C): 36.1 (08-25-21 @ 13:29), Max: 36.9 (08-25-21 @ 10:01)  HR: 92 (08-25-21 @ 05:23) (74 - 92)  BP: 106/72 (08-25-21 @ 05:23) (106/72 - 145/69)  RR: 20 (08-25-21 @ 05:23) (20 - 21)  SpO2: 97% (08-25-21 @ 05:23) (96% - 97%)  Wt(kg): --  I&O's Summary    24 Aug 2021 07:01  -  25 Aug 2021 07:00  --------------------------------------------------------  IN: 385 mL / OUT: 2400 mL / NET: -2015 mL    25 Aug 2021 07:01  -  25 Aug 2021 14:08  --------------------------------------------------------  IN: 540 mL / OUT: 670 mL / NET: -130 mL                                 Appearance: Normal, sitting comfortably in chair   HEENT:   Normal oral mucosa, PERRL, EOMI	  Neck: Supple, - JVD; Carotid Bruit   Cardiovascular: Normal S1 S2, No JVD, No murmurs,   Respiratory: + bibasilar rales   Gastrointestinal:  Soft, Non-tender, + BS	  Skin: No rashes, No ecchymoses, No cyanosis  Extremities: Normal, no clubbing or edema   Vascular: Peripheral pulses palpable 2+ bilaterally  Neurologic: Non-focal  Psychiatry: A & O x 3, Mood & affect appropriate  	    ECG:  	  RADIOLOGY:     DIAGNOSTIC TESTING:  [x ] Echocardiogram 08/24/2021:    1. Normal left and right ventricular size and systolic function.   2. Mild symmetric left ventricular hypertrophy.   3. Aortic sclerosis without significant stenosis.   4. Pulmonary artery systolic pressure is 33 mmHg.   5. No pericardial effusion      OTHER: 	    LABS:	 	  CARDIAC MARKERS:                        12.8   8.89  )-----------( 330      ( 25 Aug 2021 06:56 )             40.3     08-25    137  |  92<L>  |  48<H>  ----------------------------<  116<H>  3.0<L>   |  31  |  1.12    Ca    9.8      25 Aug 2021 06:53  Mg     2.3     08-25    TPro  8.2  /  Alb  3.6  /  TBili  0.3  /  DBili  x   /  AST  37  /  ALT  67<H>  /  AlkPhos  161<H>  08-23    proBNP: 201  PT/INR - ( 23 Aug 2021 20:27 )   PT: 17.6 sec;   INR: 1.52     PTT - ( 24 Aug 2021 15:05 )  PTT:102.9 sec

## 2021-08-25 NOTE — CONSULT NOTE ADULT - ASSESSMENT
72 y/o morbidly obese female with HTN, Hypercholesterolemia, known CAD with PCI of the LAD 3/22/21, diastolic CHF with EF 65% on 3/15/21 presenting with acute chest pain, taken for cardiac cath. S/p diagnostic angio, planned for medical management of CAD. Pulmonary service is consulted for chronic cough and bronchiectasis.     Chronic cough  - Patient with a history of chronic cough and multifactorial dyspnea in the setting of ongoing smoking.  70 y/o morbidly obese female with HTN, Hypercholesterolemia, known CAD with PCI of the LAD 3/22/21, diastolic CHF with EF 65% on 3/15/21 presenting with acute chest pain, taken for cardiac cath. S/p diagnostic angio, planned for medical management of CAD. Pulmonary service is consulted for chronic cough and bronchiectasis.     Chronic cough  - Patient with a history of chronic cough and multifactorial dyspnea in the setting of ongoing smoking. There is the possibility that her chronic cough is related to underlying obstructive airways disease and COPD. Additional Ddx include GERD vs post nasal drip. Her CT demonstrates some emphysema and bronchiectasis, although mild. She requires further testing as an outpatient to establish the diagnosis with PFTs. No evidence of ILD on her CT scan   - No indication to start inhalers at this time   - can trial fluitcasone nasal spray for post nasal drip, discussed with patient who is thinking about it  - consider PPI for potential silent aspiration  - Please arrange for follow up in fellows clinic for evaluation of COPD    Active smoker  - discussed >3 minutes smoking cessation, medical therapies and its impact on her life. currently precontemplative, hoping to go to rehab to be "forced to quit"  - Patient needs to be enrolled in yearly lung cancer screening program     Pulmonary service to sign off, please call with questions  70 y/o morbidly obese female with HTN, Hypercholesterolemia, known CAD with PCI of the LAD 3/22/21, diastolic CHF with EF 65% on 3/15/21 presenting with acute chest pain, taken for cardiac cath. S/p diagnostic angio, planned for medical management of CAD. Pulmonary service is consulted for chronic cough and bronchiectasis.     Chronic cough  - Patient with a history of chronic cough and multifactorial dyspnea in the setting of ongoing smoking. There is the possibility that her chronic cough is related to underlying obstructive airways disease and COPD. Additional Ddx include GERD vs post nasal drip. Her CT demonstrates some emphysema and bronchiectasis, although mild. She requires further testing as an outpatient to establish the diagnosis with PFTs. No evidence of ILD on her CT scan   - No indication to start inhalers at this time   - can trial fluitcasone nasal spray for post nasal drip, discussed with patient who is thinking about it  - consider PPI for potential silent aspiration/GERD  - Please arrange for follow up in fellows clinic for evaluation of COPD    Active smoker  - discussed >3 minutes smoking cessation, medical therapies and its impact on her life. currently precontemplative, hoping to go to rehab to be "forced to quit"  - Patient needs to be enrolled in yearly lung cancer screening program     Pulmonary service to sign off, please call with questions

## 2021-08-25 NOTE — PHYSICAL THERAPY INITIAL EVALUATION ADULT - GAIT DEVIATIONS NOTED, PT EVAL
multiple seated rest breaks 2/2 fatigue (3 seated rest breaks x 1 minute), inc lateral sway, B trendelenberg, slightly unsteady, sig MORRIS, desat to 88% on room air/increased time in double stance/decreased step length

## 2021-08-25 NOTE — PHYSICAL THERAPY INITIAL EVALUATION ADULT - ADDITIONAL COMMENTS
has broken rollator, lost 3 straight canes, has commode, and shower chair, 6 falls in past year, 6th floor walk up

## 2021-08-25 NOTE — DISCHARGE NOTE PROVIDER - NSDCCPCAREPLAN_GEN_ALL_CORE_FT
PRINCIPAL DISCHARGE DIAGNOSIS  Diagnosis: Rapid atrial fibrillation  Assessment and Plan of Treatment: You were found to be in atrial fibrillation (a type of irregular heartbeat) here in the hospital. People with atrial fibrillation are at an increased risk of forming a blood clot in the heart, which can travel to the brain, causing a stroke, or to other parts of the body. You have been placed on XARELTO 20 mg once daily, it is important you take this medication as directed. XARELTO lowers your chance of having a stroke by helping to prevent clots from forming. If you stop taking XARELTO, you may have increased risk of forming a blood clot in your heart which can cause a stroke. Do not stop taking XARELTO without talking to your cardiologist. Additionally it is important to make sure your heart rate stays controlled, you should continue METOPROLOL SUCCINATE (TOPROL XL) 50 mg once daily to help control your heart rate. Please follow-up with your cardiologist for further managment.        SECONDARY DISCHARGE DIAGNOSES  Diagnosis: CAD (coronary artery disease)  Assessment and Plan of Treatment: Please continue your XARELTO 20 mg once daily and your ASPIRIN 81 mg once daily. You underwent a repeat cardiac catheterization that showed that you did not have any new blockages in your arteries. Please follow-up with your cardiologist, Dr. Ji for further management.    Diagnosis: COPD, moderate  Assessment and Plan of Treatment: You were evaluated by the pulmonology team, who felt that you need a formal workup for COPD. Please follow-up with their team provided for furter management and workup.    Diagnosis: Encounter for smoking cessation counseling  Assessment and Plan of Treatment: If you smoke and already have heart or vascular disease, quitting smoking will reduce your risk of sudden cardiac death, heart attack, and death from other chronic diseases. Any amount of smoking, even light smoking or occasional smoking, damages the heart and blood vessels. One of the best ways to reduce your risk of heart disease is to avoid tobacco smoke. No matter how much or how long you've smoked, quitting will benefit you. We have provided you with resources to help with smoking cessation and we strongly recommend that you use the information provided and follow up with your outpatient physician to help with setting up a “quit date” if you have not already.  Please continue to cut back on your cigarette usage and using your lozenges.     PRINCIPAL DISCHARGE DIAGNOSIS  Diagnosis: Rapid atrial fibrillation  Assessment and Plan of Treatment: You were found to be in atrial fibrillation (a type of irregular heartbeat) here in the hospital. People with atrial fibrillation are at an increased risk of forming a blood clot in the heart, which can travel to the brain, causing a stroke, or to other parts of the body. You have been placed on XARELTO 20 mg once daily, it is important you take this medication as directed. XARELTO lowers your chance of having a stroke by helping to prevent clots from forming. If you stop taking XARELTO, you may have increased risk of forming a blood clot in your heart which can cause a stroke. Do not stop taking XARELTO without talking to your cardiologist. Additionally it is important to make sure your heart rate stays controlled, you should continue METOPROLOL SUCCINATE (TOPROL XL) 50 mg once daily to help control your heart rate. Please follow-up with your cardiologist for further managment.        SECONDARY DISCHARGE DIAGNOSES  Diagnosis: CAD (coronary artery disease)  Assessment and Plan of Treatment: Please continue your XARELTO 20 mg once daily and your ASPIRIN 81 mg once daily. You underwent a repeat cardiac catheterization that showed that you did not have any new blockages in your arteries. Please follow-up with your cardiologist, Dr. Ji for further management.    Diagnosis: COPD, moderate  Assessment and Plan of Treatment: You were evaluated by the pulmonology team, who felt that you need a formal workup for COPD. Please follow-up with their team provided for furter management and workup.    Diagnosis: Encounter for smoking cessation counseling  Assessment and Plan of Treatment: If you smoke and already have heart or vascular disease, quitting smoking will reduce your risk of sudden cardiac death, heart attack, and death from other chronic diseases. Any amount of smoking, even light smoking or occasional smoking, damages the heart and blood vessels. One of the best ways to reduce your risk of heart disease is to avoid tobacco smoke. No matter how much or how long you've smoked, quitting will benefit you. We have provided you with resources to help with smoking cessation and we strongly recommend that you use the information provided and follow up with your outpatient physician to help with setting up a “quit date” if you have not already.  Please continue to cut back on your cigarette usage and using your lozenges.    Diagnosis: Acute on chronic diastolic congestive heart failure  Assessment and Plan of Treatment: -You have a history of weakened heart muscle called congestive heart failure. This means that your heart does not pump blood to the rest of the body as it normally should. You should take (INSERT DIURETIC) to help reduce the amount of excess fluid that can back up to the legs, kidneys, and lungs.    -Please make sure you follow up with your cardiologist within one week of discharge.   -Please weigh yourself daily: if you have gained more than 2-3 lbs in one day or 5 lbs in one week contact your doctor immediately as you may be retaining water weight  -In addition, restrict your salt intake to less than 2 grams a day  - PLEASE FOLLOW-up with Dr. Ji in 1 week.   - Please continue TORSEMIDE 40 mg once daily and Spironolactone 25 mg once daily.   -If you develop worsening shortening of breath, leg swelling, fatigue, chest pain, difficulty sleeping at night due to shortness of breath, contact your cardiologist immediately.

## 2021-08-26 PROCEDURE — 99232 SBSQ HOSP IP/OBS MODERATE 35: CPT

## 2021-08-26 RX ORDER — ZALEPLON 10 MG
5 CAPSULE ORAL ONCE
Refills: 0 | Status: DISCONTINUED | OUTPATIENT
Start: 2021-08-26 | End: 2021-08-26

## 2021-08-26 RX ORDER — BENZOCAINE AND MENTHOL 5; 1 G/100ML; G/100ML
1 LIQUID ORAL THREE TIMES A DAY
Refills: 0 | Status: DISCONTINUED | OUTPATIENT
Start: 2021-08-26 | End: 2021-08-26

## 2021-08-26 RX ORDER — VENLAFAXINE HCL 75 MG
2 CAPSULE, EXT RELEASE 24 HR ORAL
Qty: 0 | Refills: 0 | DISCHARGE
Start: 2021-08-26

## 2021-08-26 RX ORDER — FLUTICASONE PROPIONATE 50 MCG
1 SPRAY, SUSPENSION NASAL
Qty: 0 | Refills: 0 | DISCHARGE
Start: 2021-08-26

## 2021-08-26 RX ORDER — LIDOCAINE 4 G/100G
1 CREAM TOPICAL EVERY 24 HOURS
Refills: 0 | Status: DISCONTINUED | OUTPATIENT
Start: 2021-08-26 | End: 2021-08-27

## 2021-08-26 RX ORDER — ACETAMINOPHEN 500 MG
1000 TABLET ORAL ONCE
Refills: 0 | Status: DISCONTINUED | OUTPATIENT
Start: 2021-08-26 | End: 2021-08-27

## 2021-08-26 RX ORDER — BENZOCAINE AND MENTHOL 5; 1 G/100ML; G/100ML
1 LIQUID ORAL THREE TIMES A DAY
Refills: 0 | Status: DISCONTINUED | OUTPATIENT
Start: 2021-08-26 | End: 2021-08-27

## 2021-08-26 RX ORDER — ZALEPLON 10 MG
5 CAPSULE ORAL AT BEDTIME
Refills: 0 | Status: DISCONTINUED | OUTPATIENT
Start: 2021-08-26 | End: 2021-08-27

## 2021-08-26 RX ORDER — NICOTINE POLACRILEX 2 MG
1 GUM BUCCAL
Qty: 360 | Refills: 3
Start: 2021-08-26 | End: 2021-12-23

## 2021-08-26 RX ORDER — SPIRONOLACTONE 25 MG/1
1 TABLET, FILM COATED ORAL
Qty: 0 | Refills: 0 | DISCHARGE
Start: 2021-08-26

## 2021-08-26 RX ORDER — RIVAROXABAN 15 MG-20MG
1 KIT ORAL
Qty: 0 | Refills: 0 | DISCHARGE
Start: 2021-08-26

## 2021-08-26 RX ORDER — PANTOPRAZOLE SODIUM 20 MG/1
1 TABLET, DELAYED RELEASE ORAL
Qty: 0 | Refills: 0 | DISCHARGE
Start: 2021-08-26

## 2021-08-26 RX ADMIN — Medication 650 MILLIGRAM(S): at 20:46

## 2021-08-26 RX ADMIN — ATORVASTATIN CALCIUM 40 MILLIGRAM(S): 80 TABLET, FILM COATED ORAL at 21:56

## 2021-08-26 RX ADMIN — BENZOCAINE AND MENTHOL 1 LOZENGE: 5; 1 LIQUID ORAL at 14:31

## 2021-08-26 RX ADMIN — Medication 5 MILLIGRAM(S): at 23:11

## 2021-08-26 RX ADMIN — Medication 4 MILLIGRAM(S): at 12:43

## 2021-08-26 RX ADMIN — BENZOCAINE AND MENTHOL 1 LOZENGE: 5; 1 LIQUID ORAL at 21:57

## 2021-08-26 RX ADMIN — Medication 4 MILLIGRAM(S): at 23:11

## 2021-08-26 RX ADMIN — Medication 300 MILLIGRAM(S): at 12:39

## 2021-08-26 RX ADMIN — CLOPIDOGREL BISULFATE 75 MILLIGRAM(S): 75 TABLET, FILM COATED ORAL at 12:37

## 2021-08-26 RX ADMIN — Medication 650 MILLIGRAM(S): at 21:46

## 2021-08-26 RX ADMIN — Medication 650 MILLIGRAM(S): at 10:49

## 2021-08-26 RX ADMIN — Medication 4 MILLIGRAM(S): at 06:42

## 2021-08-26 RX ADMIN — PANTOPRAZOLE SODIUM 40 MILLIGRAM(S): 20 TABLET, DELAYED RELEASE ORAL at 06:40

## 2021-08-26 RX ADMIN — Medication 5 MILLIGRAM(S): at 03:45

## 2021-08-26 RX ADMIN — LOSARTAN POTASSIUM 50 MILLIGRAM(S): 100 TABLET, FILM COATED ORAL at 12:36

## 2021-08-26 RX ADMIN — Medication 50 MILLIGRAM(S): at 06:40

## 2021-08-26 RX ADMIN — LIDOCAINE 1 PATCH: 4 CREAM TOPICAL at 23:11

## 2021-08-26 RX ADMIN — Medication 40 MILLIGRAM(S): at 06:40

## 2021-08-26 RX ADMIN — SPIRONOLACTONE 25 MILLIGRAM(S): 25 TABLET, FILM COATED ORAL at 12:38

## 2021-08-26 RX ADMIN — RIVAROXABAN 20 MILLIGRAM(S): KIT at 12:35

## 2021-08-26 RX ADMIN — Medication 650 MILLIGRAM(S): at 09:49

## 2021-08-26 RX ADMIN — Medication 4 MILLIGRAM(S): at 17:09

## 2021-08-26 NOTE — PROGRESS NOTE ADULT - SUBJECTIVE AND OBJECTIVE BOX
Interventional Cardiology PA Adult Progress Note    Subjective Assessment: Patient seen and examined at bedside. Patient first stating that she had an "episode" over night where she could not sleep, but then asked for Xanax, which did not help. Patient then stated that she was "so grateful" that someone gave her medication to help her sleep, while also becoming frustrated. Patient again stating "that no one has addressed her issues and everyone is giving her neighbor attention and not he." Patient then becoming increasingly upset and starting to cry. Patient without any other complaints.     ROS Negative except as per Subjective and HPI  	  MEDICATIONS:  losartan 50 milliGRAM(s) Oral daily  metoprolol succinate ER 50 milliGRAM(s) Oral daily  spironolactone 25 milliGRAM(s) Oral daily  torsemide 40 milliGRAM(s) Oral daily  acetaminophen   Tablet .. 650 milliGRAM(s) Oral every 6 hours PRN  venlafaxine XR. 300 milliGRAM(s) Oral daily  pantoprazole    Tablet 40 milliGRAM(s) Oral before breakfast  atorvastatin 40 milliGRAM(s) Oral at bedtime  benzocaine 15 mG/menthol 3.6 mG Lozenge 1 Lozenge Oral three times a day  chlorhexidine 4% Liquid 1 Application(s) Topical once  clopidogrel Tablet 75 milliGRAM(s) Oral daily  fluticasone propionate 50 MICROgram(s)/spray Nasal Spray 1 Spray(s) Both Nostrils two times a day PRN  rivaroxaban 20 milliGRAM(s) Oral daily  sodium chloride 0.9%. 500 milliLiter(s) IV Continuous <Continuous>    [PHYSICAL EXAM:  TELEMETRY:  T(C): 36.9 (08-26-21 @ 13:30), Max: 37.1 (08-26-21 @ 06:06)  HR: 82 (08-26-21 @ 12:45) (82 - 101)  BP: 121/92 (08-26-21 @ 12:45) (117/90 - 132/62)  RR: 20 (08-26-21 @ 12:45) (20 - 23)  SpO2: 96% (08-26-21 @ 12:45) (96% - 97%)  Wt(kg): --  I&O's Summary    25 Aug 2021 07:01  -  26 Aug 2021 07:00  --------------------------------------------------------  IN: 960 mL / OUT: 1790 mL / NET: -830 mL    26 Aug 2021 07:01  -  26 Aug 2021 14:10  --------------------------------------------------------  IN: 300 mL / OUT: 300 mL / NET: 0 mL    Appearance: Normal, sitting comfortably in chair   HEENT:   Normal oral mucosa, PERRL, EOMI	  Neck: Supple, - JVD; Carotid Bruit   Cardiovascular: Normal S1 S2, No JVD, No murmurs,   Respiratory: + bibasilar rales   Gastrointestinal:  Soft, Non-tender, + BS	  Skin: No rashes, No ecchymoses, No cyanosis  Extremities: Normal, no clubbing or edema   Vascular: Peripheral pulses palpable 2+ bilaterally  Neurologic: Non-focal  Psychiatry: A & O x 3, Mood & affect appropriate  	    ECG:  	  RADIOLOGY:     DIAGNOSTIC TESTING:  [x ] Echocardiogram 08/24/2021:    1. Normal left and right ventricular size and systolic function.   2. Mild symmetric left ventricular hypertrophy.   3. Aortic sclerosis without significant stenosis.   4. Pulmonary artery systolic pressure is 33 mmHg.   5. No pericardial effusion    LABS:	 	  CARDIAC MARKERS:                       12.8   8.89  )-----------( 330      ( 25 Aug 2021 06:56 )             40.3     08-25    137  |  92<L>  |  48<H>  ----------------------------<  116<H>  3.0<L>   |  31  |  1.12    Ca    9.8      25 Aug 2021 06:53  Mg     2.3     08-25      proBNP: 201  PTT - ( 24 Aug 2021 15:05 )  PTT:102.9 sec

## 2021-08-26 NOTE — PROGRESS NOTE ADULT - ASSESSMENT
71 y.o female, obese with HTN, HYP, known CAD s/p LAD stent in March 2021, presented with AFib RVR, chest pain.  Patient troponin .03 x 2. Patient self-converted to NSR. Patient underwent LHC which was diagnostic.  Patient recently with persistent URI producing copious amounts of yellow sputum. Pulm consulted for further evaluation - nothing to do at this time. Patient desat on ambulation to 88% on RA, awaiting [facility] authorization.   
71 y.o female, obese with HTN, HYP, known CAD s/p LAD stent in March 2021, presented with AFib RVR, chest pain.  Patient troponin .03 x 2. Patient self-converted to NSR. Patient underwent LHC which was diagnostic.  Patient recently with persistent URI producing copious amounts of yellow sputum. Pulm consulted for further evaluation - nothing to do at this time. Patient desat on ambulation to 88% on RA, awaiting [facility] authorization.

## 2021-08-26 NOTE — PROGRESS NOTE ADULT - TIME BILLING
71 morbidly obese F smoker with HTN, HLD, known CAD with PCI LAD, Chronic diastolic CHFpEF 65% transferred from [facility] for further management of rapid afib. Patient auto converted to NSR and has e/o paroxysmal afib on telemetry monitoring. This morning, patient c/o chest pressure and pain radiating to left arm which patient states is similar to when she had prior MI.  LHC with FFR negative RCA, MMT advised. Ambulatory saturation test O2 88% on RA.   Plan for:  Plavix/Xarelto, no ASA to avoid 3x tx  High intensity statin Atorva 40mg qHS  Metoprolol 50XL daily, Spironolactone 25 daily  Losartan 50 suspended, BPs controlled on BB and spironolactone  Pulmonary input appreciated, will initiate PPI and fluticasone spray for GERD and post nasal drip  -->smoking cessation counselling given on multiple occasions  -->incentive spirometer given to patient with proper teaching  -->need referral to outpt pulm clinic for COPD evaluation  PT recommends [facility], SW assisting with placement.  TID vitals, no labs unless necessary to avoid excessive phlebotomy  Dispo pending [facility] bed availability  Details discussed with the patient and cardiac care team  Adan Parsons M.D.  Cardiology Attending
71 morbidly obese F smoker with HTN, HLD, known CAD with PCI LAD, Chronic diastolic CHFpEF 65% transferred from [facility] for further management of rapid afib. Patient auto converted to NSR and has e/o paroxysmal afib on telemetry monitoring. This morning, patient c/o chest pressure and pain radiating to left arm which patient states is similar to when she had prior MI.  LHC with FFR negative RCA, MMT advised. Ambulatory saturation test O2 88% on RA.   Plan for:  Plavix/Xarelto, no ASA to avoid 3x tx  Atorva 40mg qHS, Metoprolol 50XL daily, Spironolactone 25 daily  Losartan 50 suspended, BPs controlled on BB and spironolactone  Dispo pending [facility] bed availability  Details discussed with the patient and cardiac care team  Adan Parsons M.D.  Cardiology Attending

## 2021-08-26 NOTE — PROGRESS NOTE ADULT - PROBLEM SELECTOR PLAN 1
Patient arrived to [facility] with , relieved with Diltiazem x 2  - Self-converted to NSR on arrival to [facility]   - Continue Toprol XL 50 mg once daily for rate control   - Continue Xarelto 20 mg once daily for A/C
Patient arrived to [facility] with , relieved with Diltiazem x 2  - Self-converted to NSR on arrival to [facility]   - Continue Toprol XL 50 mg once daily for rate control   - Continue Xarelto 20 mg once daily for A/C

## 2021-08-26 NOTE — PROGRESS NOTE ADULT - NUTRITIONAL ASSESSMENT
This patient has been assessed with a concern for Malnutrition and has been determined to have a diagnosis/diagnoses of Morbid obesity (BMI > 40).    This patient is being managed with:   Diet DASH/TLC-  Sodium & Cholesterol Restricted  1000mL Fluid Restriction ([id number])  Entered: Aug 24 2021  6:17PM    
This patient has been assessed with a concern for Malnutrition and has been determined to have a diagnosis/diagnoses of Morbid obesity (BMI > 40).    This patient is being managed with:   Diet DASH/TLC-  Sodium & Cholesterol Restricted  1000mL Fluid Restriction ([id number])  Entered: Aug 24 2021  6:17PM

## 2021-08-26 NOTE — PROGRESS NOTE ADULT - PROBLEM SELECTOR PLAN 2
Known CAD with prior PCI of LAD, was supposed to return for PCI of residual RCA 20-60% in July but did not because of upper respiratory infection  - S/P [facility] on 08/25/2021 -  LM no significant disease, LAD stent widely patent, pLCx 30% calcified, mRCA 50-80% w/ IFR 0.97, R TR x2 at 10p  - Echo 8/24/21: Normal biV size and systolic function, mild LVH, aortic sclerosis w/o significant stenosis, PASP 33mmHg, no pericardial effusion.  - Continue Plavix 75 mg once daily, Xarelto 20 mg once daily, Toprol XL 25 mg once daily, and Atorva 40 mg once daily
Known CAD with prior PCI of LAD, was supposed to return for PCI of residual RCA 20-60% in July but did not because of upper respiratory infection  - S/P [facility] on 08/25/2021 -  LM no significant disease, LAD stent widely patent, pLCx 30% calcified, mRCA 50-80% w/ IFR 0.97, R TR x2 at 10p  - Echo 8/24/21: Normal biV size and systolic function, mild LVH, aortic sclerosis w/o significant stenosis, PASP 33mmHg, no pericardial effusion.  - Continue Plavix 75 mg once daily, Xarelto 20 mg once daily, Toprol XL 25 mg once daily, and Atorva 40 mg once daily

## 2021-08-26 NOTE — CHART NOTE - NSCHARTNOTEFT_GEN_A_CORE
Admitting Diagnosis:   Patient is a 71y old  Female who presents with a chief complaint of AFib with RVR, Chest pain at rest (25 Aug 2021 18:52)      PAST MEDICAL & SURGICAL HISTORY:  Hepatitis C    Fibromyalgia    Herniated disc, cervical    Coronary artery disease    Colon polyp    Myocardial infarction, unspecified MI type, unspecified artery    HLD (hyperlipidemia)    HTN (hypertension)    Current Nutrition Order: DASH/TLC Diet 1000 mL Fluid Restriction    PO Intake: Good (%) [ x ]  Fair (50-75%) [   ] Poor (<25%) [   ]    GI Issues: Denies nausea/vomiting at this time. Confirms last bowel movement .     Pain: Endorses severe pain at time of RD interview secondary to fibromyalgia and lack of sleep the night prior. Tylenol daily.    Skin Integrity: Right leg, ankle, foot edema 2+; left leg, ankle edema 3+. Surgical incision right wrist per chart. No pressure injuries documented at this time. Da score: 20.    Labs:       137  |  92<L>  |  48<H>  ----------------------------<  116<H>  3.0<L>   |  31  |  1.12    Ca    9.8      25 Aug 2021 06:53  Mg     2.3           CAPILLARY BLOOD GLUCOSE          Medications:  MEDICATIONS  (STANDING):  atorvastatin 40 milliGRAM(s) Oral at bedtime  benzocaine 15 mG/menthol 3.6 mG Lozenge 1 Lozenge Oral three times a day  chlorhexidine 4% Liquid 1 Application(s) Topical once  clopidogrel Tablet 75 milliGRAM(s) Oral daily  losartan 50 milliGRAM(s) Oral daily  metoprolol succinate ER 50 milliGRAM(s) Oral daily  nicotine  Polacrilex Lozenge 4 milliGRAM(s) Oral four times a day  pantoprazole    Tablet 40 milliGRAM(s) Oral before breakfast  rivaroxaban 20 milliGRAM(s) Oral daily  sodium chloride 0.9%. 500 milliLiter(s) (75 mL/Hr) IV Continuous <Continuous>  spironolactone 25 milliGRAM(s) Oral daily  torsemide 40 milliGRAM(s) Oral daily  venlafaxine XR. 300 milliGRAM(s) Oral daily    MEDICATIONS  (PRN):  acetaminophen   Tablet .. 650 milliGRAM(s) Oral every 6 hours PRN Mild Pain (1 - 3), Moderate Pain (4 - 6)  fluticasone propionate 50 MICROgram(s)/spray Nasal Spray 1 Spray(s) Both Nostrils two times a day PRN nasal congestion      Weight:  · Height for BMI (FEET)	5 Feet  · Height for BMI (INCHES)	2 Inch(s)  · Height for BMI (CENTIMETERS)	157.48 Centimeter(s)  · Weight for BMI (lbs)	265 lb  · Weight for BMI (kg)	120.4 kg  · Body Mass Index	106.8  · Ideal Body Weight (lbs)	110  · Ideal Body Weight (kg)	49.8    Weight Change: Per initial RD assessment: 250 pounds (3/24/21); 15 pounds/6% weight gain x 4 months.     Daily weight : 261 pounds/ 118.4 kg; 4 pound weight loss/1.5% likely attributed to fluid loss, currently being diuresed with spironolactone.     Estimated energy needs:  Defer fluids to team secondary to CHF. Based on Standards of Care pt >% IBW thus ideal body weight used for all calculations. Needs adjusted for advanced age.  20-25 kcal/k-1245 kcal  1.1-1.3 g/k.78-64.74 g    Subjective: 71 y.o female, obese with HTN, HYP, known CAD s/p LAD stent in 2021, presented with AFib RVR, chest pain.  Patient troponin .03 x 2. Patient self-converted to NSR. Patient underwent LHC which was diagnostic.  Patient recently with persistent URI producing copious amounts of yellow sputum. Pulm consulted for further evaluation - nothing to do at this time. Patient desat on ambulation to 88% on RA, awaiting [facility] authorization.     Pt seen at bedside for follow up assessment on RA. Labs reviewed : noted with hypokalemia; will continue to monitor. All other electrolytes within normal limits at this time. Pt endorses severe pain at time of RD interview; attributes fibromyalgia flare up to lack of sleep. Discussed current diet order: DASH/TLC with 1000 mL fluid restriction. Pt aware of current diet order and fluid restriction; provided pt with diet ed reinforcement. Observed breakfast tray 100% completed; pt endorses good PO intake during hospital stay able to complete 100% of meals. Made aware RD remains available. RD to follow up per protocol.    Previous Nutrition Diagnosis: Overweight/obesity r/t multiple unsuccessful attempts at weight loss AEB BMI: 48.5    Active [  x ]  Resolved [   ]    If resolved, new PES:     Goal: Pt to lose weight and trend toward IBW range.    Recommendations:  1. Continue with current diet order; defer fluid res to team   2. Encourage pt to continue to meet nutritional needs as able  3. Encourage adherence to diet education   4. Monitor electrolytes; replete PRN   5. Defer pain and bowel regimen to team    Education: Reinforced previous diet education    Risk Level: High [   ] Moderate [  ] Low [  x ]

## 2021-08-26 NOTE — PROGRESS NOTE ADULT - PROBLEM SELECTOR PLAN 5
O2 88 on RA, coarse breath sounds but clear lungs, NL CXR  - pulm consulted - likely chronic - CT demonstrates some emphysema and bronchiectasis, although mild. She requires further testing as an outpatient to establish the diagnosis with PFTs. No evidence of ILD on her CT scan   - Encourage follow-up for COPD testing as outpatient    F: None  E: K > 4, Mg >2  N: DASH/TLC, fluid restriction  Dvt: Eliquis   GI: Pantoprazole   Dispo: pending LORE authorization     Case discussed with Dr. Elizabeth
O2 88 on RA, coarse breath sounds but clear lungs, NL CXR  - pulm consulted - likely chronic - CT demonstrates some emphysema and bronchiectasis, although mild. She requires further testing as an outpatient to establish the diagnosis with PFTs. No evidence of ILD on her CT scan   - Encourage follow-up for COPD testing as outpatient    F: None  E: K > 4, Mg >2  N: DASH/TLC, fluid restriction  Dvt: Eliquis   GI: Pantoprazole   Dispo: pending LORE authorization     Case discussed with Dr. Elizabeth

## 2021-08-26 NOTE — PROGRESS NOTE ADULT - ATTENDING COMMENTS
71 morbidly obese F smoker with HTN, HLD, known CAD with PCI LAD, Chronic diastolic CHFpEF 65% transferred from [facility] for further management of rapid afib. Patient auto converted to NSR and has e/o paroxysmal afib on telemetry monitoring. This morning, patient c/o chest pressure and pain radiating to left arm which patient states is similar to when she had prior MI.  LHC with FFR negative RCA, MMT advised. Ambulatory saturation test O2 88% on RA.   Plan for:  Plavix/Xarelto, no ASA to avoid 3x tx  High intensity statin Atorva 40mg qHS  Metoprolol 50XL daily, Spironolactone 25 daily  Losartan 50 suspended, BPs controlled on BB and spironolactone  Pulmonary input appreciated, will initiate PPI and fluticasone spray for GERD and post nasal drip  -->smoking cessation counselling given on multiple occasions  -->incentive spirometer given to patient with proper teaching  -->need referral to outpt pulm clinic for COPD evaluation  PT recommends LORE, SW assisting with placement.  TID vitals, no labs unless necessary to avoid excessive phlebotomy  Dispo pending LORE bed availability  Details discussed with the patient and cardiac care team  Adan Parsons M.D.  Cardiology Attending
71 morbidly obese F smoker with HTN, HLD, known CAD with PCI LAD, Chronic diastolic CHFpEF 65% transferred from [facility] for further management of rapid afib. Patient auto converted to NSR and has e/o paroxysmal afib on telemetry monitoring. This morning, patient c/o chest pressure and pain radiating to left arm which patient states is similar to when she had prior MI.  LHC with FFR negative RCA, MMT advised. Ambulatory saturation test O2 88% on RA.   Plan for:  Plavix/Xarelto, no ASA to avoid 3x tx  Atorva 40mg qHS, Metoprolol 50XL daily, Spironolactone 25 daily  Losartan 50 suspended, BPs controlled on BB and spironolactone  Dispo pending LORE bed availability  Details discussed with the patient and cardiac care team  Adan Parsons M.D.  Cardiology Attending

## 2021-08-26 NOTE — PROGRESS NOTE ADULT - PROBLEM SELECTOR PLAN 4
WBC 11.9, T 100.1 rectally; CXR negative  - NGTD with blood or urine culture   - Continue to monitor
WBC 11.9, T 100.1 rectally; CXR negative  - NGTD with blood or urine culture   - Continue to monitor

## 2021-08-27 ENCOUNTER — TRANSCRIPTION ENCOUNTER (OUTPATIENT)
Age: 71
End: 2021-08-27

## 2021-08-27 VITALS — TEMPERATURE: 98 F

## 2021-08-27 LAB
HCT VFR BLD CALC: 38.5 % — SIGNIFICANT CHANGE UP (ref 34.5–45)
HGB BLD-MCNC: 12.2 G/DL — SIGNIFICANT CHANGE UP (ref 11.5–15.5)
MCHC RBC-ENTMCNC: 27.9 PG — SIGNIFICANT CHANGE UP (ref 27–34)
MCHC RBC-ENTMCNC: 31.7 GM/DL — LOW (ref 32–36)
MCV RBC AUTO: 87.9 FL — SIGNIFICANT CHANGE UP (ref 80–100)
NRBC # BLD: 0 /100 WBCS — SIGNIFICANT CHANGE UP (ref 0–0)
PLATELET # BLD AUTO: 322 K/UL — SIGNIFICANT CHANGE UP (ref 150–400)
RBC # BLD: 4.38 M/UL — SIGNIFICANT CHANGE UP (ref 3.8–5.2)
RBC # FLD: 15.8 % — HIGH (ref 10.3–14.5)
WBC # BLD: 9.34 K/UL — SIGNIFICANT CHANGE UP (ref 3.8–10.5)
WBC # FLD AUTO: 9.34 K/UL — SIGNIFICANT CHANGE UP (ref 3.8–10.5)

## 2021-08-27 PROCEDURE — 99239 HOSP IP/OBS DSCHRG MGMT >30: CPT

## 2021-08-27 PROCEDURE — 93306 TTE W/DOPPLER COMPLETE: CPT

## 2021-08-27 PROCEDURE — 85025 COMPLETE CBC W/AUTO DIFF WBC: CPT

## 2021-08-27 PROCEDURE — 99285 EMERGENCY DEPT VISIT HI MDM: CPT | Mod: 25

## 2021-08-27 PROCEDURE — 80053 COMPREHEN METABOLIC PANEL: CPT

## 2021-08-27 PROCEDURE — 85730 THROMBOPLASTIN TIME PARTIAL: CPT

## 2021-08-27 PROCEDURE — C1894: CPT

## 2021-08-27 PROCEDURE — 83735 ASSAY OF MAGNESIUM: CPT

## 2021-08-27 PROCEDURE — C1887: CPT

## 2021-08-27 PROCEDURE — 85610 PROTHROMBIN TIME: CPT

## 2021-08-27 PROCEDURE — 81003 URINALYSIS AUTO W/O SCOPE: CPT

## 2021-08-27 PROCEDURE — 85379 FIBRIN DEGRADATION QUANT: CPT

## 2021-08-27 PROCEDURE — 83880 ASSAY OF NATRIURETIC PEPTIDE: CPT

## 2021-08-27 PROCEDURE — 80048 BASIC METABOLIC PNL TOTAL CA: CPT

## 2021-08-27 PROCEDURE — 97161 PT EVAL LOW COMPLEX 20 MIN: CPT

## 2021-08-27 PROCEDURE — 96375 TX/PRO/DX INJ NEW DRUG ADDON: CPT

## 2021-08-27 PROCEDURE — 93005 ELECTROCARDIOGRAM TRACING: CPT

## 2021-08-27 PROCEDURE — 84443 ASSAY THYROID STIM HORMONE: CPT

## 2021-08-27 PROCEDURE — 85027 COMPLETE CBC AUTOMATED: CPT

## 2021-08-27 PROCEDURE — 96374 THER/PROPH/DIAG INJ IV PUSH: CPT

## 2021-08-27 PROCEDURE — 84484 ASSAY OF TROPONIN QUANT: CPT

## 2021-08-27 PROCEDURE — C1769: CPT

## 2021-08-27 PROCEDURE — 36415 COLL VENOUS BLD VENIPUNCTURE: CPT

## 2021-08-27 PROCEDURE — 71046 X-RAY EXAM CHEST 2 VIEWS: CPT

## 2021-08-27 PROCEDURE — 86803 HEPATITIS C AB TEST: CPT

## 2021-08-27 PROCEDURE — 87635 SARS-COV-2 COVID-19 AMP PRB: CPT

## 2021-08-27 RX ORDER — ACETAMINOPHEN 500 MG
2 TABLET ORAL
Qty: 0 | Refills: 0 | DISCHARGE
Start: 2021-08-27

## 2021-08-27 RX ADMIN — SPIRONOLACTONE 25 MILLIGRAM(S): 25 TABLET, FILM COATED ORAL at 06:32

## 2021-08-27 RX ADMIN — Medication 650 MILLIGRAM(S): at 09:36

## 2021-08-27 RX ADMIN — BENZOCAINE AND MENTHOL 1 LOZENGE: 5; 1 LIQUID ORAL at 06:33

## 2021-08-27 RX ADMIN — Medication 4 MILLIGRAM(S): at 04:39

## 2021-08-27 RX ADMIN — LIDOCAINE 1 PATCH: 4 CREAM TOPICAL at 07:39

## 2021-08-27 RX ADMIN — Medication 650 MILLIGRAM(S): at 10:30

## 2021-08-27 RX ADMIN — CLOPIDOGREL BISULFATE 75 MILLIGRAM(S): 75 TABLET, FILM COATED ORAL at 12:36

## 2021-08-27 RX ADMIN — PANTOPRAZOLE SODIUM 40 MILLIGRAM(S): 20 TABLET, DELAYED RELEASE ORAL at 06:32

## 2021-08-27 RX ADMIN — Medication 40 MILLIGRAM(S): at 06:32

## 2021-08-27 RX ADMIN — Medication 4 MILLIGRAM(S): at 10:07

## 2021-08-27 RX ADMIN — RIVAROXABAN 20 MILLIGRAM(S): KIT at 12:38

## 2021-08-27 RX ADMIN — Medication 300 MILLIGRAM(S): at 12:37

## 2021-08-27 RX ADMIN — Medication 50 MILLIGRAM(S): at 09:31

## 2021-08-27 NOTE — DISCHARGE NOTE NURSING/CASE MANAGEMENT/SOCIAL WORK - NSDCPEWEB_GEN_ALL_CORE
[facility] for Tobacco Control website --- [email or web address] website --- [email or web address]

## 2021-08-27 NOTE — DISCHARGE NOTE NURSING/CASE MANAGEMENT/SOCIAL WORK - PATIENT PORTAL LINK FT
You can access the FollowMyHealth Patient Portal offered by [facility] by registering at the following website: [email or web address]. By joining [employer]’s FollowMyHealth portal, you will also be able to view your health information using other applications (apps) compatible with our system.

## 2021-08-27 NOTE — DISCHARGE NOTE NURSING/CASE MANAGEMENT/SOCIAL WORK - NSDCPEEMAIL_GEN_ALL_CORE
[facility] for Tobacco Control email [email or web address]

## 2021-08-27 NOTE — DISCHARGE NOTE NURSING/CASE MANAGEMENT/SOCIAL WORK - NSDCPEFALRISK_GEN_ALL_CORE
For information on Fall & injury Prevention, visit [email or web address]

## 2021-08-30 ENCOUNTER — APPOINTMENT (OUTPATIENT)
Dept: ORTHOPEDIC SURGERY | Facility: CLINIC | Age: 71
End: 2021-08-30

## 2021-08-31 DIAGNOSIS — J47.9 BRONCHIECTASIS, UNCOMPLICATED: ICD-10-CM

## 2021-08-31 DIAGNOSIS — D72.829 ELEVATED WHITE BLOOD CELL COUNT, UNSPECIFIED: ICD-10-CM

## 2021-08-31 DIAGNOSIS — I11.0 HYPERTENSIVE HEART DISEASE WITH HEART FAILURE: ICD-10-CM

## 2021-08-31 DIAGNOSIS — K21.9 GASTRO-ESOPHAGEAL REFLUX DISEASE WITHOUT ESOPHAGITIS: ICD-10-CM

## 2021-08-31 DIAGNOSIS — I25.2 OLD MYOCARDIAL INFARCTION: ICD-10-CM

## 2021-08-31 DIAGNOSIS — Z95.5 PRESENCE OF CORONARY ANGIOPLASTY IMPLANT AND GRAFT: ICD-10-CM

## 2021-08-31 DIAGNOSIS — Z79.02 LONG TERM (CURRENT) USE OF ANTITHROMBOTICS/ANTIPLATELETS: ICD-10-CM

## 2021-08-31 DIAGNOSIS — I50.33 ACUTE ON CHRONIC DIASTOLIC (CONGESTIVE) HEART FAILURE: ICD-10-CM

## 2021-08-31 DIAGNOSIS — I25.10 ATHEROSCLEROTIC HEART DISEASE OF NATIVE CORONARY ARTERY WITHOUT ANGINA PECTORIS: ICD-10-CM

## 2021-08-31 DIAGNOSIS — M79.7 FIBROMYALGIA: ICD-10-CM

## 2021-08-31 DIAGNOSIS — I48.0 PAROXYSMAL ATRIAL FIBRILLATION: ICD-10-CM

## 2021-08-31 DIAGNOSIS — F17.200 NICOTINE DEPENDENCE, UNSPECIFIED, UNCOMPLICATED: ICD-10-CM

## 2021-08-31 DIAGNOSIS — E78.5 HYPERLIPIDEMIA, UNSPECIFIED: ICD-10-CM

## 2021-08-31 DIAGNOSIS — R09.82 POSTNASAL DRIP: ICD-10-CM

## 2021-08-31 DIAGNOSIS — J43.9 EMPHYSEMA, UNSPECIFIED: ICD-10-CM

## 2021-08-31 DIAGNOSIS — E66.01 MORBID (SEVERE) OBESITY DUE TO EXCESS CALORIES: ICD-10-CM

## 2021-08-31 DIAGNOSIS — R09.02 HYPOXEMIA: ICD-10-CM

## 2021-08-31 DIAGNOSIS — R07.9 CHEST PAIN, UNSPECIFIED: ICD-10-CM

## 2021-09-09 ENCOUNTER — APPOINTMENT (OUTPATIENT)
Dept: HEART AND VASCULAR | Facility: CLINIC | Age: 71
End: 2021-09-09

## 2021-09-22 RX ORDER — LOSARTAN POTASSIUM 100 MG/1
1 TABLET, FILM COATED ORAL
Qty: 0 | Refills: 0 | DISCHARGE

## 2021-09-22 RX ORDER — METOPROLOL TARTRATE 50 MG
1 TABLET ORAL
Qty: 0 | Refills: 0 | DISCHARGE

## 2021-09-22 RX ORDER — VENLAFAXINE HCL 75 MG
2 CAPSULE, EXT RELEASE 24 HR ORAL
Qty: 0 | Refills: 0 | DISCHARGE

## 2021-09-22 RX ORDER — FUROSEMIDE 40 MG
1 TABLET ORAL
Qty: 0 | Refills: 0 | DISCHARGE

## 2021-09-22 RX ORDER — VENLAFAXINE HCL 75 MG
1 CAPSULE, EXT RELEASE 24 HR ORAL
Qty: 0 | Refills: 0 | DISCHARGE

## 2021-09-22 RX ORDER — ATORVASTATIN CALCIUM 80 MG/1
1 TABLET, FILM COATED ORAL
Qty: 0 | Refills: 0 | DISCHARGE

## 2021-09-22 RX ORDER — ASPIRIN/CALCIUM CARB/MAGNESIUM 324 MG
1 TABLET ORAL
Qty: 0 | Refills: 0 | DISCHARGE

## 2021-10-06 ENCOUNTER — RESULT REVIEW (OUTPATIENT)
Age: 71
End: 2021-10-06

## 2021-12-01 PROCEDURE — G9005: CPT

## 2021-12-07 ENCOUNTER — RESULT REVIEW (OUTPATIENT)
Age: 71
End: 2021-12-07

## 2022-01-05 ENCOUNTER — EMERGENCY (EMERGENCY)
Facility: HOSPITAL | Age: 72
LOS: 1 days | Discharge: HOME | End: 2022-01-05
Attending: EMERGENCY MEDICINE | Admitting: EMERGENCY MEDICINE
Payer: MEDICARE

## 2022-01-05 VITALS
TEMPERATURE: 98 F | RESPIRATION RATE: 18 BRPM | SYSTOLIC BLOOD PRESSURE: 119 MMHG | HEIGHT: 62 IN | WEIGHT: 244.93 LBS | OXYGEN SATURATION: 95 % | DIASTOLIC BLOOD PRESSURE: 73 MMHG | HEART RATE: 94 BPM

## 2022-01-05 DIAGNOSIS — Z79.01 LONG TERM (CURRENT) USE OF ANTICOAGULANTS: ICD-10-CM

## 2022-01-05 DIAGNOSIS — E78.5 HYPERLIPIDEMIA, UNSPECIFIED: ICD-10-CM

## 2022-01-05 DIAGNOSIS — I10 ESSENTIAL (PRIMARY) HYPERTENSION: ICD-10-CM

## 2022-01-05 DIAGNOSIS — I48.91 UNSPECIFIED ATRIAL FIBRILLATION: ICD-10-CM

## 2022-01-05 DIAGNOSIS — I47.1 SUPRAVENTRICULAR TACHYCARDIA: ICD-10-CM

## 2022-01-05 DIAGNOSIS — I25.10 ATHEROSCLEROTIC HEART DISEASE OF NATIVE CORONARY ARTERY WITHOUT ANGINA PECTORIS: ICD-10-CM

## 2022-01-05 DIAGNOSIS — Z95.5 PRESENCE OF CORONARY ANGIOPLASTY IMPLANT AND GRAFT: ICD-10-CM

## 2022-01-05 DIAGNOSIS — E87.6 HYPOKALEMIA: ICD-10-CM

## 2022-01-05 DIAGNOSIS — R07.89 OTHER CHEST PAIN: ICD-10-CM

## 2022-01-05 DIAGNOSIS — Z79.82 LONG TERM (CURRENT) USE OF ASPIRIN: ICD-10-CM

## 2022-01-05 DIAGNOSIS — J44.9 CHRONIC OBSTRUCTIVE PULMONARY DISEASE, UNSPECIFIED: ICD-10-CM

## 2022-01-05 DIAGNOSIS — Z96.641 PRESENCE OF RIGHT ARTIFICIAL HIP JOINT: Chronic | ICD-10-CM

## 2022-01-05 DIAGNOSIS — U07.1 COVID-19: ICD-10-CM

## 2022-01-05 LAB
ANION GAP SERPL CALC-SCNC: 11 MMOL/L — SIGNIFICANT CHANGE UP (ref 9–16)
BASOPHILS # BLD AUTO: 0.05 K/UL — SIGNIFICANT CHANGE UP (ref 0–0.2)
BASOPHILS NFR BLD AUTO: 0.5 % — SIGNIFICANT CHANGE UP (ref 0–2)
BUN SERPL-MCNC: 48 MG/DL — HIGH (ref 7–23)
CALCIUM SERPL-MCNC: 9.1 MG/DL — SIGNIFICANT CHANGE UP (ref 8.5–10.5)
CHLORIDE SERPL-SCNC: 94 MMOL/L — LOW (ref 96–108)
CO2 SERPL-SCNC: 30 MMOL/L — SIGNIFICANT CHANGE UP (ref 22–31)
CREAT SERPL-MCNC: 1.2 MG/DL — SIGNIFICANT CHANGE UP (ref 0.5–1.3)
EOSINOPHIL # BLD AUTO: 0.1 K/UL — SIGNIFICANT CHANGE UP (ref 0–0.5)
EOSINOPHIL NFR BLD AUTO: 0.9 % — SIGNIFICANT CHANGE UP (ref 0–6)
GLUCOSE SERPL-MCNC: 115 MG/DL — HIGH (ref 70–99)
HCT VFR BLD CALC: 41.1 % — SIGNIFICANT CHANGE UP (ref 34.5–45)
HGB BLD-MCNC: 13.6 G/DL — SIGNIFICANT CHANGE UP (ref 11.5–15.5)
IMM GRANULOCYTES NFR BLD AUTO: 0.7 % — SIGNIFICANT CHANGE UP (ref 0–1.5)
LYMPHOCYTES # BLD AUTO: 19.1 % — SIGNIFICANT CHANGE UP (ref 13–44)
LYMPHOCYTES # BLD AUTO: 2.05 K/UL — SIGNIFICANT CHANGE UP (ref 1–3.3)
MCHC RBC-ENTMCNC: 27.9 PG — SIGNIFICANT CHANGE UP (ref 27–34)
MCHC RBC-ENTMCNC: 33.1 GM/DL — SIGNIFICANT CHANGE UP (ref 32–36)
MCV RBC AUTO: 84.4 FL — SIGNIFICANT CHANGE UP (ref 80–100)
MONOCYTES # BLD AUTO: 0.96 K/UL — HIGH (ref 0–0.9)
MONOCYTES NFR BLD AUTO: 8.9 % — SIGNIFICANT CHANGE UP (ref 2–14)
NEUTROPHILS # BLD AUTO: 7.52 K/UL — HIGH (ref 1.8–7.4)
NEUTROPHILS NFR BLD AUTO: 69.9 % — SIGNIFICANT CHANGE UP (ref 43–77)
NRBC # BLD: 0 /100 WBCS — SIGNIFICANT CHANGE UP (ref 0–0)
PLATELET # BLD AUTO: 372 K/UL — SIGNIFICANT CHANGE UP (ref 150–400)
POTASSIUM SERPL-MCNC: 2.9 MMOL/L — CRITICAL LOW (ref 3.5–5.3)
POTASSIUM SERPL-SCNC: 2.9 MMOL/L — CRITICAL LOW (ref 3.5–5.3)
RBC # BLD: 4.87 M/UL — SIGNIFICANT CHANGE UP (ref 3.8–5.2)
RBC # FLD: 14.6 % — HIGH (ref 10.3–14.5)
SODIUM SERPL-SCNC: 135 MMOL/L — SIGNIFICANT CHANGE UP (ref 132–145)
TROPONIN I, HIGH SENSITIVITY RESULT: 18.7 NG/L — SIGNIFICANT CHANGE UP
WBC # BLD: 10.76 K/UL — HIGH (ref 3.8–10.5)
WBC # FLD AUTO: 10.76 K/UL — HIGH (ref 3.8–10.5)

## 2022-01-05 PROCEDURE — 93010 ELECTROCARDIOGRAM REPORT: CPT

## 2022-01-05 PROCEDURE — 71046 X-RAY EXAM CHEST 2 VIEWS: CPT | Mod: 26

## 2022-01-05 PROCEDURE — 99285 EMERGENCY DEPT VISIT HI MDM: CPT

## 2022-01-05 NOTE — ED ADULT NURSE NOTE - OBJECTIVE STATEMENT
BIB EMS c/o chest pressure and palpitations. Pt also c/o cough- states roommate is covid +.  AS per EMS-pt had 30 second run of SVT that resolved with vasovagal. Pt denies chest pressure at this time. I will START or STAY ON the medications listed below when I get home from the hospital:    aspirin 81 mg oral delayed release tablet  -- 1 tab(s) by mouth once a day  -- Indication: For CAD    Preparation H 0.25% rectal ointment  -- 1 application rectally 2 times a day, As Needed For Hemorrhoidal Pain  -- Check with your doctor before becoming pregnant.  For rectal use only.    -- Indication: For Hemorrhoids    Zetia 10 mg oral tablet  -- 1 tab(s) by mouth once a day  -- Indication: For CAD    QUEtiapine 25 mg oral tablet  -- 1 tab(s) by mouth once a day (at bedtime)  -- Indication: For Depression    raNITIdine 150 mg oral capsule  -- 1 cap(s) by mouth 2 times a day  -- Indication: For GERD (gastroesophageal reflux disease)    docusate sodium 100 mg oral capsule  -- 1 cap(s) by mouth 3 times a day  -- Indication: For Stool Softener    MiraLax oral powder for reconstitution  -- 1 gram(s) by mouth once a day   -- Dilute this medication with liquid before administration.  It is very important that you take or use this exactly as directed.  Do not skip doses or discontinue unless directed by your doctor.    -- Indication: For Laxative    midodrine 5 mg oral tablet  -- 1 tab(s) by mouth 2 times a day  -- Indication: For Vertigo    Nuedexta 20 mg-10 mg oral capsule  -- 1 cap(s) by mouth every 12 hours  -- Indication: For Pseudobulbar Affect    Vitamin D3 2000 intl units oral capsule  -- 1 cap(s) by mouth 2 times a day  -- Indication: For Supplement I will START or STAY ON the medications listed below when I get home from the hospital:    aspirin 81 mg oral delayed release tablet  -- 1 tab(s) by mouth once a day  -- Indication: For CAD    Preparation H 0.25% rectal ointment  -- 1 application rectally 2 times a day, As Needed For Hemorrhoidal Pain  -- Check with your doctor before becoming pregnant.  For rectal use only.    -- Indication: For Hemorrhoids    Zetia 10 mg oral tablet  -- 1 tab(s) by mouth once a day  -- Indication: For CAD    QUEtiapine 25 mg oral tablet  -- 1 tab(s) by mouth once a day (at bedtime) - NEED FOR THIS MEDICATION TO BE REASSESSED BY FACILITY PSYCHIATRIST ON OR ABOUT 11/25/2017  -- Indication: For Agitation    raNITIdine 150 mg oral capsule  -- 1 cap(s) by mouth 2 times a day  -- Indication: For GERD (gastroesophageal reflux disease)    MiraLax oral powder for reconstitution  -- 1 gram(s) by mouth once a day   -- Dilute this medication with liquid before administration.  It is very important that you take or use this exactly as directed.  Do not skip doses or discontinue unless directed by your doctor.    -- Indication: For Laxative    docusate sodium 100 mg oral capsule  -- 1 cap(s) by mouth 3 times a day  -- Indication: For Stool Softener    midodrine 5 mg oral tablet  -- 1 tab(s) by mouth 2 times a day  -- Indication: For Vertigo    memantine 10 mg oral tablet  -- 1 tab(s) by mouth once a day (at bedtime)  -- Indication: For Dementia    Vitamin D3 2000 intl units oral capsule  -- 1 cap(s) by mouth 2 times a day  -- Indication: For Supplement

## 2022-01-05 NOTE — ED ADULT TRIAGE NOTE - CHIEF COMPLAINT QUOTE
BIB EMS "I started feeling chest pressure and palpitations around 9pm." AS per EMS "She had a couple runs of SVT lasting 30sec to 1min that resolved with vaso vagal." Pt. denies palpitations at this time.

## 2022-01-06 VITALS
OXYGEN SATURATION: 97 % | SYSTOLIC BLOOD PRESSURE: 118 MMHG | TEMPERATURE: 98 F | DIASTOLIC BLOOD PRESSURE: 70 MMHG | HEART RATE: 96 BPM | RESPIRATION RATE: 18 BRPM

## 2022-01-06 PROBLEM — I10 ESSENTIAL (PRIMARY) HYPERTENSION: Chronic | Status: ACTIVE | Noted: 2021-03-15

## 2022-01-06 PROBLEM — E78.00 PURE HYPERCHOLESTEROLEMIA, UNSPECIFIED: Chronic | Status: ACTIVE | Noted: 2021-03-15

## 2022-01-06 PROBLEM — I21.9 ACUTE MYOCARDIAL INFARCTION, UNSPECIFIED: Chronic | Status: ACTIVE | Noted: 2021-03-15

## 2022-01-06 LAB
D DIMER BLD IA.RAPID-MCNC: <187 NG/ML DDU — SIGNIFICANT CHANGE UP
MAGNESIUM SERPL-MCNC: 2.6 MG/DL — SIGNIFICANT CHANGE UP (ref 1.6–2.6)
SARS-COV-2 RNA SPEC QL NAA+PROBE: DETECTED
TROPONIN I, HIGH SENSITIVITY RESULT: 16.4 NG/L — SIGNIFICANT CHANGE UP

## 2022-01-06 RX ORDER — POTASSIUM CHLORIDE 20 MEQ
40 PACKET (EA) ORAL ONCE
Refills: 0 | Status: COMPLETED | OUTPATIENT
Start: 2022-01-06 | End: 2022-01-06

## 2022-01-06 RX ORDER — POTASSIUM CHLORIDE 20 MEQ
10 PACKET (EA) ORAL ONCE
Refills: 0 | Status: COMPLETED | OUTPATIENT
Start: 2022-01-06 | End: 2022-01-06

## 2022-01-06 RX ADMIN — Medication 40 MILLIEQUIVALENT(S): at 00:57

## 2022-01-06 RX ADMIN — Medication 100 MILLIEQUIVALENT(S): at 00:57

## 2022-01-06 RX ADMIN — Medication 0.5 MILLIGRAM(S): at 02:50

## 2022-01-06 NOTE — ED PROVIDER NOTE - PATIENT PORTAL LINK FT
You can access the FollowMyHealth Patient Portal offered by [facility] by registering at the following website: [email or web address]. By joining [employer]’s FollowMyHealth portal, you will also be able to view your health information using other applications (apps) compatible with our system.

## 2022-01-06 NOTE — ED PROVIDER NOTE - OBJECTIVE STATEMENT
71-year-old woman with PMH of CAD s/p stent to LAD in March 2021 on Plavix, HTN, HLD, COPD, fibromyalgia, atrial fibrillation on Xarelto, depression, who is brought to the ED after experiencing chest pressure, breathlessness, and palpitations starting around 9pm. Chest pressure resolved within about 10 minutes of onset. EMS reported a few runs of SVT that resolved with Valsalva maneuvers. She is no longer having chest pressure or palpitations but does report breathlessness and says that she has had "a cold" for about 10 days, was on antibiotics from her PCP, and that is aggravating her baseline breathing problems; her roommate has COVID, apparently. She was given 324mg ASA by EMS.

## 2022-01-06 NOTE — ED PROVIDER NOTE - CLINICAL SUMMARY MEDICAL DECISION MAKING FREE TEXT BOX
Differential diagnosis includes angina, ACS, SVT, afib with RVR, less likely VT. Considered also for pneumonia, pneumothorax, COVID, and possible PE. Do not suspect aortic dissection. VS normal. EKG without acute ischemic changes. Received ASA from EMS. Moderate risk for ACS by HEART score. Differential diagnosis includes angina, ACS, SVT, afib with RVR, less likely VT. Considered also for pneumonia, pneumothorax, COVID, and possible PE. Do not suspect aortic dissection. VS normal. EKG without acute ischemic changes. no respiratory distress though has diffuse wheezes. known covid exposure. Received ASA from EMS. Moderate risk for ACS by HEART score; will check serial troponins along with other labs and chest xr.

## 2022-01-06 NOTE — ED PROVIDER NOTE - CARE PLAN
1 Principal Discharge DX:	2019 novel coronavirus disease (COVID-19)  Secondary Diagnosis:	Paroxysmal SVT (supraventricular tachycardia)

## 2022-01-06 NOTE — ED PROVIDER NOTE - PROGRESS NOTE DETAILS
repeat troponin stable and within normal limits. patient feeling anxious but not having any chest pain/pressure or palpitations. repeat troponin stable and within normal limits. patient feeling anxious but not having any chest pain/pressure or palpitations. she reports longstanding issues with hypokalemia due to her medications and is compliant with potassium supplementation but continues to have issues.

## 2022-01-06 NOTE — ED PROVIDER NOTE - NSFOLLOWUPINSTRUCTIONS_ED_ALL_ED_FT
COVID-19 (Coronavirus Disease 2019)    WHAT YOU NEED TO KNOW:    COVID-19 is the disease caused by a coronavirus first discovered in December 2019. Coronaviruses generally cause upper respiratory (nose, throat, and lung) infections, such as a cold. The 2019 virus spreads quickly and easily. It can be spread starting 2 to 3 days before symptoms even begin.    DISCHARGE INSTRUCTIONS:    Call your local emergency number (911 in the ) if:   •You have trouble breathing or shortness of breath at rest.      •You have chest pain or pressure that lasts longer than 5 minutes.      •You become confused or hard to wake.      •Your lips or face are blue.      Seek care immediately if:   •You have a fever of 104°F (40°C) or higher.          Call your doctor if:   •You have symptoms of COVID-19.      •You have questions or concerns about your condition or care.      Medicines: You may need any of the following:   •Decongestants help reduce nasal congestion and help you breathe more easily. If you take decongestant pills, they may make you feel restless or cause problems with your sleep. Do not use decongestant sprays for more than a few days.      •Cough suppressants help reduce coughing. Ask your healthcare provider which type of cough medicine is best for you.      •Acetaminophen decreases pain and fever. It is available without a doctor's order. Ask how much to take and how often to take it. Follow directions. Read the labels of all other medicines you are using to see if they also contain acetaminophen, or ask your doctor or pharmacist. Acetaminophen can cause liver damage if not taken correctly. Do not use more than 4 grams (4,000 milligrams) total of acetaminophen in one day.       •NSAIDs, such as ibuprofen, help decrease swelling, pain, and fever. This medicine is available with or without a doctor's order. NSAIDs can cause stomach bleeding or kidney problems in certain people. If you take blood thinner medicine, always ask your healthcare provider if NSAIDs are safe for you. Always read the medicine label and follow directions.      •Blood thinners help prevent blood clots. Clots can cause strokes, heart attacks, and death. The following are general safety guidelines to follow while you are taking a blood thinner:?Watch for bleeding and bruising while you take blood thinners. Watch for bleeding from your gums or nose. Watch for blood in your urine and bowel movements. Use a soft washcloth on your skin, and a soft toothbrush to brush your teeth. This can keep your skin and gums from bleeding. If you shave, use an electric shaver. Do not play contact sports.       ?Tell your dentist and other healthcare providers that you take a blood thinner. Wear a bracelet or necklace that says you take this medicine.       ?Do not start or stop any other medicines unless your healthcare provider tells you to. Many medicines cannot be used with blood thinners.      ?Take your blood thinner exactly as prescribed by your healthcare provider. Do not skip does or take less than prescribed. Tell your provider right away if you forget to take your blood thinner, or if you take too much.      ?Warfarin is a blood thinner that you may need to take. The following are things you should be aware of if you take warfarin: ?Foods and medicines can affect the amount of warfarin in your blood. Do not make major changes to your diet while you take warfarin. Warfarin works best when you eat about the same amount of vitamin K every day. Vitamin K is found in green leafy vegetables and certain other foods. Ask for more information about what to eat when you are taking warfarin.      ?You will need to see your healthcare provider for follow-up visits when you are on warfarin. You will need regular blood tests. These tests are used to decide how much medicine you need.         •Take your medicine as directed. Contact your healthcare provider if you think your medicine is not helping or if you have side effects. Tell him or her if you are allergic to any medicine. Keep a list of the medicines, vitamins, and herbs you take. Include the amounts, and when and why you take them. Bring the list or the pill bottles to follow-up visits. Carry your medicine list with you in case of an emergency.      What you need to know about variants: The virus has changed into several new forms (called variants) since it was discovered. The variants may be more contagious (easily spread) than the original form. Some may also cause more severe illness than others.    What you need to know about COVID-19 vaccines: Healthcare providers recommend a COVID-19 vaccine, even if you have already had COVID-19. You are considered fully vaccinated against COVID-19 two weeks after the final dose of any COVID-19 vaccine. Let your healthcare provider know when you have received the final dose of the vaccine. Make a copy of your vaccination card. Keep the original with you in case you need to show it. Keep the copy in a safe place.  •COVID-19 vaccines are given as a shot in 1 or 2 doses.   Vaccination is recommended for everyone 5 years or older. One 2-dose vaccine is fully approved for those 16 or older. This vaccine also has an emergency use authorization (EUA) for children 5 to 15 years old. No vaccine is currently available for children younger than 5 years. An additional (booster) dose is also recommended for all adults 18 or older. The booster can be a different brand of the COVID-19 vaccine than you originally received. The timing for the booster depends on the type of vaccine you received:?1-dose vaccine: The booster is given at least 2 months after you received the vaccine.      ?2-dose vaccine: The booster is given at least 6 months after the second dose.    COVID-19 Immunization Schedule         •Continue social distancing and other measures, even after you get the vaccine. Although it is not common, you can become infected after you get the vaccine. You may also be able to pass the virus to others without knowing you are infected.      •After you get the vaccine, check local, national, and international travel rules. You may need to be tested before you travel. Some countries require proof of a negative test before you travel. You may also need to quarantine after you return.      How the 2019 coronavirus spreads:   •Droplets are the main way all coronaviruses spread. The virus travels in droplets that form when a person talks, sings, coughs, or sneezes. The droplets can also float in the air for minutes or hours. Infection happens when you breathe in the droplets or get them in your eyes or nose. Close personal contact with an infected person increases your risk for infection. This means being within 6 feet (2 meters) of the person for at least 15 minutes over 24 hours.      •Person-to-person contact can spread the virus. For example, a person with the virus on his or her hands can spread it by shaking hands with someone.      •The virus can stay on objects and surfaces for up to 3 days. You may become infected by touching the object or surface and then touching your eyes or mouth.      Help lower the risk for COVID-19:   •Wash your hands often throughout the day. Use soap and water. Rub your soapy hands together, lacing your fingers, for at least 20 seconds. Rinse with warm, running water. Dry your hands with a clean towel or paper towel. Use hand  that contains alcohol if soap and water are not available. Teach children how to wash their hands and use hand .  Handwashing           •Cover sneezes and coughs. Turn your face away and cover your mouth and nose with a tissue. Throw the tissue away. Use the bend of your arm if a tissue is not available. Then wash your hands well with soap and water or use hand . Teach children how to cover a cough or sneeze.      •Wear a face covering (mask) when needed. Use a cloth covering with at least 2 layers. You can also create layers by putting a cloth covering over a disposable non-medical mask. Cover your mouth and your nose.  How to Wear a Face Covering (Mask)           •Follow worldwide, national, and local social distancing guidelines. Keep at least 6 feet (2 meters) between you and others.      •Try not to touch your face. If you get the virus on your hands, you can transfer it to your eyes, nose, or mouth and become infected. You can also transfer it to objects, surfaces, or people.      •Clean and disinfect high-touch surfaces and objects often. Use disinfecting wipes, or make a solution of 4 teaspoons of bleach in 1 quart (4 cups) of water.      •Ask about other vaccines you may need. Get the influenza (flu) vaccine as soon as recommended each year, usually starting in September or October. Get the pneumonia vaccine if recommended. Your healthcare provider can tell you if you should also get other vaccines, and when to get them.    Prevent COVID-19 Infection         Follow social distancing guidelines: National and local social distancing rules vary. Rules and restrictions may change over time as restrictions are lifted. The following are general guidelines:   •Stay home if you are sick or think you may have COVID-19. It is important to stay home if you are waiting for a testing appointment or for test results.      •Avoid close physical contact with anyone who does not live in your home. Do not shake hands with, hug, or kiss a person as a greeting. If you must use public transportation (such as a bus or subway), try to sit or stand away from others. Wear your face covering.      •Avoid in-person gatherings and crowds. Attend virtually if possible.      Follow up with your doctor as directed: Write down your questions so you remember to ask them during your visits.    For more information:   •Centers for Disease Control and Prevention  [address] [place] [address]  Phone: 1-503.354.1893  Web Address: http://www.cdc.gov

## 2022-01-06 NOTE — ED PROVIDER NOTE - PHYSICAL EXAMINATION
Constitutional: awake and alert, in no acute distress  HEENT: head normocephalic and atraumatic. moist mucous membranes  Eyes: extraocular movements intact, normal conjunctiva  Neck: supple, normal ROM  Cardiovascular: regular rate and rhythm, no murmur  Pulmonary: no respiratory distress. diffuse expiratory wheezes. speaks in full sentences  Gastrointestinal: abdomen soft, nontender, nondistended, no rebound or guarding  Skin: warm, dry, normal for ethnicity  Musculoskeletal: no edema, no deformity  Neurological: oriented x4, no focal neurologic deficit  Psychiatric: appropriate mood and affect

## 2022-01-23 ENCOUNTER — EMERGENCY (EMERGENCY)
Facility: HOSPITAL | Age: 72
LOS: 1 days | Discharge: HOME | End: 2022-01-23
Admitting: EMERGENCY MEDICINE
Payer: MEDICARE

## 2022-01-23 VITALS
DIASTOLIC BLOOD PRESSURE: 84 MMHG | HEIGHT: 62 IN | HEART RATE: 104 BPM | RESPIRATION RATE: 18 BRPM | OXYGEN SATURATION: 97 % | WEIGHT: 240.08 LBS | TEMPERATURE: 98 F | SYSTOLIC BLOOD PRESSURE: 119 MMHG

## 2022-01-23 VITALS
OXYGEN SATURATION: 94 % | HEART RATE: 98 BPM | SYSTOLIC BLOOD PRESSURE: 131 MMHG | RESPIRATION RATE: 18 BRPM | DIASTOLIC BLOOD PRESSURE: 80 MMHG

## 2022-01-23 DIAGNOSIS — I25.10 ATHEROSCLEROTIC HEART DISEASE OF NATIVE CORONARY ARTERY WITHOUT ANGINA PECTORIS: ICD-10-CM

## 2022-01-23 DIAGNOSIS — R42 DIZZINESS AND GIDDINESS: ICD-10-CM

## 2022-01-23 DIAGNOSIS — B19.20 UNSPECIFIED VIRAL HEPATITIS C WITHOUT HEPATIC COMA: ICD-10-CM

## 2022-01-23 DIAGNOSIS — F17.200 NICOTINE DEPENDENCE, UNSPECIFIED, UNCOMPLICATED: ICD-10-CM

## 2022-01-23 DIAGNOSIS — I50.9 HEART FAILURE, UNSPECIFIED: ICD-10-CM

## 2022-01-23 DIAGNOSIS — Z96.641 PRESENCE OF RIGHT ARTIFICIAL HIP JOINT: Chronic | ICD-10-CM

## 2022-01-23 DIAGNOSIS — I48.91 UNSPECIFIED ATRIAL FIBRILLATION: ICD-10-CM

## 2022-01-23 DIAGNOSIS — I11.0 HYPERTENSIVE HEART DISEASE WITH HEART FAILURE: ICD-10-CM

## 2022-01-23 DIAGNOSIS — Z20.822 CONTACT WITH AND (SUSPECTED) EXPOSURE TO COVID-19: ICD-10-CM

## 2022-01-23 LAB
ALBUMIN SERPL ELPH-MCNC: 3.4 G/DL — SIGNIFICANT CHANGE UP (ref 3.4–5)
ALP SERPL-CCNC: 152 U/L — HIGH (ref 40–120)
ALT FLD-CCNC: 28 U/L — SIGNIFICANT CHANGE UP (ref 12–42)
ANION GAP SERPL CALC-SCNC: 6 MMOL/L — LOW (ref 9–16)
APTT BLD: 34 SEC — SIGNIFICANT CHANGE UP (ref 27.5–35.5)
AST SERPL-CCNC: 21 U/L — SIGNIFICANT CHANGE UP (ref 15–37)
BASOPHILS # BLD AUTO: 0.05 K/UL — SIGNIFICANT CHANGE UP (ref 0–0.2)
BASOPHILS NFR BLD AUTO: 0.6 % — SIGNIFICANT CHANGE UP (ref 0–2)
BILIRUB SERPL-MCNC: 0.2 MG/DL — SIGNIFICANT CHANGE UP (ref 0.2–1.2)
BUN SERPL-MCNC: 33 MG/DL — HIGH (ref 7–23)
CALCIUM SERPL-MCNC: 9.4 MG/DL — SIGNIFICANT CHANGE UP (ref 8.5–10.5)
CHLORIDE SERPL-SCNC: 100 MMOL/L — SIGNIFICANT CHANGE UP (ref 96–108)
CO2 SERPL-SCNC: 29 MMOL/L — SIGNIFICANT CHANGE UP (ref 22–31)
CREAT SERPL-MCNC: 1.08 MG/DL — SIGNIFICANT CHANGE UP (ref 0.5–1.3)
EOSINOPHIL # BLD AUTO: 0.14 K/UL — SIGNIFICANT CHANGE UP (ref 0–0.5)
EOSINOPHIL NFR BLD AUTO: 1.5 % — SIGNIFICANT CHANGE UP (ref 0–6)
GLUCOSE SERPL-MCNC: 116 MG/DL — HIGH (ref 70–99)
HCT VFR BLD CALC: 40.2 % — SIGNIFICANT CHANGE UP (ref 34.5–45)
HGB BLD-MCNC: 12.9 G/DL — SIGNIFICANT CHANGE UP (ref 11.5–15.5)
IMM GRANULOCYTES NFR BLD AUTO: 0.7 % — SIGNIFICANT CHANGE UP (ref 0–1.5)
INR BLD: 1.16 — SIGNIFICANT CHANGE UP (ref 0.88–1.16)
LYMPHOCYTES # BLD AUTO: 1.65 K/UL — SIGNIFICANT CHANGE UP (ref 1–3.3)
LYMPHOCYTES # BLD AUTO: 18.2 % — SIGNIFICANT CHANGE UP (ref 13–44)
MAGNESIUM SERPL-MCNC: 2.4 MG/DL — SIGNIFICANT CHANGE UP (ref 1.6–2.6)
MCHC RBC-ENTMCNC: 27.8 PG — SIGNIFICANT CHANGE UP (ref 27–34)
MCHC RBC-ENTMCNC: 32.1 GM/DL — SIGNIFICANT CHANGE UP (ref 32–36)
MCV RBC AUTO: 86.6 FL — SIGNIFICANT CHANGE UP (ref 80–100)
MONOCYTES # BLD AUTO: 0.87 K/UL — SIGNIFICANT CHANGE UP (ref 0–0.9)
MONOCYTES NFR BLD AUTO: 9.6 % — SIGNIFICANT CHANGE UP (ref 2–14)
NEUTROPHILS # BLD AUTO: 6.31 K/UL — SIGNIFICANT CHANGE UP (ref 1.8–7.4)
NEUTROPHILS NFR BLD AUTO: 69.4 % — SIGNIFICANT CHANGE UP (ref 43–77)
NRBC # BLD: 0 /100 WBCS — SIGNIFICANT CHANGE UP (ref 0–0)
NT-PROBNP SERPL-SCNC: 200 PG/ML — SIGNIFICANT CHANGE UP
PLATELET # BLD AUTO: 299 K/UL — SIGNIFICANT CHANGE UP (ref 150–400)
POTASSIUM SERPL-MCNC: 3.8 MMOL/L — SIGNIFICANT CHANGE UP (ref 3.5–5.3)
POTASSIUM SERPL-SCNC: 3.8 MMOL/L — SIGNIFICANT CHANGE UP (ref 3.5–5.3)
PROT SERPL-MCNC: 7.4 G/DL — SIGNIFICANT CHANGE UP (ref 6.4–8.2)
PROTHROM AB SERPL-ACNC: 13.6 SEC — SIGNIFICANT CHANGE UP (ref 10.6–13.6)
RBC # BLD: 4.64 M/UL — SIGNIFICANT CHANGE UP (ref 3.8–5.2)
RBC # FLD: 15.3 % — HIGH (ref 10.3–14.5)
SARS-COV-2 RNA SPEC QL NAA+PROBE: SIGNIFICANT CHANGE UP
SODIUM SERPL-SCNC: 135 MMOL/L — SIGNIFICANT CHANGE UP (ref 132–145)
TROPONIN I, HIGH SENSITIVITY RESULT: 18.8 NG/L — SIGNIFICANT CHANGE UP
WBC # BLD: 9.08 K/UL — SIGNIFICANT CHANGE UP (ref 3.8–10.5)
WBC # FLD AUTO: 9.08 K/UL — SIGNIFICANT CHANGE UP (ref 3.8–10.5)

## 2022-01-23 PROCEDURE — 71045 X-RAY EXAM CHEST 1 VIEW: CPT | Mod: 26

## 2022-01-23 PROCEDURE — 99285 EMERGENCY DEPT VISIT HI MDM: CPT | Mod: 25

## 2022-01-23 PROCEDURE — 93010 ELECTROCARDIOGRAM REPORT: CPT

## 2022-01-23 RX ORDER — SODIUM CHLORIDE 9 MG/ML
1000 INJECTION INTRAMUSCULAR; INTRAVENOUS; SUBCUTANEOUS ONCE
Refills: 0 | Status: COMPLETED | OUTPATIENT
Start: 2022-01-23 | End: 2022-01-23

## 2022-01-23 RX ORDER — ACETAMINOPHEN 500 MG
650 TABLET ORAL ONCE
Refills: 0 | Status: COMPLETED | OUTPATIENT
Start: 2022-01-23 | End: 2022-01-23

## 2022-01-23 RX ADMIN — SODIUM CHLORIDE 1000 MILLILITER(S): 9 INJECTION INTRAMUSCULAR; INTRAVENOUS; SUBCUTANEOUS at 09:14

## 2022-01-23 RX ADMIN — Medication 650 MILLIGRAM(S): at 10:14

## 2022-01-23 NOTE — ED ADULT TRIAGE NOTE - CHIEF COMPLAINT QUOTE
pt with known afib on anticoagulants called 911 for dizziness, per ems pt was in rapid afib on arrival arrives with hr 104 in no distress

## 2022-01-23 NOTE — ED ADULT NURSE NOTE - OBJECTIVE STATEMENT
pt c/o heart palpitations. States she drank a little wine last night. known afib,  HR, no distress. 94% on RA.

## 2022-01-23 NOTE — ED ADULT NURSE NOTE - NSFALLRSKINDICATORS_ED_ALL_ED
Problem: Patient Care Overview  Goal: Plan of Care Review  Outcome: Ongoing (interventions implemented as appropriate)  Patient on documented O2, no respiratory distress noted. Will continue to monitor.           no

## 2022-01-23 NOTE — ED PROVIDER NOTE - NSFOLLOWUPINSTRUCTIONS_ED_ALL_ED_FT
Atrial Fibrillation    Atrial fibrillation is a type of irregular heartbeat (arrhythmia) where the heart quivers continuously in a chaotic pattern that makes the heart unable to pump blood normally. This can increase the risk for stroke, heart failure, and other heart-related conditions. Atrial fibrillation can be caused by a variety of conditions and may be temporary, intermittent, or permanent. Symptoms include feeling that your heart is beating rapidly or irregularly, chest discomfort, shortness of breath, or dizziness/lightheadedness that may be worse with exertion. Treatment is varied but may involve medication or electrical shock (cardioversion).    SEEK IMMEDIATE MEDICAL CARE IF YOU HAVE ANY OF THE FOLLOWING SYMPTOMS: chest pain, shortness of breath, abdominal pain, sweating, vomiting, blood in vomit/bowel movements/urine, dizziness/lightheadedness, weakness or numbness to face/arm/leg, trouble speaking or understanding, facial droop.     FOLLOW UP WITH YOUR CARDIOLOGIST.  CALL FOR A FOLLOW UP APPOINTMENT.

## 2022-01-23 NOTE — ED ADULT TRIAGE NOTE - BP NONINVASIVE DIASTOLIC (MM HG)
"Chief Complaint   Patient presents with     Prenatal Care     baby active and moving - some increased back pain since yesterday     [id number]    Initial /80 (BP Location: Left arm, Patient Position: Chair, Cuff Size: Adult Regular)  Pulse 92  Wt 178 lb (80.7 kg)  LMP 07/27/2017  BMI 27.88 kg/m2 Estimated body mass index is 27.88 kg/(m^2) as calculated from the following:    Height as of 4/27/18: 5' 7\" (1.702 m).    Weight as of this encounter: 178 lb (80.7 kg).  Medication Reconciliation: complete     Nurse assisted visit.  Deysi Garcia MA.      "
84

## 2022-01-23 NOTE — ED PROVIDER NOTE - PATIENT PORTAL LINK FT
You can access the FollowMyHealth Patient Portal offered by [facility] by registering at the following website: [email or web address]. By joining [employer]’s FollowMyHealth portal, you will also be able to view your health information using other applications (apps) compatible with our system.

## 2022-01-23 NOTE — ED PROVIDER NOTE - NSICDXPASTMEDICALHX_GEN_ALL_CORE_FT
PAST MEDICAL HISTORY:  Colon polyp     Coronary artery disease     Fibromyalgia     Hepatitis C     Herniated disc, cervical     High cholesterol     HLD (hyperlipidemia)     HTN (hypertension)     HTN (hypertension)     Myocardial infarction 2001 at [facility] - reports LAD    Myocardial infarction, unspecified MI type, unspecified artery

## 2022-01-23 NOTE — ED PROVIDER NOTE - CHPI ED SYMPTOMS NEG
no headache/no back pain/no cough/no fever/no nausea/no syncope/no vomiting/no chills/no diaphoresis

## 2022-01-23 NOTE — ED PROVIDER NOTE - OBJECTIVE STATEMENT
72 yo F w/ PMHx of atrial fibrillation, CHF, CAD with stent placed on metoprolol, losartan, atorvastatin, plavix, xarelto, torsemide, potassium, venlafaxine, pantoprazole, aldactone, NKDA BIBEMS presents to the ED s/p episode of palpitations with associated chest discomfort and mild SOB this morning. Pt states she woke up from sleep with chest discomfort and SOB; when she checked her heartrate, Pt noted her heartbeat was racing, was in Afib and called EMS. Pt took her morning medication prior to EMS arrival. On EMS arrival, Pt was noted to be in rapid Afib, but Pt then converted without intervention. Pt was brought to the ED for evaluation. Pt reports previous episodes of atrial fibrillation and notes today's episode was milder than previous episodes. Pt denies diaphoresis, fever, chills, cough, back/abdominal pain, N/V, syncope, headache.  smok dr cody wood cards

## 2022-01-30 ENCOUNTER — EMERGENCY (EMERGENCY)
Facility: HOSPITAL | Age: 72
LOS: 1 days | Discharge: HOME | End: 2022-01-30
Admitting: EMERGENCY MEDICINE
Payer: MEDICARE

## 2022-01-30 VITALS
SYSTOLIC BLOOD PRESSURE: 126 MMHG | RESPIRATION RATE: 18 BRPM | OXYGEN SATURATION: 95 % | HEART RATE: 93 BPM | DIASTOLIC BLOOD PRESSURE: 72 MMHG | TEMPERATURE: 98 F

## 2022-01-30 VITALS
TEMPERATURE: 98 F | WEIGHT: 248.02 LBS | OXYGEN SATURATION: 95 % | HEIGHT: 62 IN | RESPIRATION RATE: 18 BRPM | DIASTOLIC BLOOD PRESSURE: 75 MMHG | SYSTOLIC BLOOD PRESSURE: 121 MMHG | HEART RATE: 163 BPM

## 2022-01-30 DIAGNOSIS — Z79.01 LONG TERM (CURRENT) USE OF ANTICOAGULANTS: ICD-10-CM

## 2022-01-30 DIAGNOSIS — E78.5 HYPERLIPIDEMIA, UNSPECIFIED: ICD-10-CM

## 2022-01-30 DIAGNOSIS — Z96.641 PRESENCE OF RIGHT ARTIFICIAL HIP JOINT: Chronic | ICD-10-CM

## 2022-01-30 DIAGNOSIS — I25.2 OLD MYOCARDIAL INFARCTION: ICD-10-CM

## 2022-01-30 DIAGNOSIS — R00.2 PALPITATIONS: ICD-10-CM

## 2022-01-30 DIAGNOSIS — I50.9 HEART FAILURE, UNSPECIFIED: ICD-10-CM

## 2022-01-30 DIAGNOSIS — Z79.82 LONG TERM (CURRENT) USE OF ASPIRIN: ICD-10-CM

## 2022-01-30 DIAGNOSIS — F17.210 NICOTINE DEPENDENCE, CIGARETTES, UNCOMPLICATED: ICD-10-CM

## 2022-01-30 DIAGNOSIS — I25.10 ATHEROSCLEROTIC HEART DISEASE OF NATIVE CORONARY ARTERY WITHOUT ANGINA PECTORIS: ICD-10-CM

## 2022-01-30 DIAGNOSIS — I48.91 UNSPECIFIED ATRIAL FIBRILLATION: ICD-10-CM

## 2022-01-30 LAB
ALBUMIN SERPL ELPH-MCNC: 3.4 G/DL — SIGNIFICANT CHANGE UP (ref 3.4–5)
ALP SERPL-CCNC: 150 U/L — HIGH (ref 40–120)
ALT FLD-CCNC: 37 U/L — SIGNIFICANT CHANGE UP (ref 12–42)
ANION GAP SERPL CALC-SCNC: 6 MMOL/L — LOW (ref 9–16)
AST SERPL-CCNC: 23 U/L — SIGNIFICANT CHANGE UP (ref 15–37)
BASOPHILS # BLD AUTO: 0.04 K/UL — SIGNIFICANT CHANGE UP (ref 0–0.2)
BASOPHILS NFR BLD AUTO: 0.4 % — SIGNIFICANT CHANGE UP (ref 0–2)
BILIRUB SERPL-MCNC: 0.1 MG/DL — LOW (ref 0.2–1.2)
BUN SERPL-MCNC: 42 MG/DL — HIGH (ref 7–23)
CALCIUM SERPL-MCNC: 9 MG/DL — SIGNIFICANT CHANGE UP (ref 8.5–10.5)
CHLORIDE SERPL-SCNC: 100 MMOL/L — SIGNIFICANT CHANGE UP (ref 96–108)
CO2 SERPL-SCNC: 29 MMOL/L — SIGNIFICANT CHANGE UP (ref 22–31)
CREAT SERPL-MCNC: 0.98 MG/DL — SIGNIFICANT CHANGE UP (ref 0.5–1.3)
EOSINOPHIL # BLD AUTO: 0.13 K/UL — SIGNIFICANT CHANGE UP (ref 0–0.5)
EOSINOPHIL NFR BLD AUTO: 1.4 % — SIGNIFICANT CHANGE UP (ref 0–6)
GLUCOSE SERPL-MCNC: 113 MG/DL — HIGH (ref 70–99)
HCT VFR BLD CALC: 40 % — SIGNIFICANT CHANGE UP (ref 34.5–45)
HGB BLD-MCNC: 12.7 G/DL — SIGNIFICANT CHANGE UP (ref 11.5–15.5)
IMM GRANULOCYTES NFR BLD AUTO: 0.5 % — SIGNIFICANT CHANGE UP (ref 0–1.5)
LYMPHOCYTES # BLD AUTO: 2.09 K/UL — SIGNIFICANT CHANGE UP (ref 1–3.3)
LYMPHOCYTES # BLD AUTO: 22.6 % — SIGNIFICANT CHANGE UP (ref 13–44)
MAGNESIUM SERPL-MCNC: 1.8 MG/DL — SIGNIFICANT CHANGE UP (ref 1.6–2.6)
MCHC RBC-ENTMCNC: 27.4 PG — SIGNIFICANT CHANGE UP (ref 27–34)
MCHC RBC-ENTMCNC: 31.8 GM/DL — LOW (ref 32–36)
MCV RBC AUTO: 86.4 FL — SIGNIFICANT CHANGE UP (ref 80–100)
MONOCYTES # BLD AUTO: 0.78 K/UL — SIGNIFICANT CHANGE UP (ref 0–0.9)
MONOCYTES NFR BLD AUTO: 8.4 % — SIGNIFICANT CHANGE UP (ref 2–14)
NEUTROPHILS # BLD AUTO: 6.16 K/UL — SIGNIFICANT CHANGE UP (ref 1.8–7.4)
NEUTROPHILS NFR BLD AUTO: 66.7 % — SIGNIFICANT CHANGE UP (ref 43–77)
NRBC # BLD: 0 /100 WBCS — SIGNIFICANT CHANGE UP (ref 0–0)
NT-PROBNP SERPL-SCNC: 204 PG/ML — SIGNIFICANT CHANGE UP
PLATELET # BLD AUTO: 308 K/UL — SIGNIFICANT CHANGE UP (ref 150–400)
POTASSIUM SERPL-MCNC: 3.6 MMOL/L — SIGNIFICANT CHANGE UP (ref 3.5–5.3)
POTASSIUM SERPL-SCNC: 3.6 MMOL/L — SIGNIFICANT CHANGE UP (ref 3.5–5.3)
PROT SERPL-MCNC: 7.4 G/DL — SIGNIFICANT CHANGE UP (ref 6.4–8.2)
RBC # BLD: 4.63 M/UL — SIGNIFICANT CHANGE UP (ref 3.8–5.2)
RBC # FLD: 14.9 % — HIGH (ref 10.3–14.5)
SODIUM SERPL-SCNC: 135 MMOL/L — SIGNIFICANT CHANGE UP (ref 132–145)
TROPONIN I, HIGH SENSITIVITY RESULT: 18.2 NG/L — SIGNIFICANT CHANGE UP
WBC # BLD: 9.25 K/UL — SIGNIFICANT CHANGE UP (ref 3.8–10.5)
WBC # FLD AUTO: 9.25 K/UL — SIGNIFICANT CHANGE UP (ref 3.8–10.5)

## 2022-01-30 PROCEDURE — 93010 ELECTROCARDIOGRAM REPORT: CPT

## 2022-01-30 PROCEDURE — 99285 EMERGENCY DEPT VISIT HI MDM: CPT

## 2022-01-30 NOTE — ED ADULT TRIAGE NOTE - CHIEF COMPLAINT QUOTE
Pt BIBA from home with c/o palpitations. Pt with known hx of Afib presenting with irregular HR in 160s. Took 100mg metoprolol 1hr PTA without relief. Pt well appearing. Calm and conversant. [facility] called on arrival

## 2022-01-30 NOTE — ED ADULT NURSE NOTE - CAS EDP DISCH TYPE
Medication:   Requested Prescriptions     Pending Prescriptions Disp Refills    esomeprazole ([address]) 40 MG delayed release capsule [Pharmacy Med Name: Esomeprazole Magnesium Oral Capsule Delayed Release 40 MG] 90 capsule 0     Sig: TAKE 1 CAPSULE BY MOUTH IN THE MORNING before breakfast    atorvastatin (LIPITOR) 40 MG tablet [Pharmacy Med Name: Atorvastatin Calcium Oral Tablet 40 MG] 30 tablet 0     Sig: TAKE 1 TABLET BY MOUTH ONE TIME A DAY    meloxicam (MOBIC) 15 MG tablet [Pharmacy Med Name: Meloxicam Oral Tablet 15 MG] 30 tablet 0     Sig: TAKE 1 TABLET BY MOUTH ONE TIME A DAY        Last Filled:    4/27/2020 90 caps 0 refills   4/1/2020 30 tabs 3 refills  4/1/2020 30 tabs 3 refills     Patient Phone Number: [phone] (home)     Last appt: 7/10/2020  Next appt: Visit date not found    Last OARRS:   RX Monitoring 4/25/2019   Attestation The Prescription Monitoring Report for this patient was reviewed today.
Home

## 2022-01-30 NOTE — ED ADULT NURSE NOTE - NSICDXPASTMEDICALHX_GEN_ALL_CORE_FT
PAST MEDICAL HISTORY:  Colon polyp     Coronary artery disease     Fibromyalgia     Hepatitis C     Herniated disc, cervical     High cholesterol     HLD (hyperlipidemia)     HTN (hypertension)     HTN (hypertension)     Myocardial infarction 2001 at [facility] - reports LAD    Myocardial infarction, unspecified MI type, unspecified artery

## 2022-01-30 NOTE — ED PROVIDER NOTE - NS ED MD EKG INTERPRETATION 1
Currently patient is on MS Contin 30 mg q 12, oxycodone 20 mg Q 4, Dilaudid 1 mg IV breakthrough, topical lidocaine, Neurontin 100 mg t i d , Tylenol 975 mg ATC per palliative Care recommendations  Patient is still complaining of uncontrolled pain  Have discussed with palliative care to adjust pain regimen  normal sinus rhythm

## 2022-01-30 NOTE — ED ADULT NURSE NOTE - NSIMPLEMENTINTERV_GEN_ALL_ED
Implemented All Fall Risk Interventions:  [place] to call system. Call bell, personal items and telephone within reach. Instruct patient to call for assistance. Room bathroom lighting operational. Non-slip footwear when patient is off stretcher. Physically safe environment: no spills, clutter or unnecessary equipment. Stretcher in lowest position, wheels locked, appropriate side rails in place. Provide visual cue, wrist band, yellow gown, etc. Monitor gait and stability. Monitor for mental status changes and reorient to person, place, and time. Review medications for side effects contributing to fall risk. Reinforce activity limits and safety measures with patient and family.

## 2022-01-30 NOTE — ED PROVIDER NOTE - OBJECTIVE STATEMENT
72 yo F w/ PMHx of atrial fibrillation, CHF, CAD with stent placed on metoprolol, losartan, atorvastatin, Plavix, Xarelto, torsemide, potassium, venlafaxine, pantoprazole, aldactone, active smoker, NKDA BIBEMS presents to the ED s/p episode of palpitations this morning. Pt noted her heartbeat was racing, was in Afib to the 160s and called EMS. Pt took 100mg of metoprolol 1 hour prior to ED arrival. Pt reports previous episodes of atrial fibrillation and notes today's episode was milder than previous episodes. Pt denies diaphoresis, fever, chills, cough, SOB, chest/back/abdominal pain, N/V, syncope, headache.  Pt has cardiology follow up with Dr Goyal at [facility] cardiology

## 2022-01-30 NOTE — ED PROVIDER NOTE - NSICDXPASTMEDICALHX_GEN_ALL_CORE_FT
PAST MEDICAL HISTORY:  Colon polyp     Coronary artery disease     Fibromyalgia     Hepatitis C     Herniated disc, cervical     High cholesterol     HLD (hyperlipidemia)     HTN (hypertension)     HTN (hypertension)     Myocardial infarction 2001 at [facility] - reports LAD    Myocardial infarction, unspecified MI type, unspecified artery

## 2022-01-30 NOTE — ED ADULT NURSE NOTE - BRAND OF COVID-19 VACCINATION
Message left for patient indicating that Dr. [place]z [place].  Patient previously saw Dr. El at the [facility].  Patient asked to call back if she truly plans to switch dermatologists.   Pfizer dose 1 and 2

## 2022-01-30 NOTE — ED PROVIDER NOTE - NSFOLLOWUPINSTRUCTIONS_ED_ALL_ED_FT
Atrial Fibrillation    Atrial fibrillation is a type of irregular heartbeat (arrhythmia) where the heart quivers continuously in a chaotic pattern that makes the heart unable to pump blood normally. This can increase the risk for stroke, heart failure, and other heart-related conditions. Atrial fibrillation can be caused by a variety of conditions and may be temporary, intermittent, or permanent. Symptoms include feeling that your heart is beating rapidly or irregularly, chest discomfort, shortness of breath, or dizziness/lightheadedness that may be worse with exertion. Treatment is varied but may involve medication or electrical shock (cardioversion).    SEEK IMMEDIATE MEDICAL CARE IF YOU HAVE ANY OF THE FOLLOWING SYMPTOMS: chest pain, shortness of breath, abdominal pain, sweating, vomiting, blood in vomit/bowel movements/urine, dizziness/lightheadedness, weakness or numbness to face/arm/leg, trouble speaking or understanding, facial droop.     PLEASE FOLLOW-UP WITH YOUR PRIMARY CARE DOCTOR IN 1-2 DAY FOR FURTHER EVALUATION.  PLEASE TAKE ALL PAPERWORK FROM TODAY'S VISIT TO YOUR PRIMARY DOCTOR.  IF YOU DO NOT HAVE A PRIMARY CARE DOCTOR PLEASE REFER TO THE OFFICE/CLINIC INFORMATION GIVEN ABOVE.  YOU MAY ALSO CALL [phone] AND ASK FOR MS. SHABNAM STEVENSON.  SHE CAN HELP YOU MAKE A FOLLOW-UP APPOINTMENT.  HER HOURS ARE 11AM-7PM MONDAY - FRIDAY.

## 2022-01-30 NOTE — ED PROVIDER NOTE - PATIENT PORTAL LINK FT
You can access the FollowMyHealth Patient Portal offered by [facility] by registering at the following website: [email or web address]. By joining [employer]’s FollowMyHealth portal, you will also be able to view your health information using other applications (apps) compatible with our system.

## 2022-01-30 NOTE — ED PROVIDER NOTE - CLINICAL SUMMARY MEDICAL DECISION MAKING FREE TEXT BOX
72 y/o F presents to ED c/o palpitations in the setting of Afib with 's.  She took metoprolol at home pta and her pt converted to NSR shortly after arrival to ED without any other intervention.  Pt seen in this ED by this provider for the same 6 days ago.  She feels well.  VSS, NAD.  Labs wnl.    Results and diagnosis discussed with patient.  Treatment plan discussed.  Return precautions discussed.  Pt verbalized understanding and given the opportunity to ask questions.  Patient advised to follow up with primary care provider.

## 2022-01-30 NOTE — ED PROVIDER NOTE - CHPI ED SYMPTOMS NEG
no back pain/no chest pain/no cough/no dizziness/no fever/no nausea/no shortness of breath/no syncope/no vomiting/no chills

## 2022-01-30 NOTE — ED ADULT NURSE NOTE - OBJECTIVE STATEMENT
Pt presents to ED with variable -160's. Per EMS pt reported feeling palpitations at home and promptly took 100 mg PO Metoprolol. Did not rcve medications enroute. On exam pt was placed on cardiac mon and ekg was obtained to see pt broke rapid afib. NP Colon made aware, Pt is well appearing, appears well perfused, denies CP, denies SOB.

## 2022-02-26 ENCOUNTER — EMERGENCY (EMERGENCY)
Facility: HOSPITAL | Age: 72
LOS: 1 days | Discharge: HOME | End: 2022-02-26
Attending: EMERGENCY MEDICINE | Admitting: EMERGENCY MEDICINE
Payer: MEDICARE

## 2022-02-26 VITALS — RESPIRATION RATE: 16 BRPM | HEART RATE: 88 BPM | OXYGEN SATURATION: 97 % | TEMPERATURE: 98 F

## 2022-02-26 VITALS
OXYGEN SATURATION: 95 % | DIASTOLIC BLOOD PRESSURE: 75 MMHG | SYSTOLIC BLOOD PRESSURE: 126 MMHG | HEART RATE: 100 BPM | HEIGHT: 62 IN | TEMPERATURE: 98 F | RESPIRATION RATE: 16 BRPM

## 2022-02-26 DIAGNOSIS — I25.2 OLD MYOCARDIAL INFARCTION: ICD-10-CM

## 2022-02-26 DIAGNOSIS — I50.9 HEART FAILURE, UNSPECIFIED: ICD-10-CM

## 2022-02-26 DIAGNOSIS — R00.2 PALPITATIONS: ICD-10-CM

## 2022-02-26 DIAGNOSIS — B19.20 UNSPECIFIED VIRAL HEPATITIS C WITHOUT HEPATIC COMA: ICD-10-CM

## 2022-02-26 DIAGNOSIS — Z79.82 LONG TERM (CURRENT) USE OF ASPIRIN: ICD-10-CM

## 2022-02-26 DIAGNOSIS — Z96.641 PRESENCE OF RIGHT ARTIFICIAL HIP JOINT: Chronic | ICD-10-CM

## 2022-02-26 DIAGNOSIS — Z20.822 CONTACT WITH AND (SUSPECTED) EXPOSURE TO COVID-19: ICD-10-CM

## 2022-02-26 DIAGNOSIS — M79.602 PAIN IN LEFT ARM: ICD-10-CM

## 2022-02-26 DIAGNOSIS — E78.5 HYPERLIPIDEMIA, UNSPECIFIED: ICD-10-CM

## 2022-02-26 DIAGNOSIS — I48.91 UNSPECIFIED ATRIAL FIBRILLATION: ICD-10-CM

## 2022-02-26 DIAGNOSIS — I11.0 HYPERTENSIVE HEART DISEASE WITH HEART FAILURE: ICD-10-CM

## 2022-02-26 DIAGNOSIS — I25.10 ATHEROSCLEROTIC HEART DISEASE OF NATIVE CORONARY ARTERY WITHOUT ANGINA PECTORIS: ICD-10-CM

## 2022-02-26 DIAGNOSIS — E87.6 HYPOKALEMIA: ICD-10-CM

## 2022-02-26 LAB
ANION GAP SERPL CALC-SCNC: 11 MMOL/L — SIGNIFICANT CHANGE UP (ref 9–16)
ANION GAP SERPL CALC-SCNC: 7 MMOL/L — LOW (ref 9–16)
BASOPHILS # BLD AUTO: 0.07 K/UL — SIGNIFICANT CHANGE UP (ref 0–0.2)
BASOPHILS NFR BLD AUTO: 0.6 % — SIGNIFICANT CHANGE UP (ref 0–2)
BUN SERPL-MCNC: 55 MG/DL — HIGH (ref 7–23)
BUN SERPL-MCNC: 59 MG/DL — HIGH (ref 7–23)
CALCIUM SERPL-MCNC: 10.2 MG/DL — SIGNIFICANT CHANGE UP (ref 8.5–10.5)
CALCIUM SERPL-MCNC: 9.4 MG/DL — SIGNIFICANT CHANGE UP (ref 8.5–10.5)
CHLORIDE SERPL-SCNC: 91 MMOL/L — LOW (ref 96–108)
CHLORIDE SERPL-SCNC: 93 MMOL/L — LOW (ref 96–108)
CO2 SERPL-SCNC: 33 MMOL/L — HIGH (ref 22–31)
CO2 SERPL-SCNC: 35 MMOL/L — HIGH (ref 22–31)
CREAT SERPL-MCNC: 1.14 MG/DL — SIGNIFICANT CHANGE UP (ref 0.5–1.3)
CREAT SERPL-MCNC: 1.37 MG/DL — HIGH (ref 0.5–1.3)
EOSINOPHIL # BLD AUTO: 0.13 K/UL — SIGNIFICANT CHANGE UP (ref 0–0.5)
EOSINOPHIL NFR BLD AUTO: 1.1 % — SIGNIFICANT CHANGE UP (ref 0–6)
GLUCOSE SERPL-MCNC: 115 MG/DL — HIGH (ref 70–99)
GLUCOSE SERPL-MCNC: 125 MG/DL — HIGH (ref 70–99)
HCT VFR BLD CALC: 44.6 % — SIGNIFICANT CHANGE UP (ref 34.5–45)
HGB BLD-MCNC: 14.7 G/DL — SIGNIFICANT CHANGE UP (ref 11.5–15.5)
IMM GRANULOCYTES NFR BLD AUTO: 0.4 % — SIGNIFICANT CHANGE UP (ref 0–1.5)
LYMPHOCYTES # BLD AUTO: 2.46 K/UL — SIGNIFICANT CHANGE UP (ref 1–3.3)
LYMPHOCYTES # BLD AUTO: 21 % — SIGNIFICANT CHANGE UP (ref 13–44)
MAGNESIUM SERPL-MCNC: 2.5 MG/DL — SIGNIFICANT CHANGE UP (ref 1.6–2.6)
MCHC RBC-ENTMCNC: 27.4 PG — SIGNIFICANT CHANGE UP (ref 27–34)
MCHC RBC-ENTMCNC: 33 GM/DL — SIGNIFICANT CHANGE UP (ref 32–36)
MCV RBC AUTO: 83.1 FL — SIGNIFICANT CHANGE UP (ref 80–100)
MONOCYTES # BLD AUTO: 1.2 K/UL — HIGH (ref 0–0.9)
MONOCYTES NFR BLD AUTO: 10.3 % — SIGNIFICANT CHANGE UP (ref 2–14)
NEUTROPHILS # BLD AUTO: 7.79 K/UL — HIGH (ref 1.8–7.4)
NEUTROPHILS NFR BLD AUTO: 66.6 % — SIGNIFICANT CHANGE UP (ref 43–77)
NRBC # BLD: 0 /100 WBCS — SIGNIFICANT CHANGE UP (ref 0–0)
NT-PROBNP SERPL-SCNC: 172 PG/ML — SIGNIFICANT CHANGE UP
PLATELET # BLD AUTO: 357 K/UL — SIGNIFICANT CHANGE UP (ref 150–400)
POTASSIUM SERPL-MCNC: 2.8 MMOL/L — CRITICAL LOW (ref 3.5–5.3)
POTASSIUM SERPL-MCNC: 5.7 MMOL/L — HIGH (ref 3.5–5.3)
POTASSIUM SERPL-SCNC: 2.8 MMOL/L — CRITICAL LOW (ref 3.5–5.3)
POTASSIUM SERPL-SCNC: 5.7 MMOL/L — HIGH (ref 3.5–5.3)
RBC # BLD: 5.37 M/UL — HIGH (ref 3.8–5.2)
RBC # FLD: 14.7 % — HIGH (ref 10.3–14.5)
SARS-COV-2 RNA SPEC QL NAA+PROBE: SIGNIFICANT CHANGE UP
SODIUM SERPL-SCNC: 133 MMOL/L — SIGNIFICANT CHANGE UP (ref 132–145)
SODIUM SERPL-SCNC: 137 MMOL/L — SIGNIFICANT CHANGE UP (ref 132–145)
TROPONIN I, HIGH SENSITIVITY RESULT: 37.3 NG/L — SIGNIFICANT CHANGE UP
TROPONIN I, HIGH SENSITIVITY RESULT: 48.2 NG/L — SIGNIFICANT CHANGE UP
TROPONIN I, HIGH SENSITIVITY RESULT: 53.4 NG/L — HIGH
WBC # BLD: 11.7 K/UL — HIGH (ref 3.8–10.5)
WBC # FLD AUTO: 11.7 K/UL — HIGH (ref 3.8–10.5)

## 2022-02-26 PROCEDURE — 99285 EMERGENCY DEPT VISIT HI MDM: CPT

## 2022-02-26 PROCEDURE — 93010 ELECTROCARDIOGRAM REPORT: CPT

## 2022-02-26 PROCEDURE — 71045 X-RAY EXAM CHEST 1 VIEW: CPT | Mod: 26

## 2022-02-26 RX ORDER — ACETAMINOPHEN 500 MG
650 TABLET ORAL ONCE
Refills: 0 | Status: COMPLETED | OUTPATIENT
Start: 2022-02-26 | End: 2022-02-26

## 2022-02-26 RX ORDER — POTASSIUM CHLORIDE 20 MEQ
10 PACKET (EA) ORAL ONCE
Refills: 0 | Status: COMPLETED | OUTPATIENT
Start: 2022-02-26 | End: 2022-02-26

## 2022-02-26 RX ORDER — POTASSIUM CHLORIDE 20 MEQ
40 PACKET (EA) ORAL ONCE
Refills: 0 | Status: COMPLETED | OUTPATIENT
Start: 2022-02-26 | End: 2022-02-26

## 2022-02-26 RX ORDER — SODIUM CHLORIDE 9 MG/ML
250 INJECTION INTRAMUSCULAR; INTRAVENOUS; SUBCUTANEOUS ONCE
Refills: 0 | Status: COMPLETED | OUTPATIENT
Start: 2022-02-26 | End: 2022-02-26

## 2022-02-26 RX ADMIN — Medication 650 MILLIGRAM(S): at 16:21

## 2022-02-26 RX ADMIN — Medication 100 MILLIEQUIVALENT(S): at 15:55

## 2022-02-26 RX ADMIN — SODIUM CHLORIDE 500 MILLILITER(S): 9 INJECTION INTRAMUSCULAR; INTRAVENOUS; SUBCUTANEOUS at 16:22

## 2022-02-26 RX ADMIN — Medication 40 MILLIEQUIVALENT(S): at 15:52

## 2022-02-26 NOTE — ED PROVIDER NOTE - ST/T WAVE
ST depressions in the inferior and lateral leads; no ST elevations. T wave flattening in precordial leads. no ST elevations or depressions. no ectopy.

## 2022-02-26 NOTE — ED ADULT NURSE NOTE - NSIMPLEMENTINTERV_GEN_ALL_ED
Implemented All Universal Safety Interventions:  [place] to call system. Call bell, personal items and telephone within reach. Instruct patient to call for assistance. Room bathroom lighting operational. Non-slip footwear when patient is off stretcher. Physically safe environment: no spills, clutter or unnecessary equipment. Stretcher in lowest position, wheels locked, appropriate side rails in place.

## 2022-02-26 NOTE — ED ADULT NURSE REASSESSMENT NOTE - NS ED NURSE REASSESS COMMENT FT1
Pt received from Juanito BREAUX. Pt resting comfortably in bed. No s/s of acute distress. Pt currently getting potassium infusion. Will continue to monitor

## 2022-02-26 NOTE — ED PROVIDER NOTE - NSICDXPASTMEDICALHX_GEN_ALL_CORE_FT
PAST MEDICAL HISTORY:  Colon polyp     Coronary artery disease     Fibromyalgia     Hepatitis C     Herniated disc, cervical     High cholesterol     HLD (hyperlipidemia)     HTN (hypertension)     HTN (hypertension)     Myocardial infarction 2001 at [facility] - reports LAD    Myocardial infarction, unspecified MI type, unspecified artery       Atrial fibrillation     CHF (congestive heart failure)     Obesity

## 2022-02-26 NOTE — ED PROVIDER NOTE - PROGRESS NOTE DETAILS
troponin increased serially but remained within normal range. I will hold her in the ED for a third troponin level. Significant hypokalemia noted, which has been an ongoing issue for the patient and she takes potassium supplementation, but recently has cut down to taking it only once a day and takes in the mornings at the same time that she takes her torsemide. I do think this degree of hypokalemia is arrhythmogenic and warrants admission but patient refuses this; she does, however, agree to stay for repeat troponin and K level and to adjust schedule of potassium dosing when she goes home and see her cardiologist about this in the next few days. will sign out care to Dr. Casey now at change of shift to follow up results and dispo as appropriate.

## 2022-02-26 NOTE — ED PROVIDER NOTE - CARDIAC, MLM
Normal rate, regular rhythm.  Heart sounds S1, S2.  No murmurs, rubs or gallops. Radial pulses 2+ and symmetric, no edema.

## 2022-02-26 NOTE — ED ADULT NURSE NOTE - NSICDXPASTMEDICALHX_GEN_ALL_CORE_FT
PAST MEDICAL HISTORY:  Colon polyp     Coronary artery disease     Fibromyalgia     Hepatitis C     Herniated disc, cervical     High cholesterol     HLD (hyperlipidemia)     HTN (hypertension)     HTN (hypertension)     Myocardial infarction 2001 at [facility] - reports LAD    Myocardial infarction, unspecified MI type, unspecified artery       Atrial fibrillation     CHF (congestive heart failure)     Obesity

## 2022-02-26 NOTE — ED PROVIDER NOTE - OBJECTIVE STATEMENT
70 y/o Woman with PMHx of CAD, CHF, A-fib on Xarelto, Obesity, fibromyalgia, HTN, and HLD who is brought to the ED by EMS with complaint of rapid heart beat. Pt says this has happened to her many times before, most recently about 1 month ago. The episode lasted about 1 hour and she took an extra dose of her 50 mg Metoprolol and around the time that EMS arrived her palpitations resolved. While she was having the palpitations she did have a feeling of achiness all over her body but mostly down her left arm which is a typical feeling she has whenever this happens. She did not have any chest pain. She states she can not really say she felt short of breath because she feels short of breath all the time and gets very little exercise. Currently she is not having palpitations but says she feels weak all over. No recent illness. She hasn't missed any dose of her medications recently.

## 2022-02-26 NOTE — ED PROVIDER NOTE - LAB INTERPRETATION
significant hypokalemia-->IV and PO KCl ordered for repletion. troponin within normal range but higher than on previous visits. normal BNP

## 2022-02-26 NOTE — ED PROVIDER NOTE - CLINICAL SUMMARY MEDICAL DECISION MAKING FREE TEXT BOX
Pt with extensive cardiac Hx including A-fib presenting after resolved episode of tachycardia and dysrhythmia (SVT vs A-fib vs sinus tach vs less likely V tach). Now in NSR but noted to have diffuse wheezing. Subjectively feels generalized weakness all over and pain down the left arm concerning for myocardial ischemia. Plan for labs, monitor, and CXR. Pt with extensive cardiac Hx including A-fib presenting after resolved episode of tachycardia and dysrhythmia (SVT vs A-fib vs sinus tach vs less likely V tach). Now in NSR but noted to have diffuse wheezing. Subjectively feels generalized weakness now, no chest pain. EKG does not show evidence of acute infarct but suggests supply-demand mismatch. Plan for labs, monitor, and CXR.

## 2022-02-26 NOTE — ED ADULT NURSE NOTE - OBJECTIVE STATEMENT
Pt w/ PMH of arrythmia SVT vs AFib on metoprolol BIBA from home c/o resolved episode of palpitations lasting greater than 1 hour.  Pt's tachycardia was observed by EMS to be SVT and endorsed that it resolved w/o intervention.  Pt endorses she called her cardiologist who told her to take an additional dose of her metoprolol.  Pt denies CP, SOB or palpitations at this time.  Pt on CCM, VSS as documented, pending labs and CXR.

## 2022-02-26 NOTE — ED PROVIDER NOTE - PATIENT PORTAL LINK FT
You can access the FollowMyHealth Patient Portal offered by [facility] by registering at the following website: [email or web address]. By joining [employer]’s FollowMyHealth portal, you will also be able to view your health information using other applications (apps) compatible with our system.

## 2022-02-26 NOTE — ED ADULT TRIAGE NOTE - CHIEF COMPLAINT QUOTE
Pt BIBA from home. Found in SVT on scene. As per EMS Rhythm broke spontaneously into NSR without treatment. Pt took extra metoprolol as per her doctor.

## 2022-03-17 ENCOUNTER — EMERGENCY (EMERGENCY)
Facility: HOSPITAL | Age: 72
LOS: 1 days | Discharge: HOME | End: 2022-03-17
Attending: EMERGENCY MEDICINE | Admitting: EMERGENCY MEDICINE
Payer: MEDICARE

## 2022-03-17 VITALS
HEART RATE: 94 BPM | HEIGHT: 62 IN | SYSTOLIC BLOOD PRESSURE: 100 MMHG | RESPIRATION RATE: 20 BRPM | TEMPERATURE: 98 F | OXYGEN SATURATION: 95 % | DIASTOLIC BLOOD PRESSURE: 67 MMHG

## 2022-03-17 VITALS
SYSTOLIC BLOOD PRESSURE: 113 MMHG | TEMPERATURE: 98 F | DIASTOLIC BLOOD PRESSURE: 78 MMHG | HEART RATE: 88 BPM | RESPIRATION RATE: 20 BRPM | OXYGEN SATURATION: 94 %

## 2022-03-17 DIAGNOSIS — I11.0 HYPERTENSIVE HEART DISEASE WITH HEART FAILURE: ICD-10-CM

## 2022-03-17 DIAGNOSIS — I25.2 OLD MYOCARDIAL INFARCTION: ICD-10-CM

## 2022-03-17 DIAGNOSIS — Z96.641 PRESENCE OF RIGHT ARTIFICIAL HIP JOINT: Chronic | ICD-10-CM

## 2022-03-17 DIAGNOSIS — R00.2 PALPITATIONS: ICD-10-CM

## 2022-03-17 DIAGNOSIS — I25.10 ATHEROSCLEROTIC HEART DISEASE OF NATIVE CORONARY ARTERY WITHOUT ANGINA PECTORIS: ICD-10-CM

## 2022-03-17 DIAGNOSIS — Z79.82 LONG TERM (CURRENT) USE OF ASPIRIN: ICD-10-CM

## 2022-03-17 DIAGNOSIS — E78.00 PURE HYPERCHOLESTEROLEMIA, UNSPECIFIED: ICD-10-CM

## 2022-03-17 DIAGNOSIS — I48.0 PAROXYSMAL ATRIAL FIBRILLATION: ICD-10-CM

## 2022-03-17 DIAGNOSIS — I50.9 HEART FAILURE, UNSPECIFIED: ICD-10-CM

## 2022-03-17 DIAGNOSIS — B19.20 UNSPECIFIED VIRAL HEPATITIS C WITHOUT HEPATIC COMA: ICD-10-CM

## 2022-03-17 PROBLEM — E66.9 OBESITY, UNSPECIFIED: Chronic | Status: ACTIVE | Noted: 2022-02-26

## 2022-03-17 PROBLEM — I48.91 UNSPECIFIED ATRIAL FIBRILLATION: Chronic | Status: ACTIVE | Noted: 2022-02-26

## 2022-03-17 LAB
ALBUMIN SERPL ELPH-MCNC: 3 G/DL — LOW (ref 3.4–5)
ALP SERPL-CCNC: 144 U/L — HIGH (ref 40–120)
ALT FLD-CCNC: 37 U/L — SIGNIFICANT CHANGE UP (ref 12–42)
ANION GAP SERPL CALC-SCNC: 3 MMOL/L — LOW (ref 9–16)
APTT BLD: 28.2 SEC — SIGNIFICANT CHANGE UP (ref 27.5–35.5)
AST SERPL-CCNC: 24 U/L — SIGNIFICANT CHANGE UP (ref 15–37)
BILIRUB SERPL-MCNC: 0.2 MG/DL — SIGNIFICANT CHANGE UP (ref 0.2–1.2)
BUN SERPL-MCNC: 40 MG/DL — HIGH (ref 7–23)
CALCIUM SERPL-MCNC: 8.7 MG/DL — SIGNIFICANT CHANGE UP (ref 8.5–10.5)
CHLORIDE SERPL-SCNC: 104 MMOL/L — SIGNIFICANT CHANGE UP (ref 96–108)
CK MB BLD-MCNC: 2.2 % — SIGNIFICANT CHANGE UP
CK MB CFR SERPL CALC: 2.2 NG/ML — SIGNIFICANT CHANGE UP (ref 0.5–3.6)
CK SERPL-CCNC: 100 U/L — SIGNIFICANT CHANGE UP (ref 26–192)
CO2 SERPL-SCNC: 25 MMOL/L — SIGNIFICANT CHANGE UP (ref 22–31)
CREAT SERPL-MCNC: 0.89 MG/DL — SIGNIFICANT CHANGE UP (ref 0.5–1.3)
EGFR: 70 ML/MIN/1.73M2 — SIGNIFICANT CHANGE UP
GLUCOSE SERPL-MCNC: 156 MG/DL — HIGH (ref 70–99)
HCT VFR BLD CALC: 40 % — SIGNIFICANT CHANGE UP (ref 34.5–45)
HGB BLD-MCNC: 12.6 G/DL — SIGNIFICANT CHANGE UP (ref 11.5–15.5)
INR BLD: 0.97 — SIGNIFICANT CHANGE UP (ref 0.88–1.16)
MAGNESIUM SERPL-MCNC: 2 MG/DL — SIGNIFICANT CHANGE UP (ref 1.6–2.6)
MCHC RBC-ENTMCNC: 27.2 PG — SIGNIFICANT CHANGE UP (ref 27–34)
MCHC RBC-ENTMCNC: 31.5 GM/DL — LOW (ref 32–36)
MCV RBC AUTO: 86.2 FL — SIGNIFICANT CHANGE UP (ref 80–100)
NRBC # BLD: 0 /100 WBCS — SIGNIFICANT CHANGE UP (ref 0–0)
NT-PROBNP SERPL-SCNC: 255 PG/ML — SIGNIFICANT CHANGE UP
PLATELET # BLD AUTO: 343 K/UL — SIGNIFICANT CHANGE UP (ref 150–400)
POTASSIUM SERPL-MCNC: 3.7 MMOL/L — SIGNIFICANT CHANGE UP (ref 3.5–5.3)
POTASSIUM SERPL-SCNC: 3.7 MMOL/L — SIGNIFICANT CHANGE UP (ref 3.5–5.3)
PROT SERPL-MCNC: 7.2 G/DL — SIGNIFICANT CHANGE UP (ref 6.4–8.2)
PROTHROM AB SERPL-ACNC: 11.3 SEC — SIGNIFICANT CHANGE UP (ref 10.5–13.4)
RBC # BLD: 4.64 M/UL — SIGNIFICANT CHANGE UP (ref 3.8–5.2)
RBC # FLD: 16.4 % — HIGH (ref 10.3–14.5)
SODIUM SERPL-SCNC: 132 MMOL/L — SIGNIFICANT CHANGE UP (ref 132–145)
TROPONIN I, HIGH SENSITIVITY RESULT: 16.6 NG/L — SIGNIFICANT CHANGE UP
TSH SERPL-MCNC: 1.96 UIU/ML — SIGNIFICANT CHANGE UP (ref 0.36–3.74)
WBC # BLD: 11 K/UL — HIGH (ref 3.8–10.5)
WBC # FLD AUTO: 11 K/UL — HIGH (ref 3.8–10.5)

## 2022-03-17 PROCEDURE — 99285 EMERGENCY DEPT VISIT HI MDM: CPT | Mod: 25

## 2022-03-17 PROCEDURE — 93010 ELECTROCARDIOGRAM REPORT: CPT | Mod: 59

## 2022-03-17 PROCEDURE — 71045 X-RAY EXAM CHEST 1 VIEW: CPT | Mod: 26

## 2022-03-17 RX ORDER — ACETAMINOPHEN 500 MG
650 TABLET ORAL ONCE
Refills: 0 | Status: COMPLETED | OUTPATIENT
Start: 2022-03-17 | End: 2022-03-17

## 2022-03-17 RX ORDER — LIDOCAINE 4 G/100G
2 CREAM TOPICAL ONCE
Refills: 0 | Status: COMPLETED | OUTPATIENT
Start: 2022-03-17 | End: 2022-03-17

## 2022-03-17 RX ADMIN — LIDOCAINE 2 PATCH: 4 CREAM TOPICAL at 08:19

## 2022-03-17 RX ADMIN — Medication 650 MILLIGRAM(S): at 08:19

## 2022-03-17 NOTE — ED ADULT NURSE NOTE - OBJECTIVE STATEMENT
Patient began to have heart palpitations that began this morning at 0230am, states she took 50mg of Metroprolol and symptoms resolved. Endorses hx of Atrial fibrillation. Denies CP or SOB upon ED arrival.

## 2022-03-17 NOTE — ED PROVIDER NOTE - NSFOLLOWUPINSTRUCTIONS_ED_ALL_ED_FT
Please follow up at [facility] this week.  Please return if you have any concerns at any time.

## 2022-03-17 NOTE — ED PROVIDER NOTE - PATIENT PORTAL LINK FT
You can access the FollowMyHealth Patient Portal offered by [facility] by registering at the following website: [email or web address]. By joining [employer]’s FollowMyHealth portal, you will also be able to view your health information using other applications (apps) compatible with our system.

## 2022-03-17 NOTE — ED PROVIDER NOTE - NSICDXPASTMEDICALHX_GEN_ALL_CORE_FT
PAST MEDICAL HISTORY:  Atrial fibrillation     CHF (congestive heart failure)     Colon polyp     Coronary artery disease     Fibromyalgia     Hepatitis C     Herniated disc, cervical     High cholesterol     HLD (hyperlipidemia)     HTN (hypertension)     HTN (hypertension)     Myocardial infarction 2001 at [facility] - reports LAD    Myocardial infarction, unspecified MI type, unspecified artery     Obesity

## 2022-03-17 NOTE — ED PROVIDER NOTE - CLINICAL SUMMARY MEDICAL DECISION MAKING FREE TEXT BOX
Pt also complained about bilateral chronic shoulder pain during visit and requested Lidocaine patches. She was also given a meal and decaf coffee. Pt BMP, labs, Troponin and negative. Chest X-ray and EKG normal. Will discharge. Pt states she will see her cardiologist later this week.

## 2022-03-17 NOTE — ED ADULT TRIAGE NOTE - CHIEF COMPLAINT QUOTE
Pt BIBA, c/o heart palpitations that began at 0230 this morning, states she took 50mg of Metroprolol. Pt endorses symptoms have resolved and hx of Atrial fibrillation. Denies CP or SOB. Pt BIBA, c/o heart palpitations that began at 0230am, states she took 50mg of Metroprolol and symptoms resolved. Endorses hx of Atrial fibrillation. Denies CP or SOB upon ED arrival.

## 2022-03-17 NOTE — ED PROVIDER NOTE - CARE PROVIDER_API CALL
DANIELA GABRIEL.  Internal Medicine  [address] [place] [address],[address]  [place] [address]  Phone: ([phone]  Fax: ()-  Follow Up Time:

## 2022-03-17 NOTE — ED ADULT NURSE NOTE - CHIEF COMPLAINT QUOTE
Pt BIBA, c/o heart palpitations that began at 0230am, states she took 50mg of Metroprolol and symptoms resolved. Endorses hx of Atrial fibrillation. Denies CP or SOB upon ED arrival.

## 2022-03-17 NOTE — ED ADULT NURSE NOTE - EXTENSIONS OF SELF_ADULT
Telephone Encounter by Jacquelin Delaney DO at 09/20/18 08:41 AM     Author:  Jacquelin Delaney DO Service:  (none) Author Type:  Physician     Filed:  09/20/18 08:42 AM Encounter Date:  9/18/2018 Status:  Signed     :  Jacquelin Delaney DO (Physician)            Please have patient see counselor and see how she does  If she still feel she needs medication, then I can see her for this[SD1.1M]      Revision History        User Key Date/Time User Provider Type Action    > SD1.1 09/20/18 08:42 AM Jacquelin Delaney DO Physician Sign    M - Manual             None

## 2022-03-17 NOTE — ED ADULT NURSE REASSESSMENT NOTE - NS ED NURSE REASSESS COMMENT FT1
rpt rcvd from NAMITA Robbins. Pt in NSR, denies any palpitations. Safety and comfort measures initiated.

## 2022-03-17 NOTE — ED ADULT TRIAGE NOTE - ARRIVAL FROM
Date of Service: 01/29/2021    HISTORY:  The patient presents for skin examination.  He has a history of multiple squamous cell carcinomas, a few basal cell carcinomas, and multiple solar keratoses, all noted and reviewed in the problem list.  He is also status post heart transplantation in 07/2007.    The patient has regularly used topical 5% 5-Fluorouracil to treat solar keratoses of his lateral face, forehead and scalp.  His last treatment for 2 weeks was in 10/2020 and had significant irritation and peeling of multiple small solar keratoses.  He notes a few rough, raised, tender residual keratoses in that vicinity that did not respond to the Efudex.  He is not aware of any other concerning skin lesions or skin problems.    PHYSICAL EXAMINATION -- DETAILED SKIN EXAM CONTINUED:  Pink-white, scaly, rough solar keratoses, slightly hypertrophic type, located on the left lateral forehead, right crown of scalp, right scalp vertex, left ear superior helix, and right forearm.    No other concerning skin lesions seen.    IMPRESSION:  1.  Solar keratoses, 5 lesions as noted above.  2.  Benign lesions noted above.  3.  History of multiple skin cancers.    TREATMENT PLAN:  1.  Discussed management options.  2.  Liquid Nitrogen cryotherapy applied to the 5 hypertrophic solar keratoses.  3.  Sunscreens and self skin exams.  4.  Follow up as noted.      Dictated By: Eleazar Vaz MD  Signing Provider: MD CROW Nickerson/theresa ([id number])  DD: 01/29/2021 19:54:59 TD: 01/31/2021 07:32:37    Copy Sent To:    Home

## 2022-03-17 NOTE — ED PROVIDER NOTE - OBJECTIVE STATEMENT
70 y/o F with PMHx of paroxysmal atrial fibrillation, on Xarelto and Plavix, states that she felt fluttering in chest (felt her heart beat rapid, irregular) and called 911. Pt denies pain. Her cardiologist is Dr. Ireland at [facility], attempted to call him but received no call back.

## 2022-03-26 NOTE — ED ADULT NURSE NOTE - SUICIDE SCREENING QUESTION 2
No

## 2022-04-07 ENCOUNTER — EMERGENCY (EMERGENCY)
Facility: HOSPITAL | Age: 72
LOS: 1 days | Discharge: HOME | End: 2022-04-07
Admitting: EMERGENCY MEDICINE
Payer: MEDICARE

## 2022-04-07 VITALS
SYSTOLIC BLOOD PRESSURE: 143 MMHG | HEART RATE: 89 BPM | TEMPERATURE: 98 F | OXYGEN SATURATION: 98 % | DIASTOLIC BLOOD PRESSURE: 77 MMHG | RESPIRATION RATE: 18 BRPM

## 2022-04-07 VITALS
SYSTOLIC BLOOD PRESSURE: 106 MMHG | TEMPERATURE: 98 F | HEIGHT: 62 IN | RESPIRATION RATE: 18 BRPM | OXYGEN SATURATION: 98 % | HEART RATE: 152 BPM | DIASTOLIC BLOOD PRESSURE: 76 MMHG | WEIGHT: 250 LBS

## 2022-04-07 DIAGNOSIS — I50.9 HEART FAILURE, UNSPECIFIED: ICD-10-CM

## 2022-04-07 DIAGNOSIS — R00.2 PALPITATIONS: ICD-10-CM

## 2022-04-07 DIAGNOSIS — I25.10 ATHEROSCLEROTIC HEART DISEASE OF NATIVE CORONARY ARTERY WITHOUT ANGINA PECTORIS: ICD-10-CM

## 2022-04-07 DIAGNOSIS — Z79.02 LONG TERM (CURRENT) USE OF ANTITHROMBOTICS/ANTIPLATELETS: ICD-10-CM

## 2022-04-07 DIAGNOSIS — Z95.5 PRESENCE OF CORONARY ANGIOPLASTY IMPLANT AND GRAFT: ICD-10-CM

## 2022-04-07 DIAGNOSIS — Z96.641 PRESENCE OF RIGHT ARTIFICIAL HIP JOINT: Chronic | ICD-10-CM

## 2022-04-07 DIAGNOSIS — Z79.01 LONG TERM (CURRENT) USE OF ANTICOAGULANTS: ICD-10-CM

## 2022-04-07 DIAGNOSIS — Z79.82 LONG TERM (CURRENT) USE OF ASPIRIN: ICD-10-CM

## 2022-04-07 DIAGNOSIS — I48.91 UNSPECIFIED ATRIAL FIBRILLATION: ICD-10-CM

## 2022-04-07 DIAGNOSIS — F17.200 NICOTINE DEPENDENCE, UNSPECIFIED, UNCOMPLICATED: ICD-10-CM

## 2022-04-07 LAB
ALBUMIN SERPL ELPH-MCNC: 3.9 G/DL — SIGNIFICANT CHANGE UP (ref 3.4–5)
ALP SERPL-CCNC: 158 U/L — HIGH (ref 40–120)
ALT FLD-CCNC: 69 U/L — HIGH (ref 12–42)
ANION GAP SERPL CALC-SCNC: 10 MMOL/L — SIGNIFICANT CHANGE UP (ref 9–16)
APTT BLD: 32.3 SEC — SIGNIFICANT CHANGE UP (ref 27.5–35.5)
AST SERPL-CCNC: 42 U/L — HIGH (ref 15–37)
BASOPHILS # BLD AUTO: 0.05 K/UL — SIGNIFICANT CHANGE UP (ref 0–0.2)
BASOPHILS NFR BLD AUTO: 0.5 % — SIGNIFICANT CHANGE UP (ref 0–2)
BILIRUB SERPL-MCNC: 0.3 MG/DL — SIGNIFICANT CHANGE UP (ref 0.2–1.2)
BUN SERPL-MCNC: 37 MG/DL — HIGH (ref 7–23)
CALCIUM SERPL-MCNC: 10 MG/DL — SIGNIFICANT CHANGE UP (ref 8.5–10.5)
CHLORIDE SERPL-SCNC: 97 MMOL/L — SIGNIFICANT CHANGE UP (ref 96–108)
CO2 SERPL-SCNC: 31 MMOL/L — SIGNIFICANT CHANGE UP (ref 22–31)
CREAT SERPL-MCNC: 1.2 MG/DL — SIGNIFICANT CHANGE UP (ref 0.5–1.3)
EGFR: 49 ML/MIN/1.73M2 — LOW
EOSINOPHIL # BLD AUTO: 0.16 K/UL — SIGNIFICANT CHANGE UP (ref 0–0.5)
EOSINOPHIL NFR BLD AUTO: 1.7 % — SIGNIFICANT CHANGE UP (ref 0–6)
GLUCOSE SERPL-MCNC: 143 MG/DL — HIGH (ref 70–99)
HCT VFR BLD CALC: 43.8 % — SIGNIFICANT CHANGE UP (ref 34.5–45)
HGB BLD-MCNC: 14 G/DL — SIGNIFICANT CHANGE UP (ref 11.5–15.5)
IMM GRANULOCYTES NFR BLD AUTO: 0.5 % — SIGNIFICANT CHANGE UP (ref 0–1.5)
INR BLD: 1.35 — HIGH (ref 0.88–1.16)
LYMPHOCYTES # BLD AUTO: 1.54 K/UL — SIGNIFICANT CHANGE UP (ref 1–3.3)
LYMPHOCYTES # BLD AUTO: 16.1 % — SIGNIFICANT CHANGE UP (ref 13–44)
MAGNESIUM SERPL-MCNC: 2.1 MG/DL — SIGNIFICANT CHANGE UP (ref 1.6–2.6)
MCHC RBC-ENTMCNC: 26.8 PG — LOW (ref 27–34)
MCHC RBC-ENTMCNC: 32 GM/DL — SIGNIFICANT CHANGE UP (ref 32–36)
MCV RBC AUTO: 83.7 FL — SIGNIFICANT CHANGE UP (ref 80–100)
MONOCYTES # BLD AUTO: 0.95 K/UL — HIGH (ref 0–0.9)
MONOCYTES NFR BLD AUTO: 9.9 % — SIGNIFICANT CHANGE UP (ref 2–14)
NEUTROPHILS # BLD AUTO: 6.84 K/UL — SIGNIFICANT CHANGE UP (ref 1.8–7.4)
NEUTROPHILS NFR BLD AUTO: 71.3 % — SIGNIFICANT CHANGE UP (ref 43–77)
NRBC # BLD: 0 /100 WBCS — SIGNIFICANT CHANGE UP (ref 0–0)
PLATELET # BLD AUTO: 329 K/UL — SIGNIFICANT CHANGE UP (ref 150–400)
POTASSIUM SERPL-MCNC: 3.1 MMOL/L — LOW (ref 3.5–5.3)
POTASSIUM SERPL-SCNC: 3.1 MMOL/L — LOW (ref 3.5–5.3)
PROT SERPL-MCNC: 8.2 G/DL — SIGNIFICANT CHANGE UP (ref 6.4–8.2)
PROTHROM AB SERPL-ACNC: 15.7 SEC — HIGH (ref 10.5–13.4)
RBC # BLD: 5.23 M/UL — HIGH (ref 3.8–5.2)
RBC # FLD: 16.3 % — HIGH (ref 10.3–14.5)
SODIUM SERPL-SCNC: 138 MMOL/L — SIGNIFICANT CHANGE UP (ref 132–145)
TSH SERPL-MCNC: 0.84 UIU/ML — SIGNIFICANT CHANGE UP (ref 0.36–3.74)
WBC # BLD: 9.59 K/UL — SIGNIFICANT CHANGE UP (ref 3.8–10.5)
WBC # FLD AUTO: 9.59 K/UL — SIGNIFICANT CHANGE UP (ref 3.8–10.5)

## 2022-04-07 PROCEDURE — 99284 EMERGENCY DEPT VISIT MOD MDM: CPT

## 2022-04-07 PROCEDURE — 93010 ELECTROCARDIOGRAM REPORT: CPT

## 2022-04-07 RX ORDER — DRONEDARONE 400 MG/1
400 TABLET, FILM COATED ORAL ONCE
Refills: 0 | Status: COMPLETED | OUTPATIENT
Start: 2022-04-07 | End: 2022-04-07

## 2022-04-07 RX ORDER — METOPROLOL TARTRATE 50 MG
5 TABLET ORAL ONCE
Refills: 0 | Status: COMPLETED | OUTPATIENT
Start: 2022-04-07 | End: 2022-04-07

## 2022-04-07 RX ORDER — ONDANSETRON 8 MG/1
4 TABLET, FILM COATED ORAL ONCE
Refills: 0 | Status: COMPLETED | OUTPATIENT
Start: 2022-04-07 | End: 2022-04-07

## 2022-04-07 RX ORDER — LIDOCAINE 4 G/100G
1 CREAM TOPICAL ONCE
Refills: 0 | Status: COMPLETED | OUTPATIENT
Start: 2022-04-07 | End: 2022-04-07

## 2022-04-07 RX ORDER — ACETAMINOPHEN 500 MG
975 TABLET ORAL ONCE
Refills: 0 | Status: COMPLETED | OUTPATIENT
Start: 2022-04-07 | End: 2022-04-07

## 2022-04-07 RX ORDER — METOPROLOL TARTRATE 50 MG
50 TABLET ORAL ONCE
Refills: 0 | Status: COMPLETED | OUTPATIENT
Start: 2022-04-07 | End: 2022-04-07

## 2022-04-07 RX ORDER — POTASSIUM CHLORIDE 20 MEQ
40 PACKET (EA) ORAL ONCE
Refills: 0 | Status: COMPLETED | OUTPATIENT
Start: 2022-04-07 | End: 2022-04-07

## 2022-04-07 RX ORDER — METOPROLOL TARTRATE 50 MG
5 TABLET ORAL ONCE
Refills: 0 | Status: DISCONTINUED | OUTPATIENT
Start: 2022-04-07 | End: 2022-04-07

## 2022-04-07 RX ADMIN — Medication 5 MILLIGRAM(S): at 14:20

## 2022-04-07 RX ADMIN — Medication 5 MILLIGRAM(S): at 12:40

## 2022-04-07 RX ADMIN — LIDOCAINE 1 PATCH: 4 CREAM TOPICAL at 12:18

## 2022-04-07 RX ADMIN — ONDANSETRON 4 MILLIGRAM(S): 8 TABLET, FILM COATED ORAL at 14:20

## 2022-04-07 RX ADMIN — Medication 975 MILLIGRAM(S): at 12:17

## 2022-04-07 RX ADMIN — DRONEDARONE 400 MILLIGRAM(S): 400 TABLET, FILM COATED ORAL at 12:40

## 2022-04-07 RX ADMIN — Medication 50 MILLIGRAM(S): at 14:17

## 2022-04-07 RX ADMIN — Medication 40 MILLIEQUIVALENT(S): at 14:17

## 2022-04-07 NOTE — ED PROVIDER NOTE - PROGRESS NOTE DETAILS
Pt d/w Dr. Ireland's office who reviewed pt chart.  Pts new Rx, Multaq 400 mg BID was added on 3/22.  Pt states she was advised to take it PRN for palpitations and states she ran out of medication.  It was written for a 1 month supply.  ? pts compliance with the medication.  Will give dose here in ED. Pt sleeping, NAD.  HR is still 120's-140's afib.  Oral metoprolol and IV metoprolol 5 mg ordered with effect.

## 2022-04-07 NOTE — ED ADULT TRIAGE NOTE - CHIEF COMPLAINT QUOTE
Pt presents BIBA from home c/o palpitations.  Pt HR @ 150 on EMS monitor, pt able to speak in complete sentences, well-appearing.  Denies CP, dizziness, SOB.  Pt w/ 18G in LAC w/ fluids infusing.  Given 25 IV Cardizem PTA.  Pt ECG in progress.

## 2022-04-07 NOTE — ED PROVIDER NOTE - OBJECTIVE STATEMENT
70 yo F w/ PMHx of atrial fibrillation, CHF, CAD with stent placed on metoprolol, losartan, atorvastatin, Plavix, Xarelto, torsemide, potassium, venlafaxine, pantoprazole, aldactone, active smoker presents to ED via EMS in rapid Afib.  Pt has presented to this ED with the same presentation several times in the recent past.  She states the palpitations woke her form her sleep and she took her home dose of Metoprolol 100 mg PO this morning prior to calling for an ambulance.  EMS administered Cardizem 25 mg IVP pta with no effect.  She arrives 's and normotensive.  Pt states her cardiologist, Dr. Ireland ([facility]) recently added an unknown medication but she did not take it today because she believes she ran out.  Pt also c/o chronic bilat shoulder and buttock pain. She is requesting Tylenol and lidocaine patch.   Pt denies trauma/falls, fevers/chills, neck or back pain, headache, visual changes, sore throat, chest pain, cough, SOB, abd pain, n/v/d, dysuria, hematuria, weakness, dizziness, numbness, lower extremity swelling, rash, sick contacts, recent hospitalizations, recent travels.

## 2022-04-07 NOTE — ED PROVIDER NOTE - PHYSICAL EXAMINATION
Constitutional:  Well appearing, obese, awake, alert, oriented to person, place, time/situation and in no apparent distress  Head:  NC/AT, symmetric, neck supple  ENMT: Airway patent, nasal mucosa clear, mouth with normal mucosa, throat has no vesicles, no oropharyngeal exudates and uvula is midline  Eyes:  Clear bilaterally, pupils equal, round and reactive to light  Cardiac:   Irregular tachycardia 140's.  Heart sounds S1,S2.  No murmurs, rubs or gallops.  No JVD.  Respiratory:  Breath sounds clear and equal bilaterally  GI:   Abd soft, non-distended, non-tender, no guarding  MSK:  Spine appears normal, range of motion is not limited, no muscle or joint tenderness  Neuro:  Alert and oriented, no focal deficits, no motor or sensory deficits  Skin:  Skin normal color for race, warm, dry and intact.  No evidence of rash  Psych:  Normal mood and affect, no apparent risk to self or others.

## 2022-04-07 NOTE — ED PROVIDER NOTE - NS ED ROS FT
244
Constitutional:  no fever, no chills  Eyes:  no discharge, no irritations, no pain, no redness, visual changes  Ears:  no ear pain, no ear drainage,  no hearing problems  Nose:  no nasal congestion, no nasal drainage  Mouth/Throat:  no hoarseness and no throat pain  Neck:  no stiffness, no pain, no lumps, no swollen glands  Cardiac:  no chest pain, no edema, + palpitations   Respiratory:  no cough, no shortness of breath  GI: no abdominal pain, no bloating, no constipation, no diarrhea, no nausea, no vomiting  :  no dysuria, no urinary frequency, no hematuria, no discharge  MSK:  no back pain, no msk pain, no weakness  NEURO:  no headache, no weakness, no numbness  Skin:  no lesions, no pruritis, no jaundice, no bruising, no rash  Psych:  no known mental health issues

## 2022-04-07 NOTE — ED PROVIDER NOTE - CLINICAL SUMMARY MEDICAL DECISION MAKING FREE TEXT BOX
72 y/o F presents to ED in AFib 's , normotensive.  Pt well appearing, VSS, NAD.  pt given home dose of Multaq 400 mg, IV metoprolol twice and oral metoprolol with effect.  Pt observed in ED.  Repeat EKG shows NSR.      Results and diagnosis discussed with patient.  Treatment plan discussed.  Return precautions discussed.  Pt verbalized understanding and given the opportunity to ask questions.  Patient advised to follow up with cardiologist.

## 2022-04-07 NOTE — ED PROVIDER NOTE - NSFOLLOWUPINSTRUCTIONS_ED_ALL_ED_FT
CONTINUE TAKING YOUR METOPROLOL AND MULTAQ PRESCRIPTIONS AS PRESCRIBED.  FOLLOW UP WITH YOUR CARDIOLOGIST WITHOUT FAIL.    Atrial Fibrillation    Atrial fibrillation is a type of irregular heartbeat (arrhythmia) where the heart quivers continuously in a chaotic pattern that makes the heart unable to pump blood normally. This can increase the risk for stroke, heart failure, and other heart-related conditions. Atrial fibrillation can be caused by a variety of conditions and may be temporary, intermittent, or permanent. Symptoms include feeling that your heart is beating rapidly or irregularly, chest discomfort, shortness of breath, or dizziness/lightheadedness that may be worse with exertion. Treatment is varied but may involve medication or electrical shock (cardioversion).    SEEK IMMEDIATE MEDICAL CARE IF YOU HAVE ANY OF THE FOLLOWING SYMPTOMS: chest pain, shortness of breath, abdominal pain, sweating, vomiting, blood in vomit/bowel movements/urine, dizziness/lightheadedness, weakness or numbness to face/arm/leg, trouble speaking or understanding, facial droop.

## 2022-04-07 NOTE — ED PROVIDER NOTE - PATIENT PORTAL LINK FT
[Conjunctiva Injected] : conjunctiva injected  [Right] : (right) [Soft] : soft [NonTender] : non tender [NL] : no abnormal lymph nodes palpated [FreeTextEntry5] : no discharge, no swelling [de-identified] : no rash You can access the FollowMyHealth Patient Portal offered by [facility] by registering at the following website: [email or web address]. By joining [facility]’s FollowMyHealth portal, you will also be able to view your health information using other applications (apps) compatible with our system.

## 2022-04-11 ENCOUNTER — EMERGENCY (EMERGENCY)
Facility: HOSPITAL | Age: 72
LOS: 1 days | Discharge: HOME | End: 2022-04-11
Attending: EMERGENCY MEDICINE | Admitting: EMERGENCY MEDICINE
Payer: MEDICARE

## 2022-04-11 VITALS
HEART RATE: 90 BPM | RESPIRATION RATE: 18 BRPM | DIASTOLIC BLOOD PRESSURE: 70 MMHG | TEMPERATURE: 98 F | HEIGHT: 62 IN | OXYGEN SATURATION: 100 % | SYSTOLIC BLOOD PRESSURE: 144 MMHG

## 2022-04-11 VITALS
OXYGEN SATURATION: 95 % | TEMPERATURE: 98 F | RESPIRATION RATE: 16 BRPM | HEART RATE: 85 BPM | SYSTOLIC BLOOD PRESSURE: 123 MMHG | DIASTOLIC BLOOD PRESSURE: 74 MMHG

## 2022-04-11 DIAGNOSIS — B19.20 UNSPECIFIED VIRAL HEPATITIS C WITHOUT HEPATIC COMA: ICD-10-CM

## 2022-04-11 DIAGNOSIS — F10.929 ALCOHOL USE, UNSPECIFIED WITH INTOXICATION, UNSPECIFIED: ICD-10-CM

## 2022-04-11 DIAGNOSIS — I11.0 HYPERTENSIVE HEART DISEASE WITH HEART FAILURE: ICD-10-CM

## 2022-04-11 DIAGNOSIS — I48.91 UNSPECIFIED ATRIAL FIBRILLATION: ICD-10-CM

## 2022-04-11 DIAGNOSIS — Z96.641 PRESENCE OF RIGHT ARTIFICIAL HIP JOINT: ICD-10-CM

## 2022-04-11 DIAGNOSIS — Z96.641 PRESENCE OF RIGHT ARTIFICIAL HIP JOINT: Chronic | ICD-10-CM

## 2022-04-11 DIAGNOSIS — I25.10 ATHEROSCLEROTIC HEART DISEASE OF NATIVE CORONARY ARTERY WITHOUT ANGINA PECTORIS: ICD-10-CM

## 2022-04-11 DIAGNOSIS — M25.551 PAIN IN RIGHT HIP: ICD-10-CM
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
## 2022-04-11 DIAGNOSIS — Z79.82 LONG TERM (CURRENT) USE OF ASPIRIN: ICD-10-CM

## 2022-04-11 DIAGNOSIS — E78.00 PURE HYPERCHOLESTEROLEMIA, UNSPECIFIED: ICD-10-CM

## 2022-04-11 DIAGNOSIS — I50.9 HEART FAILURE, UNSPECIFIED: ICD-10-CM

## 2022-04-11 DIAGNOSIS — I25.2 OLD MYOCARDIAL INFARCTION: ICD-10-CM

## 2022-04-11 LAB
ANION GAP SERPL CALC-SCNC: 9 MMOL/L — SIGNIFICANT CHANGE UP (ref 9–16)
BASOPHILS # BLD AUTO: 0.05 K/UL — SIGNIFICANT CHANGE UP (ref 0–0.2)
BASOPHILS NFR BLD AUTO: 0.4 % — SIGNIFICANT CHANGE UP (ref 0–2)
BUN SERPL-MCNC: 49 MG/DL — HIGH (ref 7–23)
CALCIUM SERPL-MCNC: 9.7 MG/DL — SIGNIFICANT CHANGE UP (ref 8.5–10.5)
CHLORIDE SERPL-SCNC: 97 MMOL/L — SIGNIFICANT CHANGE UP (ref 96–108)
CO2 SERPL-SCNC: 33 MMOL/L — HIGH (ref 22–31)
CREAT SERPL-MCNC: 1.05 MG/DL — SIGNIFICANT CHANGE UP (ref 0.5–1.3)
EGFR: 57 ML/MIN/1.73M2 — LOW
EOSINOPHIL # BLD AUTO: 0.15 K/UL — SIGNIFICANT CHANGE UP (ref 0–0.5)
EOSINOPHIL NFR BLD AUTO: 1.3 % — SIGNIFICANT CHANGE UP (ref 0–6)
ETHANOL SERPL-MCNC: 22 MG/DL — HIGH
GLUCOSE SERPL-MCNC: 135 MG/DL — HIGH (ref 70–99)
HCT VFR BLD CALC: 40.5 % — SIGNIFICANT CHANGE UP (ref 34.5–45)
HGB BLD-MCNC: 13.1 G/DL — SIGNIFICANT CHANGE UP (ref 11.5–15.5)
IMM GRANULOCYTES NFR BLD AUTO: 0.4 % — SIGNIFICANT CHANGE UP (ref 0–1.5)
LYMPHOCYTES # BLD AUTO: 1.74 K/UL — SIGNIFICANT CHANGE UP (ref 1–3.3)
LYMPHOCYTES # BLD AUTO: 15.6 % — SIGNIFICANT CHANGE UP (ref 13–44)
MCHC RBC-ENTMCNC: 27.2 PG — SIGNIFICANT CHANGE UP (ref 27–34)
MCHC RBC-ENTMCNC: 32.3 GM/DL — SIGNIFICANT CHANGE UP (ref 32–36)
MCV RBC AUTO: 84 FL — SIGNIFICANT CHANGE UP (ref 80–100)
MONOCYTES # BLD AUTO: 1.01 K/UL — HIGH (ref 0–0.9)
MONOCYTES NFR BLD AUTO: 9.1 % — SIGNIFICANT CHANGE UP (ref 2–14)
NEUTROPHILS # BLD AUTO: 8.16 K/UL — HIGH (ref 1.8–7.4)
NEUTROPHILS NFR BLD AUTO: 73.2 % — SIGNIFICANT CHANGE UP (ref 43–77)
NRBC # BLD: 0 /100 WBCS — SIGNIFICANT CHANGE UP (ref 0–0)
PLATELET # BLD AUTO: 339 K/UL — SIGNIFICANT CHANGE UP (ref 150–400)
POTASSIUM SERPL-MCNC: 2.9 MMOL/L — CRITICAL LOW (ref 3.5–5.3)
POTASSIUM SERPL-SCNC: 2.9 MMOL/L — CRITICAL LOW (ref 3.5–5.3)
RBC # BLD: 4.82 M/UL — SIGNIFICANT CHANGE UP (ref 3.8–5.2)
RBC # FLD: 15.9 % — HIGH (ref 10.3–14.5)
SODIUM SERPL-SCNC: 139 MMOL/L — SIGNIFICANT CHANGE UP (ref 132–145)
WBC # BLD: 11.15 K/UL — HIGH (ref 3.8–10.5)
WBC # FLD AUTO: 11.15 K/UL — HIGH (ref 3.8–10.5)

## 2022-04-11 PROCEDURE — 73502 X-RAY EXAM HIP UNI 2-3 VIEWS: CPT | Mod: 26,RT

## 2022-04-11 PROCEDURE — 99284 EMERGENCY DEPT VISIT MOD MDM: CPT | Mod: 25

## 2022-04-11 PROCEDURE — 73502 X-RAY EXAM HIP UNI 2-3 VIEWS: CPT | Mod: 26,RT,77

## 2022-04-11 RX ORDER — ACETAMINOPHEN 500 MG
975 TABLET ORAL ONCE
Refills: 0 | Status: COMPLETED | OUTPATIENT
Start: 2022-04-11 | End: 2022-04-11

## 2022-04-11 RX ORDER — KETOROLAC TROMETHAMINE 30 MG/ML
15 SYRINGE (ML) INJECTION ONCE
Refills: 0 | Status: DISCONTINUED | OUTPATIENT
Start: 2022-04-11 | End: 2022-04-11

## 2022-04-11 RX ORDER — DIAZEPAM 5 MG
5 TABLET ORAL ONCE
Refills: 0 | Status: DISCONTINUED | OUTPATIENT
Start: 2022-04-11 | End: 2022-04-11

## 2022-04-11 RX ORDER — POTASSIUM CHLORIDE 20 MEQ
40 PACKET (EA) ORAL ONCE
Refills: 0 | Status: COMPLETED | OUTPATIENT
Start: 2022-04-11 | End: 2022-04-11

## 2022-04-11 RX ADMIN — Medication 975 MILLIGRAM(S): at 20:15

## 2022-04-11 RX ADMIN — Medication 5 MILLIGRAM(S): at 20:50

## 2022-04-11 RX ADMIN — Medication 15 MILLIGRAM(S): at 20:15

## 2022-04-11 RX ADMIN — Medication 40 MILLIEQUIVALENT(S): at 20:15

## 2022-04-11 NOTE — ED PROVIDER NOTE - NSFOLLOWUPINSTRUCTIONS_ED_ALL_ED_FT
Hip Pain    WHAT YOU NEED TO KNOW:    Hip pain can be caused by a number of conditions, such as bursitis, arthritis, or muscle or tendon strain. You may have swelling in the fluid-filled sacs that protect your muscles and tendons. Hip pain can also be caused by a lower back problem. Hip pain may be caused by trauma, playing sports, or running. Pain may start in your hip and go to your thigh, buttock, or groin.  Hip and Pelvis         DISCHARGE INSTRUCTIONS:    Medicines:   •NSAIDs, such as ibuprofen, help decrease swelling, pain, and fever. This medicine is available with or without a doctor's order. NSAIDs can cause stomach bleeding or kidney problems in certain people. If you take blood thinner medicine, always ask your healthcare provider if NSAIDs are safe for you. Always read the medicine label and follow directions.      •Take your medicine as directed. Contact your healthcare provider if you think your medicine is not helping or if you have side effects. Tell him of her if you are allergic to any medicine. Keep a list of the medicines, vitamins, and herbs you take. Include the amounts, and when and why you take them. Bring the list or the pill bottles to follow-up visits. Carry your medicine list with you in case of an emergency.      Return to the emergency department if:   •Your pain gets worse.      •You have numbness in your leg or toes.      •You cannot put any weight on or move your hip.      Contact your healthcare provider if:   •You have a fever.      •Your pain does not decrease, even after treatment.      •You have questions or concerns about your condition or care.      Follow up with your healthcare provider as directed: You may need physical therapy, an injection, or more testing. You may need to see an orthopedic specialist. Write down your questions so you remember to ask them during your visits.    Manage your hip pain:   •Rest your injured hip so that it can heal. You may need to avoid putting any weight on your hip for at least 48 hours. Return to normal activities as directed.      •Ice the injury for 20 minutes every 4 hours, or as directed. Use an ice pack, or put crushed ice in a plastic bag. Cover it with a towel to protect your skin. Ice helps prevent tissue damage and decreases swelling and pain.      •Elevate your injured hip above the level of your heart as often as you can. This will help decrease swelling and pain. If possible, prop your hip and leg on pillows or blankets to keep the area elevated comfortably.       •Maintain a healthy weight. Extra body weight can cause pressure and pain in your hip, knee, and ankle joints. Ask your healthcare provider how much you should weigh. Ask him or her to help you create a weight loss plan if you are overweight.      •Use assistive devices as directed. You may need to use a cane or crutches. Assistive devices help decrease pain and pressure on your hip when you walk. Ask your healthcare provider for more information about assistive devices and how to use them correctly.

## 2022-04-11 NOTE — ED PROVIDER NOTE - MUSCULOSKELETAL, MLM
Patient observed to be freely ranging right hip but screams in pain on passive ROM test of the right hip.

## 2022-04-11 NOTE — ED ADULT TRIAGE NOTE - CHIEF COMPLAINT QUOTE
pt jose from restaurant c/o right hip pain s/p drinking wine states she has hip replacement 2 years ago frequently seen in this ED for rapid heart rate hx afib

## 2022-04-11 NOTE — ED PROVIDER NOTE - NSICDXPASTMEDICALHX_GEN_ALL_CORE_FT
PAST MEDICAL HISTORY:  Atrial fibrillation     CHF (congestive heart failure)     Colon polyp     Coronary artery disease     Fibromyalgia     Hepatitis C     Herniated disc, cervical     High cholesterol     HLD (hyperlipidemia)     HTN (hypertension)     HTN (hypertension)     Myocardial infarction 2001 at [facility] - reports LAD    Myocardial infarction, unspecified MI type, unspecified artery     Obesity

## 2022-04-11 NOTE — ED PROVIDER NOTE - CONSTITUTIONAL, MLM
Well appearing, awake, alert, oriented to person, place, time/situation. +Intoxicated normal... +intoxicated. Awake, alert, oriented to person, place, time/situation.

## 2022-04-11 NOTE — ED ADULT NURSE NOTE - NSICDXPASTMEDICALHX_GEN_ALL_CORE_FT
PAST MEDICAL HISTORY:  Atrial fibrillation     CHF (congestive heart failure)     Colon polyp     Coronary artery disease     Fibromyalgia     Hepatitis C     Herniated disc, cervical     High cholesterol     HLD (hyperlipidemia)     HTN (hypertension)     HTN (hypertension)     Myocardial infarction 2001 at [facility] - reports LAD    Myocardial infarction, unspecified MI type, unspecified artery     Obesity

## 2022-04-11 NOTE — ED ADULT TRIAGE NOTE - NS ED NURSE AMBULANCES
Outcome: Successful outpatient ophthalmic surgical procedure  Preprinted Instructions given to patient.  Regular diet.  Activity: No restrictions  Meds: see Med Rec  Condition: stable  Follow up: 1 day with Dr Godinez  Disposition: Home  Diagnosis: s/p eye surgery  
[facility]

## 2022-04-11 NOTE — ED PROVIDER NOTE - CLINICAL SUMMARY MEDICAL DECISION MAKING FREE TEXT BOX
Pain improved, neuro intact, able to range hip and bear wt. Given potassium for hypokalemia. VSS, no rapid a.fib. d/c home.

## 2022-04-11 NOTE — ED PROVIDER NOTE - PATIENT PORTAL LINK FT
You can access the FollowMyHealth Patient Portal offered by  by registering at the following website: [email or web address]. By joining [facility]’s FollowMyHealth portal, you will also be able to view your health information using other applications (apps) compatible with our system.

## 2022-04-11 NOTE — ED PROVIDER NOTE - OBJECTIVE STATEMENT
72 y/o female with PHMx of atrial fibrillation, CHF, CAD with stent placed on metoprolol, losartan, atorvastatin, Plavix, Xarelto, torsemide, potassium, venlafaxine, pantoprazole, and aldactone, who has prior history of right hip replacement many years ago and still occasionally gets spasms in right hip presents with hip spasm with severe pain, which was sustained from rest while patient was out at a restaurant today. Patient denies fall or trauma, numbness, tingling or weakness. Patient is able to stand and bear weight. She admits to drinking a bottle of wine with dinner PTA. Patient normally manages pain with heat, ice, and aspirin. Patient is an active smoker. 72 y/o female with PHMx of atrial fibrillation, CHF, CAD with stent placed on metoprolol, losartan, atorvastatin, Plavix, Xarelto, torsemide, potassium, venlafaxine, pantoprazole, and aldactone, who has prior history of right hip replacement many years ago and still occasionally gets spasms in the right hip presents with hip spasm with severe pain, which was sustained from rest while she was out at a restaurant today. Patient denies fall or trauma, numbness, tingling or weakness. Patient is able to stand and bear weight. She admits to drinking a bottle of wine with dinner PTA. Patient normally manages pain with heat, ice, and aspirin. Patient is an active smoker.

## 2022-04-18 ENCOUNTER — APPOINTMENT (OUTPATIENT)
Dept: ORTHOPEDIC SURGERY | Facility: CLINIC | Age: 72
End: 2022-04-18

## 2022-05-09 NOTE — ED ADULT NURSE NOTE - TEMPLATE
1.  Improving  2.  Increase lisinopril to 40 mg daily  3.  Patient to decrease sodium consumption   4.  Heart healthy diet  5.  Follow-up in 3 months  
Cardiac

## 2022-05-20 ENCOUNTER — APPOINTMENT (OUTPATIENT)
Dept: ORTHOPEDIC SURGERY | Facility: CLINIC | Age: 72
End: 2022-05-20
Payer: MEDICARE

## 2022-05-20 VITALS
OXYGEN SATURATION: 91 % | SYSTOLIC BLOOD PRESSURE: 106 MMHG | BODY MASS INDEX: 44.16 KG/M2 | WEIGHT: 240 LBS | HEIGHT: 62 IN | HEART RATE: 85 BPM | DIASTOLIC BLOOD PRESSURE: 74 MMHG | TEMPERATURE: 97 F

## 2022-05-20 DIAGNOSIS — Z47.1 AFTERCARE FOLLOWING JOINT REPLACEMENT SURGERY: ICD-10-CM

## 2022-05-20 DIAGNOSIS — E66.01 MORBID (SEVERE) OBESITY DUE TO EXCESS CALORIES: ICD-10-CM

## 2022-05-20 DIAGNOSIS — Z96.641 AFTERCARE FOLLOWING JOINT REPLACEMENT SURGERY: ICD-10-CM

## 2022-05-20 DIAGNOSIS — M79.7 FIBROMYALGIA: ICD-10-CM

## 2022-05-20 PROCEDURE — 99214 OFFICE O/P EST MOD 30 MIN: CPT

## 2022-05-23 NOTE — HISTORY OF PRESENT ILLNESS
[de-identified] : 5/20/22: 73 y/o female presenting for evaluation of low back and right buttock pain. Also complains of chronic neck and bilateral shoulder pain. Symptoms have been present for years. She reports that the right hip was replaced about two years ago, she believes for arthritis, does not recall where or by whom. Has generally been satisfied with the function of the right SALLY. She reports having fallen and fractured the same hip also about 2 years ago, and undergoing nonsurgical management at that time. States she has been sedentary over the pandemic. Has gained a lot of weight. Uses Advil for pain despite having been previously advised not to (hx of A-fib on Xarelto). Hx of MI with stent placed 20 years ago, follows with cardiologists. HX of CHF. Hx of fibromyalgia x40 years.

## 2022-05-23 NOTE — DISCUSSION/SUMMARY
[de-identified] : 73 y/o with well-functioning right total hip replacement, chronic pain secondary to fibromyalgia, likely cervical and lumbar spondylosis, severe deconditioning and morbid obesity\par - Reassured that there is no apparent mechanical issue at hand with the SALLY\par - Referral for OPT\par - Instructions for home exercise program  \par - Referral for nutritionist for diet program\par - Tylenol as needed; discouraged NSAIDs secondary to Xarelto\par - Follow up with Dr. Yi for the shoulders\par - Follow up with me as needed

## 2022-05-27 RX ORDER — CYCLOBENZAPRINE HYDROCHLORIDE 5 MG/1
5 TABLET, FILM COATED ORAL 3 TIMES DAILY
Qty: 10 | Refills: 0 | Status: ACTIVE | COMMUNITY
Start: 2022-05-27 | End: 1900-01-01

## 2022-07-19 NOTE — ED ADULT NURSE NOTE - PERIPHERAL VASCULAR
Alert-The patient is alert, awake and responds to voice. The patient is oriented to time, place, and person. The triage nurse is able to obtain subjective information. WDL

## 2022-08-29 ENCOUNTER — RX RENEWAL (OUTPATIENT)
Age: 72
End: 2022-08-29

## 2022-08-29 NOTE — ED ADULT NURSE NOTE - SUICIDE SCREENING QUESTION 3
The patient has been examined and the H&P has been reviewed:    I concur with the findings and no changes have occurred since H&P was written.    Surgery risks, benefits and alternative options discussed and understood by patient/family.          There are no hospital problems to display for this patient.     No

## 2022-08-31 ENCOUNTER — RX RENEWAL (OUTPATIENT)
Age: 72
End: 2022-08-31

## 2022-10-27 ENCOUNTER — RX RENEWAL (OUTPATIENT)
Age: 72
End: 2022-10-27

## 2022-10-31 ENCOUNTER — RX RENEWAL (OUTPATIENT)
Age: 72
End: 2022-10-31

## 2022-11-18 NOTE — ED ADULT NURSE NOTE - CAS TRG GENERAL NORM CIRC DET
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 Diet:  common adult    Follow Up : To PCP's office in 3 weeks.      Iris Tsang D.O.      11/18/22 9:23 AM
Capillary refill less/equal to 2 seconds/Strong peripheral pulses

## 2022-12-26 NOTE — ED ADULT TRIAGE NOTE - WEIGHT IN KG
" EMERGENCY DEPARTMENT ENCOUNTER    Room Number:  30/30  Date seen:  12/26/2022  PCP: Morales Alan MD  Historian: Patient and spouse      HPI:  Chief Complaint: Right upper quadrant pain    A complete HPI/ROS/PMH/PSH/SH/FH are unobtainable due to: N/A    Context: Jacey Valdez is a 70 y.o. female who presents to the ED c/o sudden onset of right upper quadrant pain which started earlier today.  Pain does not radiate.  She has had minimal nausea but has not had much in way of an appetite for the past few days.  No vomiting or diarrhea.  Urine has been unremarkable.  She has a chronic cough that is unchanged.    Prior history of renal transplant.      PAST MEDICAL HISTORY  Active Ambulatory Problems     Diagnosis Date Noted   • No Active Ambulatory Problems     Resolved Ambulatory Problems     Diagnosis Date Noted   • No Resolved Ambulatory Problems     Past Medical History:   Diagnosis Date   • Kidney transplanted    • Tachycardia          PAST SURGICAL HISTORY  Past Surgical History:   Procedure Laterality Date   • PARATHYROIDECTOMY      \"All removed but 1/2 of one\"   • TRANSPLANTATION RENAL           FAMILY HISTORY  History reviewed. No pertinent family history.      SOCIAL HISTORY  Social History     Socioeconomic History   • Marital status:    Tobacco Use   • Smoking status: Never   • Smokeless tobacco: Never   Vaping Use   • Vaping Use: Never used   Substance and Sexual Activity   • Alcohol use: Never   • Drug use: Never   • Sexual activity: Defer         ALLERGIES  Patient has no known allergies.        REVIEW OF SYSTEMS  Review of Systems   Constitutional: Positive for appetite change, chills and fever.   Respiratory: Positive for cough (Chronic, unchanged). Negative for shortness of breath.    Cardiovascular: Negative for chest pain.   Gastrointestinal: Positive for abdominal pain. Negative for diarrhea and vomiting.   Genitourinary: Negative for difficulty urinating and dysuria.    "         PHYSICAL EXAM  ED Triage Vitals [12/26/22 1639]   Temp Heart Rate Resp BP SpO2   (!) 101.2 °F (38.4 °C) 100 20 139/85 (!) 89 %      Temp src Heart Rate Source Patient Position BP Location FiO2 (%)   Oral -- -- -- --       Physical Exam      Physical Exam   Constitutional: Pt. is oriented to person, place, and time.  She is well-developed, well-nourished, and in mild distress.    HENT: Normocephalic and atraumatic. Pupils are equal, round, and reactive to light.   Neck: Normal range of motion. Neck supple. No JVD present. No tracheal deviation present.   Cardiovascular: Normal rate, regular rhythm and normal heart sounds.   Pulmonary/Chest: Effort normal and breath sounds normal. No stridor. No respiratory distress. No wheezes, no rales.   Abdominal: Soft.  No distension. There is moderate right upper quadrant tenderness with positive Mchugh sign. There is no rebound and no guarding.   Musculoskeletal: No edema, tenderness or deformity.   Neurological: She is alert and oriented to person, place, and time.  She has no focal neurologic deficits.  Skin: Skin is warm and dry. Pt. is not diaphoretic.  Psychiatric: Mood, affect normal.  She is pleasant and cooperative.  Nursing note and vitals reviewed.            LAB RESULTS  Recent Results (from the past 24 hour(s))   Comprehensive Metabolic Panel    Collection Time: 12/26/22  5:55 PM    Specimen: Blood   Result Value Ref Range    Glucose 137 (H) 65 - 99 mg/dL    BUN 19 8 - 23 mg/dL    Creatinine 0.91 0.57 - 1.00 mg/dL    Sodium 138 136 - 145 mmol/L    Potassium 3.9 3.5 - 5.2 mmol/L    Chloride 107 98 - 107 mmol/L    CO2 23.7 22.0 - 29.0 mmol/L    Calcium 9.3 8.6 - 10.5 mg/dL    Total Protein 6.9 6.0 - 8.5 g/dL    Albumin 2.9 (L) 3.5 - 5.2 g/dL    ALT (SGPT) 29 1 - 33 U/L    AST (SGOT) 37 (H) 1 - 32 U/L    Alkaline Phosphatase 89 39 - 117 U/L    Total Bilirubin 0.7 0.0 - 1.2 mg/dL    Globulin 4.0 gm/dL    A/G Ratio 0.7 g/dL    BUN/Creatinine Ratio 20.9 7.0 - 25.0     Anion Gap 7.3 5.0 - 15.0 mmol/L    eGFR 68.4 >60.0 mL/min/1.73   Lipase    Collection Time: 12/26/22  5:55 PM    Specimen: Blood   Result Value Ref Range    Lipase 36 13 - 60 U/L   CBC Auto Differential    Collection Time: 12/26/22  5:55 PM    Specimen: Blood   Result Value Ref Range    WBC 7.03 3.40 - 10.80 10*3/mm3    RBC 3.99 3.77 - 5.28 10*6/mm3    Hemoglobin 10.6 (L) 12.0 - 15.9 g/dL    Hematocrit 33.4 (L) 34.0 - 46.6 %    MCV 83.7 79.0 - 97.0 fL    MCH 26.6 26.6 - 33.0 pg    MCHC 31.7 31.5 - 35.7 g/dL    RDW 15.1 12.3 - 15.4 %    RDW-SD 45.5 37.0 - 54.0 fl    MPV 10.4 6.0 - 12.0 fL    Platelets 145 140 - 450 10*3/mm3    Neutrophil % 83.1 (H) 42.7 - 76.0 %    Lymphocyte % 6.7 (L) 19.6 - 45.3 %    Monocyte % 9.8 5.0 - 12.0 %    Eosinophil % 0.0 (L) 0.3 - 6.2 %    Basophil % 0.1 0.0 - 1.5 %    Immature Grans % 0.3 0.0 - 0.5 %    Neutrophils, Absolute 5.84 1.70 - 7.00 10*3/mm3    Lymphocytes, Absolute 0.47 (L) 0.70 - 3.10 10*3/mm3    Monocytes, Absolute 0.69 0.10 - 0.90 10*3/mm3    Eosinophils, Absolute 0.00 0.00 - 0.40 10*3/mm3    Basophils, Absolute 0.01 0.00 - 0.20 10*3/mm3    Immature Grans, Absolute 0.02 0.00 - 0.05 10*3/mm3    nRBC 0.0 0.0 - 0.2 /100 WBC   Lactic Acid, Plasma    Collection Time: 12/26/22  5:55 PM    Specimen: Blood   Result Value Ref Range    Lactate 1.0 0.5 - 2.0 mmol/L   Procalcitonin    Collection Time: 12/26/22  5:55 PM    Specimen: Blood   Result Value Ref Range    Procalcitonin 0.26 (H) 0.00 - 0.25 ng/mL       Ordered the above labs and reviewed the results.        RADIOLOGY  CT Abdomen Pelvis With Contrast    Result Date: 12/26/2022  CT ABDOMEN PELVIS W CONTRAST-  Radiation dose reduction techniques were utilized, including automated exposure control and exposure modulation based on body size.    Clinical: Right upper quadrant pain, febrile  Correlation with gallbladder ultrasound earlier at 1831 hours.  FINDINGS: There is mild thickening and irregularity of the gallbladder wall  which was not appreciated on the ultrasound examination. Potentially calculus cholecystitis. CBD is dilated, 11 mm in diameter. This smoothly tapers to the ampulla. No stone demonstrated. Possible small diverticulum at this location measuring 13 mm, partially filled with gas.  The liver is grossly enlarged and heterogenous in attenuation with superficial irregularity and a nodular appearance consistent with cirrhosis. Areas of diminished attenuation likely fatty infiltration. Underlying diffuse hepatocellular disease or neoplasm possible. The spleen is enlarged and there is extensive collateral vessel formation throughout the upper abdomen with recannulization of the umbilical vein consistent with portal hypertension. Within the right lobe of the liver there is a 14 mm area of nodularity having the appearance of a cyst. The pancreas is satisfactory in appearance.  Both adrenal glands are normal. The native kidneys are small and there is nephrocalcinosis. There is 2 cm right renal cyst. Right pelvic renal transplant demonstrates a normal pattern of enhancement, no obstructive uropathy mass or calculus. There is wall thickening demonstrated along the right lateral border of the urinary bladder. There is some serosal spiculation at this location. Cystitis not excluded. Tiny gas bubble is noted within, has a been recent catheterization?  There is diverticulosis of the colon. No indication of diverticulitis. Within the midabdomen there is a thick-walled segment of jejunum measuring approximately 12 cm in length. This consistent with enteritis. The stomach is collapsed. Duodenal bulb is typical in appearance. There is some fold thickening and irregularity of the second third and fourth portions of the duodenum suggestive of duodenitis.  No free air or free intraperitoneal fluid. The uterus is small in size, appropriate for age, no ovarian abnormality. 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  Conclusion: 1. The liver is grossly abnormal,  it is enlarged with surface irregularity and heterogenous. The appearance is consistent with cirrhosis with likely fatty infiltration. Underlying hepatitis or hepatocellular carcinoma not excluded. There is splenomegaly and extensive collateral vessel formation consistent with portal hypertension. 2. There is gallbladder wall thickening and irregularity, possible acalculus cholecystitis. CBD is dilated at 11 mm with smooth tapering to the ampulla. No definite stone identified. At this location there appears to be a partially gas-filled diverticulum. 3. Renal transplant is satisfactory in appearance. There is asymmetric wall thickening of the right bladder with serosal irregularity/edema worrisome for cystitis. 4. Long segment of jejunum which demonstrates wall thickening. In addition there is fold irregularity of the duodenum, findings suggest enteritis/duodenitis. 5. Diverticulosis of the colon. No diverticulitis.  MRI/MRCP would be helpful as follow-up.   This report was finalized on 12/26/2022 8:09 PM by Dr. Tong Noriega M.D.      US Gallbladder    Result Date: 12/26/2022  US GALLBLADDER-clinical: Fever with right upper quadrant pain  Renal transplant  COMPARISON: None  FINDINGS: Only a small portion of the pancreatic body could be visualized and is within normal limits however the majority is obscured. No pancreatic ductal dilatation seen. No peripancreatic fluid identified.  There is some coarsening of the overall hepatic echotexture in addition there is some surface irregularity of the liver, the findings are worrisome for cirrhosis. No intrahepatic ductal dilatation.  The native right kidney is small, 4 cm in length. The transplant is 11 cm in length. Transplant cortical echotexture is within normal limits, no obstructive uropathy calculus or mass is demonstrated. No renal RI calculated.  The gallbladder is satisfactory in appearance. No gallstones or gallbladder wall thickening nor pericholecystic fluid.  CBD is dilated at 12 mm. Pancreatic protocol CT would be the best means for further evaluation. No free fluid is demonstrated within the upper abdomen.  CONCLUSION: The gallbladder is satisfactory in appearance, no gallstones seen. CBD diameter however is abnormal at 12 mm. Only small portion of the pancreatic body could be visualized. Pancreatic protocol CT would be the best means for further evaluation if possible.  The right renal transplant is satisfactory in appearance. There is coarsening of the hepatic echotexture with surface irregularity worrisome for cirrhosis. No free fluid is demonstrated within the right upper quadrant.  This report was finalized on 12/26/2022 7:05 PM by Dr. Tong Noriega M.D.      XR Chest 1 View    Result Date: 12/26/2022  SINGLE VIEW OF THE CHEST  HISTORY: Fever  COMPARISON: None available.  FINDINGS: Cardiomegaly is present. There is tortuosity of the aorta. No pneumothorax is seen. There is some blunting the left costophrenic angle. There are multifocal pulmonary opacities which are noted within the right upper lobe, concerning for pneumonia. There is some additional mild consolidation noted at the lung bases bilaterally. This may represent atelectasis and/or scarring, although additional infiltrate is not excluded.      Multifocal pulmonary opacities, concerning for pneumonia.  This report was finalized on 12/26/2022 7:24 PM by Dr. Jackelyn Merritt M.D.        Ordered the above noted radiological studies. Reviewed by me in PACS.        PROCEDURES  Procedures      MEDICATIONS GIVEN IN ER  Medications   sodium chloride 0.9 % flush 10 mL (has no administration in time range)   azithromycin (ZITHROMAX) 500 mg in sodium chloride 0.9 % 250 mL IVPB-VTB (500 mg Intravenous Given 12/26/22 2040)   acetaminophen (TYLENOL) tablet 650 mg (650 mg Oral Given 12/26/22 1804)   lactated ringers bolus 1,000 mL (0 mL Intravenous Stopped 12/26/22 1920)   ondansetron (ZOFRAN) injection 4 mg (4 mg  Intravenous Given 12/26/22 1804)   HYDROmorphone (DILAUDID) injection 0.5 mg (0.5 mg Intravenous Given 12/26/22 1806)   iopamidol (ISOVUE-300) 61 % injection 100 mL (85 mL Intravenous Given 12/26/22 1931)   cefTRIAXone (ROCEPHIN) 1 g in sodium chloride 0.9 % 100 mL IVPB-VTB (0 g Intravenous Stopped 12/26/22 2039)             MEDICAL DECISION MAKING, PROGRESS, and CONSULTS    All labs have been independently reviewed by me.  All radiology studies have been reviewed by me and discussed with radiologist dictating the report.   EKG's independently viewed and interpreted by me.  Discussion below represents my analysis of pertinent findings related to patient's condition, differential diagnosis, treatment plan and final disposition.      Additional sources:  - Discussed/ obtained information from independent historians:  No    - External (non-ED) record review: She has a history of renal transplant, hypertension, CKD stage I and portal hypertension.    - Chronic or social conditions impacting care: Prior renal transplant.      Orders placed during this visit:  Orders Placed This Encounter   Procedures   • Blood Culture - Blood,   • Blood Culture - Blood,   • US Gallbladder   • XR Chest 1 View   • CT Abdomen Pelvis With Contrast   • Comprehensive Metabolic Panel   • Lipase   • Urinalysis With Culture If Indicated - Urine, Clean Catch   • CBC Auto Differential   • Lactic Acid, Plasma   • Procalcitonin   • NPO Diet NPO Type: Strict NPO   • Cardiac Monitoring   • [facility] (on-call MD unless specified) Details   • Insert Peripheral IV   • Inpatient Admission   • CBC & Differential       Additional orders considered but not ordered:  N/A    Differential diagnosis:  Abd Pain DDx includes but is not limited to: Cholecystitis, choledocholithiasis, cholangitis, peritonitis, pancreatitis/pseudocyst, peptic ulcer, bowel obstruction/ileus, constipation, diverticulitis/perforation/abscess, inflammatory bowel disease, renal colic/stone,  urinary tract infection/pyelonephritis, prostatitis, mesenteric ischemia, expanding/leaking AAA, testicular/ovarian torsion.      Independent interpretation of labs, radiology studies, and discussions with consultants:  ED Course as of 12/26/22 2050   Mon Dec 26, 2022   1813 Hemoglobin(!): 10.6 [WC]   1813 Hematocrit(!): 33.4  CBC is otherwise unremarkable [WC]   1827 Lipase: 36 [WC]   1827 AST (SGOT)(!): 37 [WC]   1836 Procalcitonin(!): 0.26 [WC]   1858 Preliminary ultrasound report is negative for cholelithiasis/cholecystitis. [WC]   1933 Chest x-ray was independently visualized by me and discussed with/interpreted by Dr. Merritt (radiology)-it shows multifocal pulmonary opacities concerning for pneumonia. [WC]   2013 CT of the abdomen pelvis was independently visualized by me and discussed with/interpreted by Dr. Noriega (radiology)-there is grossly abnormal liver consistent with cirrhosis/fatty infiltration.  Gallbladder wall is thickened and irregular-?  Acalculous cholecystitis.  Renal transplant appears satisfactory.  There is also possibly duodenitis and enteritis.  For official interpretation, see dictated report [WC]   2014 Case discussed with Dr. Ceja ([facility])-she agrees to admit the patient to telemetry bed. [WC]      ED Course User Index  [WC] Felix Dhillon MD              Appropriate PPE was worn by myself and the patient throughout our entire interaction.    DIAGNOSIS  Final diagnoses:   Multifocal pneumonia   Febrile illness   History of renal transplant         DISPOSITION  Admitted-telemetry            Latest Documented Vital Signs:  As of 20:50 EST  BP- 124/71 HR- 65 Temp- (!) 101.2 °F (38.4 °C) (Oral) O2 sat- 96%        --    Please note that portions of this were completed with a voice recognition program.       Note Disclaimer: At [facility], we believe that sharing information builds trust and better relationships. You are receiving this note because you are receiving care at  [facility] or recently visited. It is possible you will see health information before a provider has talked with you about it. This kind of information can be easy to misunderstand. To help you fully understand what it means for your health, we urge you to discuss this note with your provider.           Felix Dhillon MD  12/26/22 2050     99.8

## 2023-01-24 NOTE — ED PROVIDER NOTE - DATE/TIME 2
Addended by: CARMEN ALARCON on: 1/24/2023 02:40 PM     Modules accepted: Orders    
01-Feb-2017 20:32

## 2023-04-25 NOTE — PATIENT PROFILE ADULT - NSASFALLWHENOCCURRED_GEN_A_NUR
CHOLECYSTECTOMY  2001    COLONOSCOPY  1/2004    Due 2014    CYSTOSCOPY  7/9/10    CYSTOSCOPY  11/18/10    insertion of sparc mesh sling with cystoscopy    HYSTERECTOMY (CERVIX STATUS UNKNOWN)      JOINT REPLACEMENT      KNEE ARTHROSCOPY Right     KNEE ARTHROSCOPY Left 10/13    partial medial menisectomy    KNEE SURGERY      PLANTAR FASCIA SURGERY      bilateral    BRANDI AND BSO (CERVIX REMOVED)  4/10    DUB    TOTAL KNEE ARTHROPLASTY Right 3/29/2019    RIGHT TOTAL KNEE MAKOPLASTY REPLACEMENT WITH ADDUCTOR CANAL BLOCK FOR PAIN CONTROL                 JOE AYALAO  CPT CODE - 81674  performed by Ana Rosa Davis MD at [place] [address] History     Tobacco Use    Smoking status: Never    Smokeless tobacco: Never   Vaping Use    Vaping Use: Never used   Substance Use Topics    Alcohol use: No    Drug use: No         Physical Exam    Left breast - 14 mm mass at 3:00 o'clock, 4.5 cm from the nipple, unchanged, no other masses, no nipple or skin changes  Right breast - no masses, no nipple or skin changes  No cervical or axillary adenopathy. ASSESSMENT   Diagnosis Orders   1. At high risk for breast cancer        2. Family history of breast cancer        3. Visit for screening mammogram        4. Dense breast tissue on mammogram           PLAN    Mammogram was reviewed. No masses or suspicious findings on exam. Healthy lifestyle modifications were reviewed with the patient. I recommend we obtain an MRI to further evaluate her dense breast tissue. Order placed. Continue monthly self breast exam, f/u with me in 1 year with mammogram.        I have spent 20 minutes reviewing previous notes, test results, and face to face with the patient discussing the diagnosis and importance of compliance with the treatment plan as well as documenting on the day of the visit 4/25/2023   An  electronic signature was used to authenticate this note.     --Lori Corona MD on 4/25/2023 at 2:17 PM last six months

## 2023-08-10 NOTE — ED ADULT NURSE NOTE - NURSING MUSC STRENGTH
Hpi Title: Evaluation of Skin Lesions How Severe Are Your Spot(S)?: moderate Have Your Spot(S) Been Treated In The Past?: has not been treated hand grasp, leg strength strong and equal bilaterally

## 2023-08-11 NOTE — ED ADULT NURSE NOTE - CCCP TRG CHIEF CMPLNT
"  Outpatient Physical Therapy  DAILY TREATMENT     [facility] [place] [id number] [facility], [address]  [place] [address]  Phone:  [phone]    Date: 08/14/2023  Patient: Isadora Williamson  YOB: 1959  MRN: [id number]   Time Calculation  Start time: 0115  Stop time: 0215 Time Calculation (min): 60 minutes   Chief Complaint: No chief complaint on file.  Visit #: 8    SUBJECTIVE: pt with cont pain in AM; feels like the pain is more centralized.     Sxs: R sided LBP and B buttock pain; and tingling in the lateral legs down to the shins and to the lateral or anterior feet      Aggs:   -hiking or walking notes she can't walk fast mostly on the R,   -couldn't sleep on the R (better now),   -couldn't lie on her stomach or still struggles in prone press up    OBJECTIVE:   Current objective measures:   Prone hip ext hurts, L/S ext hurts if in prone with hands on table     AROM  Flexion tighter L leg WFL  Extension WFL today  RSB WFL  LSB WFL     None below:  Hip PROm Wfl ea side no issues; L slightly tighter per pt with ER stretch  + piriformis R TTP  Prone press up or prone leg lift brings on LBP        Therapeutic Exercises (CPT 64465):     1. UPOC 9/13/23    2. modified piriformis str, x1'    3. linda str, SKTC with leg no pain; with leg extended LBP    4. nerve tensioner, R/L: side x1' on/off    7. back extension prone I holds, [id number]\"H, 3'bs    8. clams, PTB xFATIGUE    9. bridges, x1'    10. bird dog LE only, 3x30\", not today    11. wall squats/hack squat, 3x15; 20#    12. split squat, 3x8 15#    13. Shuttle, 5B SL 3x15 ea, up resistance next time      Therapeutic Exercise Summary:  2. Modified piriformis stretch x45\"ea    3. Repeated flexion in standing 2x10    5. Nerve tensioner x3'  -glute bridge with PTB x30  -clams xfatigue ea PTB  -bird dog LE only  -ball squats or wall squats light to no weight    Therapeutic Treatments and Modalities:     1. Manual Therapy (CPT 40316), " chest pressure long axis traction GIII, not today    2. E Stim Unattended (CPT 55477), x15' heat and IFC; bell in place for safety if too hot    Time-based treatments/modalities:  Physical Therapy Timed Treatment Charges  Manual therapy minutes (CPT 18759): 10 minutes  Therapeutic exercise minutes (CPT 12103): 30 minutes  ASSESSMENT: pt cont with pain in extension positions through her spine; still with difficulty in clinic. Her sxs slighlty subside with manual PT traction. We attempted mechanical intermittent traction at end of session and check in with patient next visit on sxs after to determine if potential to benefit here.     PLAN/RECOMMENDATIONS:   Plan for treatment: therapy treatment to continue next visit.  Planned interventions for next visit: continue with current treatment.

## 2023-08-16 NOTE — ED ADULT NURSE NOTE - COVID-19  TEST TYPE
MOLECULAR PCR Topical Metronidazole Counseling: Metronidazole is a topical antibiotic medication. You may experience burning, stinging, redness, or allergic reactions.  Please call our office if you develop any problems from using this medication.

## 2024-01-06 NOTE — ED ADULT NURSE NOTE - CADM POA CENTRAL LINE
I called patient regarding ultrasound results.  Indication for imaging based on note from 01/01/2024 was to rule out DVT.  No DVT.  Discussed other findings including Baker's cyst and lower extremity edema.  Patient will follow up with PCP regarding bilateral lower extremity edema.  Patient expresses understanding.    US Lower Extremity Veins Left    Result Date: 1/5/2024  EXAMINATION: US LOWER EXTREMITY VEINS LEFT CLINICAL HISTORY: Cellulitis of left lower limb TECHNIQUE: Duplex and color flow Doppler evaluation and graded compression of the left lower extremity veins was performed. COMPARISON: None FINDINGS: Duplex and color flow Doppler evaluation does not reveal any evidence of acute venous thrombosis in the common femoral, superficial femoral, greater saphenous, popliteal, peroneal, anterior tibial and posterior tibial veins of the left lower extremity.  There is no reflux to suggest valvular incompetence. There is a primarily hypoechoic mildly complex collection within the left popliteal fossa measuring approximately 5.3 x 1.6 x 2.5 cm.  There is left lower extremity subcutaneous edema.     No evidence of deep venous thrombosis in the left lower extremity. Mildly complex left popliteal fossa cyst. Left lower extremity subcutaneous edema. Electronically signed by: Sebastien Pan MD Date:    01/05/2024 Time:    12:56     
No

## 2024-03-14 NOTE — ED ADULT NURSE NOTE - NSFALLRSKASSESASSIST_ED_ALL_ED
I spoke with patient unable to print form out of screenshot. Patient informed to fax to our clinic or drop it off. While I was talking to patient, patient hanged up. Tied calling patient 2x again but did not answer phone.  
no

## 2024-06-28 NOTE — ED PROVIDER NOTE - NS_ ATTENDINGSCRIBEDETAILS _ED_A_ED_FT
Pt remains in bilateral soft wrist restraints. Safety maintained.    Problem: Safety Risk - Non-Violent Restraints  Goal: Patient will remain free from self-harm  Description: INTERVENTIONS:  - Apply the least restrictive restraint to prevent harm  - Notify patient and family of reasons restraints applied  - Assess for any contributing factors to confusion (electrolyte disturbances, delirium, medications)  - Discontinue any unnecessary medical devices as soon as possible  - Assess the patient's physical comfort, circulation, skin condition, hydration, nutrition and elimination needs   - Reorient and redirection as needed  - Assess for the need to continue restraints  Outcome: Not Progressing      I agree with chris's documentation and have made edits as needed.

## 2024-08-12 NOTE — DISCHARGE NOTE NURSING/CASE MANAGEMENT/SOCIAL WORK - NSDCPEXARELTOFU_GEN_ALL_CORE
Pre-operative evaluation for Procedure(s) (LRB):  EGD (ESOPHAGOGASTRODUODENOSCOPY) (N/A)    Tasha Hawley is a 68 y.o. female     ECHO (if on file):  Results for orders placed during the hospital encounter of 05/08/24    Echo    Interpretation Summary    Left Ventricle: The left ventricle is normal in size. Normal wall thickness. There is normal systolic function. Biplane (2D) method of discs ejection fraction is 69%. Grade I diastolic dysfunction.    Right Ventricle: Normal right ventricular cavity size. Systolic function is normal. TAPSE is 3.66 cm.    Normal left atrial size. The left atrium volume index is 28.1 mL/m2.    Normal right atrial size.    The aortic valve is structurally normal. There is normal leaflet mobility.    The mitral valve is structurally normal. There is normal leaflet mobility.    The tricuspid valve is structurally normal. There is normal leaflet mobility.      Patient Active Problem List   Diagnosis    CECI (obstructive sleep apnea)    Iron deficiency anemia    Major depressive disorder, recurrent, mild    Chronic pain syndrome    Cervical myelopathy    SOB (shortness of breath)    Narcotic dependency, continuous    Thoracic aorta atherosclerosis    GERD (gastroesophageal reflux disease)    Neurogenic bladder    Frequent falls    Lymphedema of both lower extremities    Scoliosis deformity of spine    S/P insertion of intrathecal pump    Paresthesia of both lower extremities    Degenerative disc disease, lumbar    Osteoarthritis of spine with radiculopathy, lumbar region    Oropharyngeal dysphagia    Bilateral carotid artery disease    Insomnia due to medical condition    Suprapubic catheter    Esophageal dysphagia    Mixed stress and urge urinary incontinence    Pure hypercholesterolemia    Chronic diastolic heart failure    Constipation due to opioid therapy    Leg pain, bilateral    Physical deconditioning    Tremor    Anxiety    Calcaneal spur of left foot    Charcot ankle,  unspecified laterality    Normocytic anemia    Preoperative respiratory examination    BMI 34.0-34.9,adult    Intractable pain    Pulmonary hypertension due to diastolic systemic ventricular dysfunction    Pulmonary air trapping    Migraine    Hypothyroid    History of staph infection    Chronic, continuous use of opioids    Coronary artery disease involving native coronary artery of native heart with angina pectoris    Fracture, phalanx, foot    Idiopathic hypotension    Displaced fracture of proximal phalanx of right great toe, initial encounter for closed fracture    Chronic venous hypertension (idiopathic) with ulcer and inflammation of bilateral lower extremity    Adrenal insufficiency    Chronic respiratory failure with hypoxia and hypercapnia    Insomnia    Bilateral cellulitis of lower leg    Acute pain of right shoulder    Renal impairment    Hyponatremia    Opioid dependence, uncomplicated       Review of patient's allergies indicates:   Allergen Reactions    (d)-limonene flavor      Other reaction(s): difficult intubation  Other reaction(s): Difficulty breathing    Benadryl [diphenhydramine hcl] Shortness Of Breath    Fentanyl Itching, Nausea And Vomiting and Swelling             Imitrex [sumatriptan succinate] Shortness Of Breath    Oxycodone-acetaminophen Shortness Of Breath    Sulfamethoxazole-trimethoprim Anaphylaxis    Topiramate Shortness Of Breath    Vancomycin Anaphylaxis     Rash    Butorphanol tartrate     Darvocet a500 [propoxyphene n-acetaminophen]      Other reaction(s): Difficulty breathing    Evening primrose (oenothera biennis)     Latex     Pregabalin Other (See Comments)     tremors    Sumatriptan     White petrolatum-zinc     Zinc acetate     Zinc oxide-white petrolatum      Other reaction(s): Difficulty breathing    Latex, natural rubber Itching and Rash    Phenytoin Rash and Other (See Comments)     Trouble breathing       No current facility-administered medications on file prior to  encounter.     Current Outpatient Medications on File Prior to Encounter   Medication Sig Dispense Refill    amitriptyline (ELAVIL) 25 MG tablet Take 1 tablet (25 mg total) by mouth every evening. 90 tablet 0    atorvastatin (LIPITOR) 80 MG tablet TAKE ONE TABLET BY MOUTH ONCE DAILY 90 tablet 3    cyanocobalamin, vitamin B-12, 5,000 mcg Subl Place 1 tablet under the tongue once daily.      darifenacin (ENABLEX) 7.5 MG Tb24 Take 1 tablet (7.5 mg total) by mouth once daily. 30 tablet 11    DULoxetine (CYMBALTA) 60 MG capsule Take 1 capsule (60 mg total) by mouth 2 (two) times daily. 180 capsule 0    furosemide (LASIX) 40 MG tablet Take 1 tablet (40 mg total) by mouth once daily. 90 tablet 1    hydrocortisone (CORTEF) 10 MG Tab Take 1 tablet (10 mg total) by mouth once daily. 30 tablet 5    levothyroxine (SYNTHROID) 150 MCG tablet Take 1 tablet (150 mcg total) by mouth before breakfast. 30 tablet 11    pantoprazole (PROTONIX) 40 MG tablet Take 1 tablet (40 mg total) by mouth once daily. 90 tablet 0    QUEtiapine (SEROQUEL) 50 MG tablet Take 1 tablet (50 mg total) by mouth every evening. 60 tablet 0    tiotropium bromide (SPIRIVA RESPIMAT) 2.5 mcg/actuation inhaler Inhale 2 puffs into the lungs once daily. Controller. Please restart taking. 4 g 1    traMADoL (ULTRAM) 50 mg tablet Take 50 mg by mouth every 12 (twelve) hours as needed for Pain.      albuterol (PROVENTIL/VENTOLIN HFA) 90 mcg/actuation inhaler Inhale 2 puffs into the lungs every 6 (six) hours as needed for Shortness of Breath or Wheezing. Rescue 8.5 g 5    calcium-vitamin D3 (OS-JACKIE 500 + D3) 500 mg-5 mcg (200 unit) per tablet Take 2 tablets by mouth 2 (two) times daily. 120 tablet 11    docusate sodium (COLACE) 100 MG capsule Take 200 mg by mouth every evening.      hydrocortisone (CORTEF) 5 MG Tab Take 1 tablet (5 mg total) by mouth before dinner. 30 tablet 5    intrathecal pain pump compound Hydromorphone (7.5 mg/mL) infusion at 8.59 mg/day (0.3578  mg/hr) out of a total reservoir volume of 40 mL  Pump filled monthly      menthol (BIOFREEZE, MENTHOL,) 10 % Crea Apply topically as needed.      naproxen (NAPROSYN) 500 MG tablet Take 1 tablet (500 mg total) by mouth 2 (two) times daily with meals. 30 tablet 0    neomycin-polymyxin-hydrocortisone (CORTISPORIN) 3.5-10,000-1 mg/mL-unit/mL-% otic suspension Place 3 drops into both ears 4 (four) times daily. 10 mL 0    nitroGLYCERIN (NITROSTAT) 0.4 MG SL tablet Place 0.4 mg under the tongue every 5 (five) minutes as needed for Chest pain.      senna (SENNA LAX) 8.6 mg tablet Take 2 tablets by mouth 2 (two) times daily.      tobramycin-dexAMETHasone 0.3-0.1% (TOBRADEX) 0.3-0.1 % DrpS Place 1 drop into both eyes every 6 (six) hours while awake. 5 mL 0       Past Surgical History:   Procedure Laterality Date    ADENOIDECTOMY      BACK SURGERY      x 12    CARDIAC CATHETERIZATION  2016    subtotalled LAD with right to left collaterals    CATARACT EXTRACTION W/  INTRAOCULAR LENS IMPLANT Left     Dr Colemna     CYSTOSCOPIC LITHOLAPAXY N/A 6/27/2019    Procedure: CYSTOLITHOLAPAXY;  Surgeon: Shireen Mayo MD;  Location: [facility] OR [address] [place] [address];  Service: Urology;  Laterality: N/A;    CYSTOSCOPIC LITHOLAPAXY N/A 9/3/2019    Procedure: CYSTOLITHOLAPAXY;  Surgeon: Shireen Mayo MD;  [facility];  Service: Urology;  Laterality: N/A;    CYSTOSCOPY N/A 7/13/2021    Procedure: CYSTOSCOPY;  Surgeon: Shireen Mayo MD;  [facility];  Service: Urology;  Laterality: N/A;    CYSTOSCOPY  11/16/2021    Procedure: CYSTOSCOPY;  Surgeon: Shireen Mayo MD;  Location: [facility] OR [address] [place] [address];  Service: Urology;;    CYSTOSCOPY  7/19/2022    Procedure: CYSTOSCOPY;  Surgeon: Shireen Mayo MD;  Location: [facility] OR [address] [place] [address];  Service: Urology;;    CYSTOSCOPY WITH INJECTION OF PERIURETHRAL BULKING AGENT  7/19/2022    Procedure: CYSTOSCOPY, WITH PERIURETHRAL BULKING AGENT INJECTION-MACROPLASTIQUE;  Surgeon: Shireen Mayo,  MD;  [facility];  Service: Urology;;    CYSTOSCOPY WITH INJECTION OF PERIURETHRAL BULKING AGENT N/A 3/28/2023    Procedure: CYSTOSCOPY, WITH PERIURETHRAL BULKING AGENT INJECTION;  Surgeon: Shireen Mayo MD;  [facility];  Service: Urology;  Laterality: N/A;  [place]    CYSTOSCOPY WITH INJECTION OF PERIURETHRAL BULKING AGENT N/A 11/14/2023    Procedure: CYSTOSCOPY, WITH PERIURETHRAL BULKING AGENT INJECTION;  Surgeon: Shireen Mayo MD;  [facility];  Service: Urology;  Laterality: N/A;    CYSTOSCOPY,WITH BOTULINUM TOXIN INJECTION N/A 12/13/2022    Procedure: CYSTOSCOPY,WITH BOTULINUM TOXIN INJECTION;  Surgeon: Shireen Mayo MD;  [facility];  Service: Urology;  Laterality: N/A;  300 U    CYSTOSCOPY,WITH BOTULINUM TOXIN INJECTION N/A 3/28/2023    Procedure: CYSTOSCOPY,WITH BOTULINUM TOXIN INJECTION;  Surgeon: Shireen Mayo MD;  [facility];  Service: Urology;  Laterality: N/A;  45 MIN.    300 UNITS    ESOPHAGOGASTRODUODENOSCOPY N/A 5/23/2018    Procedure: ESOPHAGOGASTRODUODENOSCOPY (EGD);  Surgeon: Prince Vance MD;  Location: [facility] (4TH FLR);  Service: Endoscopy;  Laterality: N/A;  r/s 'd per Dr. Vance due to family emergency- ER    ESOPHAGOGASTRODUODENOSCOPY N/A 6/23/2023    Procedure: EGD (ESOPHAGOGASTRODUODENOSCOPY);  Surgeon: Juaquin Barry MD;  Location: [facility] (2ND FLR);  Service: Endoscopy;  Laterality: N/A;  Refferal: PRINCE VANCE  tele [facility] 5/18/23  dx: history of a Nissen fundoplication  Additional Scheduling Information:  On home oxygen 2nd floor for airway protection also with a pain pump elevated BMI close to 40   Prep Gastroparesis   ins    HYSTERECTOMY  1975    endometriosis    INJECTION OF BOTULINUM TOXIN TYPE A  7/13/2021    Procedure: INJECTION, BOTULINUM TOXIN, 200units;  Surgeon: Shireen Mayo MD;  Location: [facility] OR [address] [place] [address];  Service: Urology;;    INJECTION OF BOTULINUM TOXIN  TYPE A  11/16/2021    Procedure: INJECTION, BOTULINUM TOXIN, 200units;  Surgeon: Shireen Mayo MD;  [facility];  Service: Urology;;    INJECTION OF BOTULINUM TOXIN TYPE A  7/19/2022    Procedure: INJECTION, BOTULINUM TOXIN, 300 units ;  Surgeon: Shireen Mayo MD;  [facility];  Service: Urology;;    INJECTION OF BOTULINUM TOXIN TYPE A N/A 11/14/2023    Procedure: INJECTION, BOTULINUM TOXIN, TYPE A;  Surgeon: Shireen Mayo MD;  [facility];  Service: Urology;  Laterality: N/A;    INSERTION, SUPRAPUBIC CATHETER N/A 12/13/2022    Procedure: INSERTION, SUPRAPUBIC CATHETER;  Surgeon: Shireen Mayo MD;  [facility];  Service: Urology;  Laterality: N/A;  exchange    INSERTION, SUPRAPUBIC CATHETER N/A 11/14/2023    Procedure: INSERTION, SUPRAPUBIC CATHETER;  Surgeon: Shireen Mayo MD;  Location: [facility] OR [address] [place] [address];  Service: Urology;  Laterality: N/A;  EXCHANGE of s/p tube    pain pump placement      SQ Dilaudid Pump managed by Dr. Hillman, Pain Management    REMOVAL OF BONE SPUR OF FOOT Bilateral 9/16/2022    Procedure: EXCISION ARTHRITIC BONE, BILATERAL FOOT;  Surgeon: Adam Mcguire [facility];  Location: [facility];  Service: Podiatry;  Laterality: Bilateral;    REPLACEMENT OF BACLOFEN PUMP N/A 8/2/2023    Procedure: REPLACEMENT, BACLOFEN PUMP;  Surgeon: Smitha Condon MD;  Location: [facility] OR 2ND FLR;  Service: Neurosurgery;  Laterality: N/A;  Anes: Gen  Blood: Type & Screen  Pos: Left Lat  Intrathecal Pump  Bed: Reg Reversed  Rad: C-arm  Pump: 40 cc, "[employer]s    REPLACEMENT OF CATHETER N/A 10/31/2019    Procedure: REPLACEMENT, CATHETER-SUPRAPUBIC;  Surgeon: Shireen Mayo MD;  Location: [facility] OR [address] [place] [address];  Service: Urology;  Laterality: N/A;    SPINAL CORD STIMULATOR REMOVAL      before [place]    SPINE SURGERY  5-13-13    CERVICAL FUSION    TONSILLECTOMY         Social History     Socioeconomic History    Marital status:    Tobacco Use  "   Smoking status: Never    Smokeless tobacco: Never   Substance and Sexual Activity    Alcohol use: Not Currently    Drug use: Yes     Types: Marijuana     Comment: "medical marijuana gummies"     Social Determinants of Health     Financial Resource Strain: Low Risk  (2024)    Overall Financial Resource Strain (CARDIA)     Difficulty of Paying Living Expenses: Not hard at all   Food Insecurity: No Food Insecurity (2024)    Hunger Vital Sign     Worried About Running Out of Food in the Last Year: Never true     Ran Out of Food in the Last Year: Never true   Transportation Needs: Unmet Transportation Needs (2024)    TRANSPORTATION NEEDS     Transportation : Yes, it has kept me from medical appointments or from getting my medications.   Physical Activity: Inactive (2024)    Exercise Vital Sign     Days of Exercise per Week: 0 days     Minutes of Exercise per Session: 0 min   Stress: No Stress Concern Present (2024)    [employer] [place] [employer] - Occupational Stress Questionnaire     Feeling of Stress : Not at all   Recent Concern: Stress - Stress Concern Present (3/19/2024)    [employer] [place] [employer] - Occupational Stress Questionnaire     Feeling of Stress : To some extent   Housing Stability: Low Risk  (2024)    Housing Stability Vital Sign     Unable to Pay for Housing in the Last Year: No     Homeless in the Last Year: No         CBC: No results for input(s): "WBC", "RBC", "HGB", "HCT", "PLT", "MCV", "MCH", "MCHC" in the last 72 hours.    CMP: No results for input(s): "NA", "K", "CL", "CO2", "BUN", "CREATININE", "GLU", "MG", "PHOS", "CALCIUM", "ALBUMIN", "PROT", "ALKPHOS", "ALT", "AST", "BILITOT" in the last 72 hours.    INR  No results for input(s): "PT", "INR", "PROTIME", "APTT" in the last 72 hours.        Diagnostic Studies:      [facility] Echo:  No results found. However, due to the size of the patient record, not all encounters were searched. " Please check Results Review for a complete set of results.        Pre-op Assessment          Review of Systems  Anesthesia Hx:  No problems with previous Anesthesia   History of prior surgery of interest to airway management or planning:          Denies Family Hx of Anesthesia complications.    Denies Personal Hx of Anesthesia complications.                    Cardiovascular:            Denies Angina.       Denies JONES.                            Pulmonary:      Shortness of breath  Sleep Apnea                Hepatic/GI:     GERD (no symptoms currently)                 Physical Exam  General: Well nourished, Cooperative, Alert and Oriented    Airway:  Mouth Opening: Normal  TM Distance: Normal  Tongue: Normal  Neck ROM: Normal ROM    Dental:  Edentulous, Dentures        Anesthesia Plan  Type of Anesthesia, risks & benefits discussed:    Anesthesia Type: Gen Natural Airway  Intra-op Monitoring Plan: Standard ASA Monitors  Post Op Pain Control Plan: multimodal analgesia and IV/PO Opioids PRN  Induction:  IV  Informed Consent: Informed consent signed with the Patient and all parties understand the risks and agree with anesthesia plan.  All questions answered.   ASA Score: 3  Day of Surgery Review of History & Physical: H&P Update referred to the surgeon/provider.    Ready For Surgery From Anesthesia Perspective.     .       Go for blood tests as directed. Your doctor will do lab tests at regular visits to monitor the effects of this medicine. Please follow up with your doctor and keep your health care provider appointments.

## 2024-08-23 NOTE — PHYSICAL THERAPY INITIAL EVALUATION ADULT - GAIT DEVIATIONS NOTED, PT EVAL
No assistance needed
fairly steady, no loss of balance, limited secondary to patient reporting that she felt "faint," vital signs were stable

## 2024-11-27 NOTE — ED ADULT NURSE NOTE - CHIEF COMPLAINT QUOTE
pt walk in with right arm/shoulder pain; fell into pot hole and stuck arm out to stop fall; unable to move shoulder up and down English

## 2025-01-07 NOTE — ED PROVIDER NOTE - CPE EDP EYES NORM
[facility] - Surgery  Discharge Note  Short Stay    Procedure(s) (LRB):  Left carpal tunnel release (Left)      OUTCOME: Patient tolerated treatment/procedure well without complication and is now ready for discharge.    DISPOSITION: Home or Self Care    FINAL DIAGNOSIS:  Carpal tunnel syndrome on left    FOLLOWUP: In clinic    DISCHARGE INSTRUCTIONS:    Discharge Procedure Orders   Diet general     Activity as tolerated     Keep surgical extremity elevated     Lifting restrictions   Order Comments: Please limit lifting with the left arm and hand to 2-3 lb     Leave dressing on - Keep it clean, dry, and intact until clinic visit     Call MD for:  temperature >100.4     Call MD for:  persistent nausea and vomiting     Call MD for:  severe uncontrolled pain     Call MD for:  difficulty breathing, headache or visual disturbances     Call MD for:  redness, tenderness, or signs of infection (pain, swelling, redness, odor or green/yellow discharge around incision site)     Call MD for:  hives     Call MD for:  persistent dizziness or light-headedness     Call MD for:  extreme fatigue        TIME SPENT ON DISCHARGE: 15 minutes  
normal...

## 2025-04-30 NOTE — ED ADULT NURSE NOTE - CAS EDN DISCHARGE ASSESSMENT
Drainage Description Serosanguinous 04/30/25 1106   Odor None 04/30/25 1106   Carmen-wound Assessment Intact;Maceration;Dry/flaky 04/30/25 1106   Margins Attached edges 04/30/25 1106   Wound Thickness Description not for Pressure Injury Full thickness 04/30/25 1106   Number of days: 364          Supplies Requested :        *DISPENSE AS WRITTEN*    WOUND #: 1   PRIMARY DRESSING:  Other:   Excel Daily SAP 4\" x 4\" - Dispense as written      Cover and Secure with: None     FREQUENCY OF DRESSING CHANGES:  Daily       ADDITIONAL ITEMS:  [] Gloves Small  [x] Gloves Medium [] Gloves Large [] Gloves XLarge  [] Tape 1\" [x] Tape 2\" [] Tape 3\"  [] Medipore Tape  [x] Saline  [x] Skin Prep   [] Adhesive Remover   [] Cotton Tip Applicators   [] Other:    Patient Wound(s) Debrided: [x] Yes if yes please add date 4/30/25   [] No    Debridement Type: Mechanical     Patient currently being seen by Home Health: [] Yes   [x] No    Duration for needed supplies:  []15  [x]30  []60  []90 Days        Electronically signed by Shweta Benjamin RN on 4/30/2025 at 12:26 PM     Provider Information:      PROVIDER'S NAME: Dm Garcia MD    NPI: [id number]             Alert and oriented to person, place and time
